# Patient Record
Sex: MALE | Race: WHITE | NOT HISPANIC OR LATINO | URBAN - METROPOLITAN AREA
[De-identification: names, ages, dates, MRNs, and addresses within clinical notes are randomized per-mention and may not be internally consistent; named-entity substitution may affect disease eponyms.]

---

## 2020-07-02 ENCOUNTER — EMERGENCY (EMERGENCY)
Facility: HOSPITAL | Age: 80
LOS: 1 days | Discharge: HOME | End: 2020-07-02
Attending: EMERGENCY MEDICINE | Admitting: EMERGENCY MEDICINE
Payer: MEDICARE

## 2020-07-02 VITALS
DIASTOLIC BLOOD PRESSURE: 114 MMHG | RESPIRATION RATE: 16 BRPM | TEMPERATURE: 98 F | WEIGHT: 134.92 LBS | HEART RATE: 82 BPM | HEIGHT: 72 IN | OXYGEN SATURATION: 98 % | SYSTOLIC BLOOD PRESSURE: 166 MMHG

## 2020-07-02 VITALS
HEART RATE: 78 BPM | TEMPERATURE: 98 F | SYSTOLIC BLOOD PRESSURE: 177 MMHG | DIASTOLIC BLOOD PRESSURE: 72 MMHG | OXYGEN SATURATION: 100 % | RESPIRATION RATE: 20 BRPM

## 2020-07-02 LAB
ALBUMIN SERPL ELPH-MCNC: 3.8 G/DL — SIGNIFICANT CHANGE UP (ref 3.3–5)
ALP SERPL-CCNC: 78 U/L — SIGNIFICANT CHANGE UP (ref 40–120)
ALT FLD-CCNC: 24 U/L — SIGNIFICANT CHANGE UP (ref 12–78)
ANION GAP SERPL CALC-SCNC: 3 MMOL/L — LOW (ref 5–17)
APPEARANCE UR: ABNORMAL
APTT BLD: 33.5 SEC — SIGNIFICANT CHANGE UP (ref 27.5–35.5)
AST SERPL-CCNC: 29 U/L — SIGNIFICANT CHANGE UP (ref 15–37)
BACTERIA # UR AUTO: ABNORMAL
BILIRUB SERPL-MCNC: 0.8 MG/DL — SIGNIFICANT CHANGE UP (ref 0.2–1.2)
BILIRUB UR-MCNC: NEGATIVE — SIGNIFICANT CHANGE UP
BUN SERPL-MCNC: 22 MG/DL — SIGNIFICANT CHANGE UP (ref 7–23)
CALCIUM SERPL-MCNC: 9 MG/DL — SIGNIFICANT CHANGE UP (ref 8.5–10.1)
CHLORIDE SERPL-SCNC: 106 MMOL/L — SIGNIFICANT CHANGE UP (ref 96–108)
CO2 SERPL-SCNC: 30 MMOL/L — SIGNIFICANT CHANGE UP (ref 22–31)
COLOR SPEC: YELLOW — SIGNIFICANT CHANGE UP
CREAT SERPL-MCNC: 1.3 MG/DL — SIGNIFICANT CHANGE UP (ref 0.5–1.3)
DIFF PNL FLD: ABNORMAL
EPI CELLS # UR: SIGNIFICANT CHANGE UP
GLUCOSE SERPL-MCNC: 102 MG/DL — HIGH (ref 70–99)
GLUCOSE UR QL: NEGATIVE — SIGNIFICANT CHANGE UP
HCT VFR BLD CALC: 41 % — SIGNIFICANT CHANGE UP (ref 39–50)
HGB BLD-MCNC: 13.9 G/DL — SIGNIFICANT CHANGE UP (ref 13–17)
INR BLD: 1.12 RATIO — SIGNIFICANT CHANGE UP (ref 0.88–1.16)
KETONES UR-MCNC: NEGATIVE — SIGNIFICANT CHANGE UP
LEUKOCYTE ESTERASE UR-ACNC: ABNORMAL
MCHC RBC-ENTMCNC: 32 PG — SIGNIFICANT CHANGE UP (ref 27–34)
MCHC RBC-ENTMCNC: 33.9 GM/DL — SIGNIFICANT CHANGE UP (ref 32–36)
MCV RBC AUTO: 94.3 FL — SIGNIFICANT CHANGE UP (ref 80–100)
NITRITE UR-MCNC: NEGATIVE — SIGNIFICANT CHANGE UP
NRBC # BLD: 0 /100 WBCS — SIGNIFICANT CHANGE UP (ref 0–0)
PH UR: 6 — SIGNIFICANT CHANGE UP (ref 5–8)
PLATELET # BLD AUTO: 213 K/UL — SIGNIFICANT CHANGE UP (ref 150–400)
POTASSIUM SERPL-MCNC: 4.5 MMOL/L — SIGNIFICANT CHANGE UP (ref 3.5–5.3)
POTASSIUM SERPL-SCNC: 4.5 MMOL/L — SIGNIFICANT CHANGE UP (ref 3.5–5.3)
PROT SERPL-MCNC: 7.7 G/DL — SIGNIFICANT CHANGE UP (ref 6–8.3)
PROT UR-MCNC: 30 MG/DL
PROTHROM AB SERPL-ACNC: 13 SEC — SIGNIFICANT CHANGE UP (ref 10.6–13.6)
RBC # BLD: 4.35 M/UL — SIGNIFICANT CHANGE UP (ref 4.2–5.8)
RBC # FLD: 12.9 % — SIGNIFICANT CHANGE UP (ref 10.3–14.5)
RBC CASTS # UR COMP ASSIST: ABNORMAL /HPF (ref 0–4)
SODIUM SERPL-SCNC: 139 MMOL/L — SIGNIFICANT CHANGE UP (ref 135–145)
SP GR SPEC: 1.01 — SIGNIFICANT CHANGE UP (ref 1.01–1.02)
UROBILINOGEN FLD QL: NEGATIVE — SIGNIFICANT CHANGE UP
WBC # BLD: 7.97 K/UL — SIGNIFICANT CHANGE UP (ref 3.8–10.5)
WBC # FLD AUTO: 7.97 K/UL — SIGNIFICANT CHANGE UP (ref 3.8–10.5)
WBC UR QL: SIGNIFICANT CHANGE UP

## 2020-07-02 PROCEDURE — 99283 EMERGENCY DEPT VISIT LOW MDM: CPT

## 2020-07-02 PROCEDURE — 85610 PROTHROMBIN TIME: CPT

## 2020-07-02 PROCEDURE — 85027 COMPLETE CBC AUTOMATED: CPT

## 2020-07-02 PROCEDURE — 85730 THROMBOPLASTIN TIME PARTIAL: CPT

## 2020-07-02 PROCEDURE — 87086 URINE CULTURE/COLONY COUNT: CPT

## 2020-07-02 PROCEDURE — 80053 COMPREHEN METABOLIC PANEL: CPT

## 2020-07-02 PROCEDURE — 81001 URINALYSIS AUTO W/SCOPE: CPT

## 2020-07-02 PROCEDURE — 51702 INSERT TEMP BLADDER CATH: CPT

## 2020-07-02 PROCEDURE — 36415 COLL VENOUS BLD VENIPUNCTURE: CPT

## 2020-07-02 RX ORDER — CEFUROXIME AXETIL 250 MG
1 TABLET ORAL
Qty: 14 | Refills: 0
Start: 2020-07-02 | End: 2020-07-08

## 2020-07-02 RX ORDER — SODIUM CHLORIDE 9 MG/ML
1000 INJECTION INTRAMUSCULAR; INTRAVENOUS; SUBCUTANEOUS ONCE
Refills: 0 | Status: COMPLETED | OUTPATIENT
Start: 2020-07-02 | End: 2020-07-02

## 2020-07-02 RX ADMIN — SODIUM CHLORIDE 1000 MILLILITER(S): 9 INJECTION INTRAMUSCULAR; INTRAVENOUS; SUBCUTANEOUS at 09:25

## 2020-07-02 NOTE — ED PROVIDER NOTE - CARE PROVIDER_API CALL
Jerome Kay  INTERNAL MEDICINE  [address] [address]  Phone: [phone]  Fax: [phone]  Follow Up Time:     Nicolas Hayes)  Urology  [address] [facility] [address]  [place] [address]  Phone: [phone]  Fax: [phone]  Follow Up Time:

## 2020-07-02 NOTE — ED PROVIDER NOTE - NSFOLLOWUPINSTRUCTIONS_ED_ALL_ED_FT
1) Follow-up with your Primary Medical Doctor. Call today / next business day for prompt follow-up.  2) Return to Emergency room for any worsening or persistent pain, weakness, fever, dizziness, passing out, difficulty breathing or any other concerning symptoms.  3) See attached instruction sheets for additional information, including information regarding signs and symptoms to look out for, reasons to seek immediate care and other important instructions.  4) Follow-up with your Urologist - call today to arrange prompt follow-up this week  5) Ceftin twice daily for 7 days  6) Wear Dexter Catheter until seen by urology 1) Follow-up with your Primary Medical Doctor. Call today / next business day for prompt follow-up.  2) Return to Emergency room for any worsening or persistent pain, weakness, fever, dizziness, passing out, difficulty breathing or any other concerning symptoms.  3) See attached instruction sheets for additional information, including information regarding signs and symptoms to look out for, reasons to seek immediate care and other important instructions.  4) Follow-up with your Urologist - call today to arrange prompt follow-up this week  5) Ceftin twice daily for 7 days  6) Wear Dexter Catheter until seen by urology  7) Have your medical doctor follow your blood pressure as it was elevated in ed today

## 2020-07-02 NOTE — ED PROVIDER NOTE - PATIENT PORTAL LINK FT
You can access the FollowMyHealth Patient Portal offered by [facility] by registering at the following website: [email or web address]. By joining [employer]’s FollowMyHealth portal, you will also be able to view your health information using other applications (apps) compatible with our system.

## 2020-07-02 NOTE — ED PROVIDER NOTE - CARE PROVIDERS DIRECT ADDRESSES
[email or web address]

## 2020-07-02 NOTE — ED ADULT NURSE NOTE - OBJECTIVE STATEMENT
Pt received in bed alert and oriented and resting in bed with the c/o inability to urinate sin Pt received in bed alert and oriented and resting in bed with the c/o inability to urinate since 10pm last.  As per Mds orders thorpe cath placed and it drained 1000ml tea color urine.  Pt tolerated well. Also IV micki placed blood specimen obtained and sent to the lab. Nursing care ongoing and safety maintained

## 2020-07-02 NOTE — ED ADULT NURSE NOTE - NSIMPLEMENTINTERV_GEN_ALL_ED
Implemented All Universal Safety Interventions:  [place] to call system. Call bell, personal items and telephone within reach. Instruct patient to call for assistance. Room bathroom lighting operational. Non-slip footwear when patient is off stretcher. Physically safe environment: no spills, clutter or unnecessary equipment. Stretcher in lowest position, wheels locked, appropriate side rails in place.

## 2020-07-02 NOTE — ED PROVIDER NOTE - MDM ORDERS SUBMITTED SELECTION
No distress    Vital Signs Last 24 Hrs  T(C): 36.7 (12-05-19 @ 07:21), Max: 37.1 (12-04-19 @ 20:31)  T(F): 98.1 (12-05-19 @ 07:21), Max: 98.8 (12-04-19 @ 20:31)  HR: 77 (12-05-19 @ 07:21) (77 - 80)  BP: 138/89 (12-05-19 @ 07:21) (116/76 - 138/89)  RR: 14 (12-05-19 @ 07:21) (14 - 14)  SpO2: 96% (12-05-19 @ 07:21) (96% - 96%)    Card S1S2  Lungs b/l air entry  Abd soft, NT, ND  Extr no edema                                              17.3   7.66  )-----------( 255      ( 04 Dec 2019 09:28 )             50.3     04 Dec 2019 09:28    137    |  101    |  24     ----------------------------<  214    4.3     |  26     |  1.43     Ca    9.1        04 Dec 2019 09:28    TPro  8.1    /  Alb  3.1    /  TBili  0.8    /  DBili  x      /  AST  19     /  ALT  34     /  AlkPhos  88     04 Dec 2019 09:28    LIVER FUNCTIONS - ( 04 Dec 2019 09:28 )  Alb: 3.1 g/dL / Pro: 8.1 g/dL / ALK PHOS: 88 U/L / ALT: 34 U/L / AST: 19 U/L / GGT: x           amLODIPine   Tablet 10 milliGRAM(s) Oral at bedtime  aspirin  chewable 81 milliGRAM(s) Oral daily  atorvastatin 80 milliGRAM(s) Oral at bedtime  dextrose 40% Gel 15 Gram(s) Oral once PRN  dextrose 5%. 1000 milliLiter(s) IV Continuous <Continuous>  dextrose 50% Injectable 12.5 Gram(s) IV Push once  dextrose 50% Injectable 25 Gram(s) IV Push once  dextrose 50% Injectable 25 Gram(s) IV Push once  enoxaparin Injectable 40 milliGRAM(s) SubCutaneous daily  glucagon  Injectable 1 milliGRAM(s) IntraMuscular once PRN  insulin glargine Injectable (LANTUS) 26 Unit(s) SubCutaneous at bedtime  insulin lispro (HumaLOG) corrective regimen sliding scale   SubCutaneous three times a day before meals  insulin lispro (HumaLOG) corrective regimen sliding scale   SubCutaneous at bedtime  ondansetron    Tablet 4 milliGRAM(s) Oral two times a day PRN  pantoprazole    Tablet 40 milliGRAM(s) Oral before breakfast    A/P:    Pontine CVA, dizziness resolving  HTN  BP controlled on Norvasc 10mg QHS   Increased Cr noted  D/w pt to increase fluid intake  BMP in am Labs

## 2020-07-02 NOTE — ED ADULT NURSE NOTE - PSH
BPH (Benign Prostatic Hyperplasia)  s/p biopsy 2010 - benign  Nephrolithiasis  s/p litho 2004  S/P herniorrhaphy  with mesh  S/P tonsillectomy

## 2020-07-03 LAB
CULTURE RESULTS: NO GROWTH — SIGNIFICANT CHANGE UP
SPECIMEN SOURCE: SIGNIFICANT CHANGE UP

## 2020-07-09 ENCOUNTER — EMERGENCY (EMERGENCY)
Facility: HOSPITAL | Age: 80
LOS: 1 days | Discharge: HOME | End: 2020-07-09
Attending: EMERGENCY MEDICINE | Admitting: EMERGENCY MEDICINE
Payer: MEDICARE

## 2020-07-09 ENCOUNTER — TRANSCRIPTION ENCOUNTER (OUTPATIENT)
Age: 80
End: 2020-07-09

## 2020-07-09 VITALS
RESPIRATION RATE: 16 BRPM | TEMPERATURE: 98 F | HEART RATE: 62 BPM | DIASTOLIC BLOOD PRESSURE: 85 MMHG | OXYGEN SATURATION: 99 % | SYSTOLIC BLOOD PRESSURE: 144 MMHG

## 2020-07-09 VITALS
HEART RATE: 79 BPM | DIASTOLIC BLOOD PRESSURE: 100 MMHG | SYSTOLIC BLOOD PRESSURE: 165 MMHG | HEIGHT: 72 IN | OXYGEN SATURATION: 99 % | WEIGHT: 134.92 LBS | RESPIRATION RATE: 16 BRPM | TEMPERATURE: 98 F

## 2020-07-09 PROCEDURE — 51702 INSERT TEMP BLADDER CATH: CPT

## 2020-07-09 PROCEDURE — 99283 EMERGENCY DEPT VISIT LOW MDM: CPT | Mod: 25

## 2020-07-09 PROCEDURE — 99285 EMERGENCY DEPT VISIT HI MDM: CPT

## 2020-07-09 RX ORDER — TAMSULOSIN HYDROCHLORIDE 0.4 MG/1
2 CAPSULE ORAL
Qty: 0 | Refills: 0 | DISCHARGE

## 2020-07-09 RX ORDER — TAMSULOSIN HYDROCHLORIDE 0.4 MG/1
1 CAPSULE ORAL
Qty: 0 | Refills: 0 | DISCHARGE

## 2020-07-09 NOTE — ED PROVIDER NOTE - PROGRESS NOTE DETAILS
pt reevaluted, feeling better, pt put out about 800cc urine, clear, no hematuria, pt to be d/c home follow up with dr nicole, finish abx as prescribed, return if any sytmpoms worsen

## 2020-07-09 NOTE — ED PROVIDER NOTE - CARE PROVIDER_API CALL
Nicolas Hayes)  Urology  [address] [facility] [address]  [place] [address]  Phone: [phone]  Fax: [phone]  Follow Up Time:

## 2020-07-09 NOTE — ED ADULT NURSE NOTE - OBJECTIVE STATEMENT
Patient came in to ED c/o urinary retention since last night. Patient states had thorpe catheter for 1 week and was taken off by his Urologist yesterday.

## 2020-07-09 NOTE — ED ADULT NURSE NOTE - NSIMPLEMENTINTERV_GEN_ALL_ED
Implemented All Universal Safety Interventions:  [place] to call system. Call bell, personal items and telephone within reach. Instruct patient to call for assistance. Room bathroom lighting operational. Non-slip footwear when patient is off stretcher. Physically safe environment: no spills, clutter or unnecessary equipment. Stretcher in lowest position, wheels locked, appropriate side rails in place.

## 2020-07-09 NOTE — ED PROVIDER NOTE - OBJECTIVE STATEMENT
81yo male who presents with urinary retention tonite. pt has hx of bph and was in the ER for retention a week ago, had the thorpe removed yesterday and was fine until the night, he started to have pressure and has not urinated since 10pm last nite, no fever, chills no back pain or vomiting, pt has been on abx and is due back to see his urologist next week

## 2020-07-09 NOTE — ED ADULT NURSE REASSESSMENT NOTE - NS ED NURSE REASSESS COMMENT FT1
Indwelling thorpe catheter F16 inserted and drained an initial output of 800ml. Patient tolerated procedure well.

## 2020-07-09 NOTE — ED PROVIDER NOTE - PATIENT PORTAL LINK FT
You can access the FollowMyHealth Patient Portal offered by [facility] by registering at the following website: [email or web address]. By joining [employer]’s FollowMyHealth portal, you will also be able to view your health information using other applications (apps) compatible with our system.

## 2020-08-20 ENCOUNTER — OUTPATIENT (OUTPATIENT)
Dept: OUTPATIENT SERVICES | Facility: HOSPITAL | Age: 80
LOS: 1 days | End: 2020-08-20
Payer: MEDICARE

## 2020-08-20 VITALS
SYSTOLIC BLOOD PRESSURE: 152 MMHG | RESPIRATION RATE: 15 BRPM | TEMPERATURE: 98 F | OXYGEN SATURATION: 100 % | HEIGHT: 72 IN | HEART RATE: 71 BPM | WEIGHT: 126.99 LBS | DIASTOLIC BLOOD PRESSURE: 102 MMHG

## 2020-08-20 DIAGNOSIS — R33.8 OTHER RETENTION OF URINE: ICD-10-CM

## 2020-08-20 DIAGNOSIS — Z01.818 ENCOUNTER FOR OTHER PREPROCEDURAL EXAMINATION: ICD-10-CM

## 2020-08-20 DIAGNOSIS — N40.1 BENIGN PROSTATIC HYPERPLASIA WITH LOWER URINARY TRACT SYMPTOMS: ICD-10-CM

## 2020-08-20 LAB
ALBUMIN SERPL ELPH-MCNC: 3.7 G/DL — SIGNIFICANT CHANGE UP (ref 3.3–5)
ALP SERPL-CCNC: 92 U/L — SIGNIFICANT CHANGE UP (ref 40–120)
ALT FLD-CCNC: 22 U/L — SIGNIFICANT CHANGE UP (ref 12–78)
ANION GAP SERPL CALC-SCNC: 2 MMOL/L — LOW (ref 5–17)
APPEARANCE UR: ABNORMAL
AST SERPL-CCNC: 25 U/L — SIGNIFICANT CHANGE UP (ref 15–37)
BACTERIA # UR AUTO: ABNORMAL
BILIRUB SERPL-MCNC: 0.8 MG/DL — SIGNIFICANT CHANGE UP (ref 0.2–1.2)
BILIRUB UR-MCNC: NEGATIVE — SIGNIFICANT CHANGE UP
BUN SERPL-MCNC: 21 MG/DL — SIGNIFICANT CHANGE UP (ref 7–23)
CALCIUM SERPL-MCNC: 9.1 MG/DL — SIGNIFICANT CHANGE UP (ref 8.5–10.1)
CHLORIDE SERPL-SCNC: 104 MMOL/L — SIGNIFICANT CHANGE UP (ref 96–108)
CO2 SERPL-SCNC: 35 MMOL/L — HIGH (ref 22–31)
COLOR SPEC: YELLOW — SIGNIFICANT CHANGE UP
CREAT SERPL-MCNC: 1.3 MG/DL — SIGNIFICANT CHANGE UP (ref 0.5–1.3)
DIFF PNL FLD: ABNORMAL
EPI CELLS # UR: SIGNIFICANT CHANGE UP
GLUCOSE SERPL-MCNC: 73 MG/DL — SIGNIFICANT CHANGE UP (ref 70–99)
GLUCOSE UR QL: NEGATIVE — SIGNIFICANT CHANGE UP
HCT VFR BLD CALC: 40.9 % — SIGNIFICANT CHANGE UP (ref 39–50)
HGB BLD-MCNC: 13.4 G/DL — SIGNIFICANT CHANGE UP (ref 13–17)
KETONES UR-MCNC: NEGATIVE — SIGNIFICANT CHANGE UP
LEUKOCYTE ESTERASE UR-ACNC: ABNORMAL
MCHC RBC-ENTMCNC: 31.1 PG — SIGNIFICANT CHANGE UP (ref 27–34)
MCHC RBC-ENTMCNC: 32.8 GM/DL — SIGNIFICANT CHANGE UP (ref 32–36)
MCV RBC AUTO: 94.9 FL — SIGNIFICANT CHANGE UP (ref 80–100)
NITRITE UR-MCNC: NEGATIVE — SIGNIFICANT CHANGE UP
NRBC # BLD: 0 /100 WBCS — SIGNIFICANT CHANGE UP (ref 0–0)
PH UR: 5 — SIGNIFICANT CHANGE UP (ref 5–8)
PLATELET # BLD AUTO: 324 K/UL — SIGNIFICANT CHANGE UP (ref 150–400)
POTASSIUM SERPL-MCNC: 4.5 MMOL/L — SIGNIFICANT CHANGE UP (ref 3.5–5.3)
POTASSIUM SERPL-SCNC: 4.5 MMOL/L — SIGNIFICANT CHANGE UP (ref 3.5–5.3)
PROT SERPL-MCNC: 8 G/DL — SIGNIFICANT CHANGE UP (ref 6–8.3)
PROT UR-MCNC: 100
RBC # BLD: 4.31 M/UL — SIGNIFICANT CHANGE UP (ref 4.2–5.8)
RBC # FLD: 12.6 % — SIGNIFICANT CHANGE UP (ref 10.3–14.5)
RBC CASTS # UR COMP ASSIST: ABNORMAL /HPF (ref 0–4)
SODIUM SERPL-SCNC: 141 MMOL/L — SIGNIFICANT CHANGE UP (ref 135–145)
SP GR SPEC: 1.02 — SIGNIFICANT CHANGE UP (ref 1.01–1.02)
UROBILINOGEN FLD QL: NEGATIVE — SIGNIFICANT CHANGE UP
WBC # BLD: 7.11 K/UL — SIGNIFICANT CHANGE UP (ref 3.8–10.5)
WBC # FLD AUTO: 7.11 K/UL — SIGNIFICANT CHANGE UP (ref 3.8–10.5)
WBC UR QL: >50

## 2020-08-20 PROCEDURE — 80053 COMPREHEN METABOLIC PANEL: CPT

## 2020-08-20 PROCEDURE — 87186 SC STD MICRODIL/AGAR DIL: CPT

## 2020-08-20 PROCEDURE — G0463: CPT

## 2020-08-20 PROCEDURE — 85027 COMPLETE CBC AUTOMATED: CPT

## 2020-08-20 PROCEDURE — 81001 URINALYSIS AUTO W/SCOPE: CPT

## 2020-08-20 PROCEDURE — 36415 COLL VENOUS BLD VENIPUNCTURE: CPT

## 2020-08-20 PROCEDURE — 93010 ELECTROCARDIOGRAM REPORT: CPT

## 2020-08-20 PROCEDURE — 93005 ELECTROCARDIOGRAM TRACING: CPT

## 2020-08-20 PROCEDURE — 87086 URINE CULTURE/COLONY COUNT: CPT

## 2020-08-20 NOTE — H&P PST ADULT - NSICDXPROBLEM_GEN_ALL_CORE_FT
PROBLEM DIAGNOSES  Problem: Enlarged prostate with lower urinary tract symptoms (LUTS)  Assessment and Plan: for Green light laser prostate

## 2020-08-20 NOTE — H&P PST ADULT - ASSESSMENT
81 yo M w LUTS for Green light prostate   cysto litholapaxy     Medical clearance pending  w labs      Advised gain weight     COVID swab pending

## 2020-08-20 NOTE — H&P PST ADULT - GENERAL MEDS COMMENT, PROFILE
Spoke with patient and informed of fungi nail OTC and care of feet with keeping feet dry and applying fungi nail and wear white socks.   to d/c all supplements

## 2020-08-20 NOTE — H&P PST ADULT - NSICDXPASTMEDICALHX_GEN_ALL_CORE_FT
PAST MEDICAL HISTORY:  Bladder calculi     BPH (Benign Prostatic Hyperplasia)     Enlarged prostate without lower urinary tract symptoms (luts)     HLD (hyperlipidemia)     HTN (hypertension)     HTN (hypertension) no meds at present    Nephrolithiasis     Weight loss

## 2020-08-20 NOTE — H&P PST ADULT - HISTORY OF PRESENT ILLNESS
79 yo M for Green light laser prostate  / cysto litholapaxy  bladder  Pt reports LUTS   Seen in ED w acute urinary retention July 2 2020

## 2020-08-20 NOTE — H&P PST ADULT - NSANTHOSAYNRD_GEN_A_CORE
No. JAIR screening performed.  STOP BANG Legend: 0-2 = LOW Risk; 3-4 = INTERMEDIATE Risk; 5-8 = HIGH Risk

## 2020-08-20 NOTE — H&P PST ADULT - NSICDXPASTSURGICALHX_GEN_ALL_CORE_FT
PAST SURGICAL HISTORY:  BPH (Benign Prostatic Hyperplasia) s/p biopsy 2010 - benign    Nephrolithiasis s/p litho 2004    S/P herniorrhaphy with mesh    S/P tonsillectomy

## 2020-08-20 NOTE — H&P PST ADULT - RS GEN PE MLT RESP DETAILS PC
Please notify pt that result notes have been sent to pt via [employer] portal. If she does not have access, please review results with patient. breath sounds equal/clear to auscultation bilaterally

## 2020-08-26 PROBLEM — N40.0 BENIGN PROSTATIC HYPERPLASIA WITHOUT LOWER URINARY TRACT SYMPTOMS: Chronic | Status: ACTIVE | Noted: 2020-08-20

## 2020-08-26 PROBLEM — I10 ESSENTIAL (PRIMARY) HYPERTENSION: Chronic | Status: ACTIVE | Noted: 2020-08-20

## 2020-08-26 PROBLEM — N21.0 CALCULUS IN BLADDER: Chronic | Status: ACTIVE | Noted: 2020-08-20

## 2020-08-26 PROBLEM — R63.4 ABNORMAL WEIGHT LOSS: Chronic | Status: ACTIVE | Noted: 2020-08-20

## 2020-08-28 ENCOUNTER — OUTPATIENT (OUTPATIENT)
Dept: OUTPATIENT SERVICES | Facility: HOSPITAL | Age: 80
LOS: 1 days | End: 2020-08-28
Payer: MEDICARE

## 2020-08-28 DIAGNOSIS — Z11.59 ENCOUNTER FOR SCREENING FOR OTHER VIRAL DISEASES: ICD-10-CM

## 2020-08-28 LAB — SARS-COV-2 RNA SPEC QL NAA+PROBE: SIGNIFICANT CHANGE UP

## 2020-08-28 PROCEDURE — U0003: CPT

## 2020-08-28 NOTE — ASU PATIENT PROFILE, ADULT - SURGERY TIME
Patient reassessed. Appears well. To be transferred to [place] [id number]. On 2L NC.      Electronically signed by Jessica Muhammad RN on 3/16/2020 at 4:01 PM 07:30

## 2020-08-28 NOTE — ASU PATIENT PROFILE, ADULT - MEDICATION HERBAL REMEDIES, PROFILE
Internal Medicine Progress Note  Date: 1/5/2017     Chief complaint: Dyspnea      Subjective:     Exertional dyspnea improving, chest tightness improving.  However symptoms not completely resolved.  Complaints of feeling weak and fatigued.  Denies any chest pain, feelings of palpitations. Continues to have a nonproductive cough.     Objective:  Vitals:    01/04/17 2227 01/05/17 0655 01/05/17 0751 01/05/17 1051   BP: 147/54  148/60    Pulse: 60  58    Resp: 14  20    Temp: 96.9 °F (36.1 °C)  97 °F (36.1 °C)    TempSrc: Temporal Artery  Temporal Artery    SpO2: 93% 94% 94% 92%   Weight:       Height:           Physical Examination  Constiutional: No acute distress, appears stated age.  HEENT: Normocephalic/atraumatic,  no nystagmus, anicteric, no nasal discharge, mucous membranes moist, normal appearing oropharyngeal mucosa without lesions, erythema or exudate.   Cardiovascular: Regular rate and rhythm, normal S1 and S2, No peripheral edema.  Respiratory: normal respiratory effort and normal rate at rest,  no accessory muscle use, clear to auscultation bilaterally   Gastrointestinal: soft, non-tender, non-distended, no guarding or rebound, bowel sounds present and normal.   Musculoskeletal: No clubbing or cyanosis.  Normal muscle mass and tone.  Skin: Warm and dry, no diaphoresis. No rashes, jaundice or other lesions appreciated.      Labs:    Recent Labs  Lab 01/05/17  0605   SODIUM 138   POTASSIUM 4.5   CHLORIDE 105   CO2 22   BUN 40*   CREATININE 1.79*   GLUCOSE 137*       Recent Labs  Lab 01/05/17  0605   WBC 6.9   HGB 9.3*   HCT 28.4*   *   MCV 85.5          Assessment and Plan:      1) Pneumonia-continue pneumonia protocol with IV antibiotics and nebulized breathing treatments.  Elevated d-dimer Dopplers and CT angiogram of chest pending.    2) Nonsustained atrial fibrillation and tachycardia- improved on sotalol.  Appreciate cardiology consultation.  Await echocardiogram results    3)  Hyperlipidemia-continue lovastatin      Code Status: Full Resuscitation      Vale Escalera NP  1/5/2017  11:07 AM     Seen and examined the patient. Agree with above assessment and plan. Patient here with pneumonia. Heart S1-S2 heard. Lungs with bilateral diffuse rhonchi. Continue antibiotics, monitor on floor today   yes

## 2020-08-30 ENCOUNTER — TRANSCRIPTION ENCOUNTER (OUTPATIENT)
Age: 80
End: 2020-08-30

## 2020-08-31 ENCOUNTER — RESULT REVIEW (OUTPATIENT)
Age: 80
End: 2020-08-31

## 2020-08-31 ENCOUNTER — OUTPATIENT (OUTPATIENT)
Dept: OUTPATIENT SERVICES | Facility: HOSPITAL | Age: 80
LOS: 1 days | End: 2020-08-31
Payer: MEDICARE

## 2020-08-31 VITALS
RESPIRATION RATE: 15 BRPM | WEIGHT: 126.99 LBS | OXYGEN SATURATION: 100 % | TEMPERATURE: 98 F | DIASTOLIC BLOOD PRESSURE: 102 MMHG | SYSTOLIC BLOOD PRESSURE: 152 MMHG | HEART RATE: 71 BPM | HEIGHT: 72 IN

## 2020-08-31 VITALS
SYSTOLIC BLOOD PRESSURE: 122 MMHG | DIASTOLIC BLOOD PRESSURE: 77 MMHG | TEMPERATURE: 98 F | OXYGEN SATURATION: 98 % | RESPIRATION RATE: 12 BRPM | HEART RATE: 59 BPM

## 2020-08-31 DIAGNOSIS — R33.8 OTHER RETENTION OF URINE: ICD-10-CM

## 2020-08-31 DIAGNOSIS — D49.4 NEOPLASM OF UNSPECIFIED BEHAVIOR OF BLADDER: ICD-10-CM

## 2020-08-31 PROCEDURE — 82365 CALCULUS SPECTROSCOPY: CPT

## 2020-08-31 PROCEDURE — 52648 LASER SURGERY OF PROSTATE: CPT

## 2020-08-31 PROCEDURE — 88300 SURGICAL PATH GROSS: CPT

## 2020-08-31 PROCEDURE — 52317 REMOVE BLADDER STONE: CPT

## 2020-08-31 PROCEDURE — C1769: CPT

## 2020-08-31 PROCEDURE — 88300 SURGICAL PATH GROSS: CPT | Mod: 26,59

## 2020-08-31 PROCEDURE — 88305 TISSUE EXAM BY PATHOLOGIST: CPT | Mod: 26,59

## 2020-08-31 PROCEDURE — C1889: CPT

## 2020-08-31 PROCEDURE — 88307 TISSUE EXAM BY PATHOLOGIST: CPT

## 2020-08-31 PROCEDURE — 52234 CYSTOSCOPY AND TREATMENT: CPT | Mod: XS

## 2020-08-31 PROCEDURE — 88307 TISSUE EXAM BY PATHOLOGIST: CPT | Mod: 26,59

## 2020-08-31 PROCEDURE — 88305 TISSUE EXAM BY PATHOLOGIST: CPT

## 2020-08-31 RX ORDER — ONDANSETRON 8 MG/1
4 TABLET, FILM COATED ORAL ONCE
Refills: 0 | Status: DISCONTINUED | OUTPATIENT
Start: 2020-08-31 | End: 2020-08-31

## 2020-08-31 RX ORDER — SODIUM CHLORIDE 9 MG/ML
1000 INJECTION INTRAMUSCULAR; INTRAVENOUS; SUBCUTANEOUS
Refills: 0 | Status: DISCONTINUED | OUTPATIENT
Start: 2020-08-31 | End: 2020-08-31

## 2020-08-31 RX ORDER — CEFTRIAXONE 500 MG/1
1000 INJECTION, POWDER, FOR SOLUTION INTRAMUSCULAR; INTRAVENOUS ONCE
Refills: 0 | Status: COMPLETED | OUTPATIENT
Start: 2020-08-31 | End: 2020-08-31

## 2020-08-31 RX ORDER — HYDROMORPHONE HYDROCHLORIDE 2 MG/ML
0.5 INJECTION INTRAMUSCULAR; INTRAVENOUS; SUBCUTANEOUS
Refills: 0 | Status: DISCONTINUED | OUTPATIENT
Start: 2020-08-31 | End: 2020-08-31

## 2020-08-31 RX ORDER — ACETAMINOPHEN 500 MG
1000 TABLET ORAL ONCE
Refills: 0 | Status: COMPLETED | OUTPATIENT
Start: 2020-08-31 | End: 2020-08-31

## 2020-08-31 RX ORDER — AZITHROMYCIN 500 MG/1
250 TABLET, FILM COATED ORAL
Qty: 0 | Refills: 0 | DISCHARGE

## 2020-08-31 RX ORDER — SODIUM CHLORIDE 9 MG/ML
1000 INJECTION, SOLUTION INTRAVENOUS
Refills: 0 | Status: DISCONTINUED | OUTPATIENT
Start: 2020-08-31 | End: 2020-08-31

## 2020-08-31 RX ADMIN — Medication 400 MILLIGRAM(S): at 11:07

## 2020-08-31 RX ADMIN — SODIUM CHLORIDE 75 MILLILITER(S): 9 INJECTION, SOLUTION INTRAVENOUS at 11:06

## 2020-08-31 RX ADMIN — SODIUM CHLORIDE 50 MILLILITER(S): 9 INJECTION INTRAMUSCULAR; INTRAVENOUS; SUBCUTANEOUS at 07:00

## 2020-08-31 NOTE — BRIEF OPERATIVE NOTE - NSICDXBRIEFPOSTOP_GEN_ALL_CORE_FT
POST-OP DIAGNOSIS:  BPH with urinary obstruction 31-Aug-2020 09:53:30  Rikki Pinon  Bladder stones 31-Aug-2020 09:53:14  Rikki Pinon

## 2020-08-31 NOTE — BRIEF OPERATIVE NOTE - NSICDXBRIEFPROCEDURE_GEN_ALL_CORE_FT
PROCEDURES:  Excision of bladder tumor 31-Aug-2020 09:52:32  Rikki Pinon  Cystoscopic laser lithotripsy of bladder calculus 31-Aug-2020 09:49:42  Rikki Pinon  Photoselective vaporization of prostate (PVP) with GreenLight laser 31-Aug-2020 09:48:41  Rikki Pinon

## 2020-08-31 NOTE — BRIEF OPERATIVE NOTE - NSICDXBRIEFPREOP_GEN_ALL_CORE_FT
PRE-OP DIAGNOSIS:  BPH with urinary obstruction 31-Aug-2020 09:53:04  Rikki Pinon  Bladder stone 31-Aug-2020 09:52:51  Rikki Pinon

## 2020-08-31 NOTE — ASU DISCHARGE PLAN (ADULT/PEDIATRIC) - CARE PROVIDER_API CALL
Rikki Pinon  UROLOGY  [address] [facility], [address]  [place] [address]  Phone: [phone]  Fax: [phone]  Follow Up Time:

## 2020-08-31 NOTE — ASU DISCHARGE PLAN (ADULT/PEDIATRIC) - NURSING INSTRUCTIONS
See Dr. Pinon on Thursday 9/3 @ 8:45AM at his office to remove catheter. No need to call for appointment.

## 2020-08-31 NOTE — ASU DISCHARGE PLAN (ADULT/PEDIATRIC) - NO EXERCISE DURATION
Patient overdue for laboratory assessment.     Lab orders extended 1 week.     Letter sent.    4 weeks

## 2020-09-01 LAB — SURGICAL PATHOLOGY STUDY: SIGNIFICANT CHANGE UP

## 2020-09-08 LAB — NIDUS STONE QN: SIGNIFICANT CHANGE UP

## 2021-05-04 NOTE — ED ADULT NURSE NOTE - BREATHING, MLM
Patient was phoned by me with result.  These results were previously discussed on the day of release.   Spontaneous, unlabored and symmetrical

## 2022-03-28 NOTE — ED PROVIDER NOTE - OBJECTIVE STATEMENT
Statement Selected 81 yo M p/w dysuria since last night, unable to pass urine. No fever/chills. Pt co lower abd fullness, otherwise with no acute pain. no fever/chills. no weakness / dizziness. no neck / back pain. no numb/ting/focal weak. no recent trauma. Pt with hx similar few years ago. No other inj or co. no recent COVID exposures. No other acute co.

## 2022-04-26 ENCOUNTER — NON-APPOINTMENT (OUTPATIENT)
Age: 82
End: 2022-04-26

## 2022-04-29 ENCOUNTER — NON-APPOINTMENT (OUTPATIENT)
Age: 82
End: 2022-04-29

## 2022-05-03 ENCOUNTER — NON-APPOINTMENT (OUTPATIENT)
Age: 82
End: 2022-05-03

## 2022-05-03 ENCOUNTER — APPOINTMENT (OUTPATIENT)
Dept: SURGERY | Facility: CLINIC | Age: 82
End: 2022-05-03
Payer: MEDICARE

## 2022-05-03 VITALS
HEART RATE: 59 BPM | WEIGHT: 135 LBS | TEMPERATURE: 97.6 F | BODY MASS INDEX: 18.28 KG/M2 | OXYGEN SATURATION: 99 % | HEIGHT: 72 IN | SYSTOLIC BLOOD PRESSURE: 125 MMHG | DIASTOLIC BLOOD PRESSURE: 86 MMHG

## 2022-05-03 DIAGNOSIS — Z78.9 OTHER SPECIFIED HEALTH STATUS: ICD-10-CM

## 2022-05-03 DIAGNOSIS — Z86.79 PERSONAL HISTORY OF OTHER DISEASES OF THE CIRCULATORY SYSTEM: ICD-10-CM

## 2022-05-03 DIAGNOSIS — Z80.0 FAMILY HISTORY OF MALIGNANT NEOPLASM OF DIGESTIVE ORGANS: ICD-10-CM

## 2022-05-03 DIAGNOSIS — Z82.49 FAMILY HISTORY OF ISCHEMIC HEART DISEASE AND OTHER DISEASES OF THE CIRCULATORY SYSTEM: ICD-10-CM

## 2022-05-03 PROCEDURE — 99204 OFFICE O/P NEW MOD 45 MIN: CPT

## 2022-05-03 NOTE — ASSESSMENT
[FreeTextEntry1] : I have discussed the signs and symptoms of incarceration and strangulation with pt and the importance of calling immediately should they develop.\par I have also discussed the risks, benefits and options and pt would like to treat his hernia conservatively. Pt is going to try a truss.

## 2022-05-03 NOTE — PHYSICAL EXAM
[JVD] : no jugular venous distention  [Normal Thyroid] : the thyroid was normal [Carotid Bruits] : no carotid bruits [Normal Breath Sounds] : Normal breath sounds [Normal Heart Sounds] : normal heart sounds [Normal Rate and Rhythm] : normal rate and rhythm [No Rash or Lesion] : No rash or lesion [Alert] : alert [Oriented to Person] : oriented to person [Oriented to Place] : oriented to place [Oriented to Time] : oriented to time [Calm] : calm [de-identified] : well developed male in no distress [de-identified] : noninjected and nonicteric [de-identified] : without adenopathy [de-identified] : normal bowel sounds, without distension or tenderness [de-identified] : normal testicles, with left inguinal hernia, easily reducible. Right side normal  [de-identified] : done by urologist [de-identified] : without calf pain or swelling

## 2022-05-03 NOTE — HISTORY OF PRESENT ILLNESS
[de-identified] : left inguinal hernia [de-identified] : 82 year old white male who presents with a ten year history of a left inguinal hernia. Pt states it has gradually increased in size and is now causing him pain. He denies any nausea or vomiting or changes in his bowel habits. Pt had prostate laser treatments.

## 2022-06-02 ENCOUNTER — APPOINTMENT (OUTPATIENT)
Dept: SURGERY | Facility: CLINIC | Age: 82
End: 2022-06-02
Payer: MEDICARE

## 2022-06-02 VITALS — TEMPERATURE: 97.7 F

## 2022-06-02 PROCEDURE — 99212 OFFICE O/P EST SF 10 MIN: CPT

## 2022-06-02 NOTE — ASSESSMENT
[FreeTextEntry1] : I have again discussed the signs and symptoms of incarceration and strangulation and the importance of calling immediately should they develop.

## 2022-06-02 NOTE — PHYSICAL EXAM
[Normal Breath Sounds] : Normal breath sounds [Normal Heart Sounds] : normal heart sounds [Calm] : calm [de-identified] : well developed male in no distress [de-identified] : normal bowel sounds, without distension or tenderness [de-identified] : hernia is soft and easily reducible.

## 2022-06-02 NOTE — HISTORY OF PRESENT ILLNESS
[de-identified] : left inguinal hernia [de-identified] : Pt being treated conservatively for his left inguinal hernia. Pt denies any pain. Comes in with his trus. No nausea or vomiting or change in his bowel habits.

## 2022-06-23 ENCOUNTER — APPOINTMENT (OUTPATIENT)
Dept: SURGERY | Facility: CLINIC | Age: 82
End: 2022-06-23
Payer: MEDICARE

## 2022-06-23 DIAGNOSIS — K40.90 UNILATERAL INGUINAL HERNIA, W/OUT OBSTRUCTION OR GANGRENE, NOT SPECIFIED AS RECURRENT: ICD-10-CM

## 2022-06-23 PROCEDURE — 99214 OFFICE O/P EST MOD 30 MIN: CPT

## 2022-06-23 NOTE — HISTORY OF PRESENT ILLNESS
[de-identified] : left inguinal hernia [de-identified] : pt was being followed for an asymptomatic left inguinal hernia, but pt presents c/o episodes of left groin pain, He denies any nausea or vomiting or changes in his bowel habits.

## 2022-06-23 NOTE — ASSESSMENT
[FreeTextEntry1] : I have discussed all of the risks, benefits and options with pt. Pt wants to schedule an open repair of is left inguinal hernia with  a pain pump.

## 2022-06-23 NOTE — PHYSICAL EXAM
[Normal Breath Sounds] : Normal breath sounds [Normal Heart Sounds] : normal heart sounds [Normal Rate and Rhythm] : normal rate and rhythm [Calm] : calm [de-identified] : well developed white male in no distress [de-identified] : normal bowel sounds, without distension or tenderness [de-identified] : normal testicles, with a left inguinal hernia, right side normal. [de-identified] : without calf pain or swelling

## 2022-06-29 ENCOUNTER — OUTPATIENT (OUTPATIENT)
Dept: OUTPATIENT SERVICES | Facility: HOSPITAL | Age: 82
LOS: 1 days | End: 2022-06-29
Payer: MEDICARE

## 2022-06-29 VITALS
DIASTOLIC BLOOD PRESSURE: 87 MMHG | OXYGEN SATURATION: 100 % | HEART RATE: 65 BPM | SYSTOLIC BLOOD PRESSURE: 120 MMHG | HEIGHT: 72 IN | RESPIRATION RATE: 16 BRPM | WEIGHT: 128.09 LBS | TEMPERATURE: 97 F

## 2022-06-29 DIAGNOSIS — Z01.818 ENCOUNTER FOR OTHER PREPROCEDURAL EXAMINATION: ICD-10-CM

## 2022-06-29 DIAGNOSIS — Z98.890 OTHER SPECIFIED POSTPROCEDURAL STATES: Chronic | ICD-10-CM

## 2022-06-29 DIAGNOSIS — K40.90 UNILATERAL INGUINAL HERNIA, WITHOUT OBSTRUCTION OR GANGRENE, NOT SPECIFIED AS RECURRENT: ICD-10-CM

## 2022-06-29 LAB
ANION GAP SERPL CALC-SCNC: 4 MMOL/L — LOW (ref 5–17)
APPEARANCE UR: CLEAR — SIGNIFICANT CHANGE UP
BILIRUB UR-MCNC: NEGATIVE — SIGNIFICANT CHANGE UP
BUN SERPL-MCNC: 21 MG/DL — SIGNIFICANT CHANGE UP (ref 7–23)
CALCIUM SERPL-MCNC: 9.2 MG/DL — SIGNIFICANT CHANGE UP (ref 8.5–10.1)
CHLORIDE SERPL-SCNC: 105 MMOL/L — SIGNIFICANT CHANGE UP (ref 96–108)
CO2 SERPL-SCNC: 30 MMOL/L — SIGNIFICANT CHANGE UP (ref 22–31)
COLOR SPEC: YELLOW — SIGNIFICANT CHANGE UP
CREAT SERPL-MCNC: 1.3 MG/DL — SIGNIFICANT CHANGE UP (ref 0.5–1.3)
DIFF PNL FLD: ABNORMAL
EGFR: 55 ML/MIN/1.73M2 — LOW
GLUCOSE SERPL-MCNC: 85 MG/DL — SIGNIFICANT CHANGE UP (ref 70–99)
GLUCOSE UR QL: NEGATIVE — SIGNIFICANT CHANGE UP
HCT VFR BLD CALC: 42 % — SIGNIFICANT CHANGE UP (ref 39–50)
HGB BLD-MCNC: 13.7 G/DL — SIGNIFICANT CHANGE UP (ref 13–17)
KETONES UR-MCNC: NEGATIVE — SIGNIFICANT CHANGE UP
LEUKOCYTE ESTERASE UR-ACNC: ABNORMAL
MCHC RBC-ENTMCNC: 31.5 PG — SIGNIFICANT CHANGE UP (ref 27–34)
MCHC RBC-ENTMCNC: 32.6 GM/DL — SIGNIFICANT CHANGE UP (ref 32–36)
MCV RBC AUTO: 96.6 FL — SIGNIFICANT CHANGE UP (ref 80–100)
NITRITE UR-MCNC: NEGATIVE — SIGNIFICANT CHANGE UP
NRBC # BLD: 0 /100 WBCS — SIGNIFICANT CHANGE UP (ref 0–0)
PH UR: 6 — SIGNIFICANT CHANGE UP (ref 5–8)
PLATELET # BLD AUTO: 200 K/UL — SIGNIFICANT CHANGE UP (ref 150–400)
POTASSIUM SERPL-MCNC: 4.3 MMOL/L — SIGNIFICANT CHANGE UP (ref 3.5–5.3)
POTASSIUM SERPL-SCNC: 4.3 MMOL/L — SIGNIFICANT CHANGE UP (ref 3.5–5.3)
PROT UR-MCNC: 15
RBC # BLD: 4.35 M/UL — SIGNIFICANT CHANGE UP (ref 4.2–5.8)
RBC # FLD: 12.4 % — SIGNIFICANT CHANGE UP (ref 10.3–14.5)
SODIUM SERPL-SCNC: 139 MMOL/L — SIGNIFICANT CHANGE UP (ref 135–145)
SP GR SPEC: 1.01 — SIGNIFICANT CHANGE UP (ref 1.01–1.02)
UROBILINOGEN FLD QL: NEGATIVE — SIGNIFICANT CHANGE UP
WBC # BLD: 6.39 K/UL — SIGNIFICANT CHANGE UP (ref 3.8–10.5)
WBC # FLD AUTO: 6.39 K/UL — SIGNIFICANT CHANGE UP (ref 3.8–10.5)

## 2022-06-29 PROCEDURE — 93005 ELECTROCARDIOGRAM TRACING: CPT

## 2022-06-29 PROCEDURE — 87086 URINE CULTURE/COLONY COUNT: CPT

## 2022-06-29 PROCEDURE — 81001 URINALYSIS AUTO W/SCOPE: CPT

## 2022-06-29 PROCEDURE — 36415 COLL VENOUS BLD VENIPUNCTURE: CPT

## 2022-06-29 PROCEDURE — G0463: CPT

## 2022-06-29 PROCEDURE — 93010 ELECTROCARDIOGRAM REPORT: CPT

## 2022-06-29 PROCEDURE — 85027 COMPLETE CBC AUTOMATED: CPT

## 2022-06-29 PROCEDURE — 80048 BASIC METABOLIC PNL TOTAL CA: CPT

## 2022-06-29 RX ORDER — TAMSULOSIN HYDROCHLORIDE 0.4 MG/1
1 CAPSULE ORAL
Qty: 0 | Refills: 0 | DISCHARGE

## 2022-06-29 RX ORDER — MOXIFLOXACIN HYDROCHLORIDE TABLETS, 400 MG 400 MG/1
1 TABLET, FILM COATED ORAL
Qty: 0 | Refills: 0 | DISCHARGE

## 2022-06-29 NOTE — H&P PST ADULT - HISTORY OF PRESENT ILLNESS
83 yo male with HTN, HLD and BPH, presents to PST scheduled for a open repair left inguinal hernia with pain pump on 7/18 with Dr. Rajput. C/O left groin discomfort. Denies abd pain, N/V and bowel changes. Urinary symptomology related to his BPH. S/P greenlight in 2020

## 2022-06-29 NOTE — H&P PST ADULT - NSICDXPASTMEDICALHX_GEN_ALL_CORE_FT
PAST MEDICAL HISTORY:  Bladder calculi     BPH (Benign Prostatic Hyperplasia)     Enlarged prostate without lower urinary tract symptoms (luts)     HLD (hyperlipidemia)     HTN (hypertension)     HTN (hypertension) no meds at present    Nephrolithiasis     Unilateral inguinal hernia, without obstruction or gangrene, not specified as recurrent     Weight loss

## 2022-06-29 NOTE — H&P PST ADULT - PROBLEM SELECTOR PLAN 2
Labs - CBC, BMP, UA, C/S and EKG.  COVID PCR 48-72 before DOS.   MC with PCP  Pre op and Hibiclens instructions reviewed and given. Take routine am Lisinopril and Dutasteride DOS with sip of water. Use Eye Drops. Instructed to hold and/or avoid other NSAIDs and OTC supplements. Tylenol is ok. Verbalized understanding

## 2022-06-29 NOTE — H&P PST ADULT - ANESTHESIA, PREVIOUS REACTION, PROFILE
Urinary retention s/p hernia repair in 2020; Denies family Hx of anesthesia reactions and malignant hyperthermia

## 2022-06-29 NOTE — H&P PST ADULT - NSICDXPASTSURGICALHX_GEN_ALL_CORE_FT
PAST SURGICAL HISTORY:  BPH (Benign Prostatic Hyperplasia) s/p biopsy 2010 - benign    History of prostate surgery Greenlight 2020    Nephrolithiasis s/p litho 2004    S/P herniorrhaphy right side with mesh Sept 2020    S/P tonsillectomy

## 2022-06-29 NOTE — H&P PST ADULT - NSANTHOSAYNRD_GEN_A_CORE
Denies JAIR/No. JAIR screening performed.  STOP BANG Legend: 0-2 = LOW Risk; 3-4 = INTERMEDIATE Risk; 5-8 = HIGH Risk

## 2022-06-30 LAB
CULTURE RESULTS: SIGNIFICANT CHANGE UP
SPECIMEN SOURCE: SIGNIFICANT CHANGE UP

## 2022-07-13 PROBLEM — K40.90 UNILATERAL INGUINAL HERNIA, WITHOUT OBSTRUCTION OR GANGRENE, NOT SPECIFIED AS RECURRENT: Chronic | Status: ACTIVE | Noted: 2022-06-29

## 2022-07-15 ENCOUNTER — OUTPATIENT (OUTPATIENT)
Dept: OUTPATIENT SERVICES | Facility: HOSPITAL | Age: 82
LOS: 1 days | End: 2022-07-15
Payer: MEDICARE

## 2022-07-15 DIAGNOSIS — Z20.828 CONTACT WITH AND (SUSPECTED) EXPOSURE TO OTHER VIRAL COMMUNICABLE DISEASES: ICD-10-CM

## 2022-07-15 DIAGNOSIS — Z98.890 OTHER SPECIFIED POSTPROCEDURAL STATES: Chronic | ICD-10-CM

## 2022-07-15 LAB — SARS-COV-2 RNA SPEC QL NAA+PROBE: SIGNIFICANT CHANGE UP

## 2022-07-15 PROCEDURE — U0005: CPT

## 2022-07-15 PROCEDURE — U0003: CPT

## 2022-07-15 NOTE — ASU PATIENT PROFILE, ADULT - FALL HARM RISK - UNIVERSAL INTERVENTIONS
Bed in lowest position, wheels locked, appropriate side rails in place/Call bell, personal items and telephone in reach/Instruct patient to call for assistance before getting out of bed or chair/Non-slip footwear when patient is out of bed/[place] to call system/Physically safe environment - no spills, clutter or unnecessary equipment/Purposeful Proactive Rounding/Room/bathroom lighting operational, light cord in reach

## 2022-07-17 ENCOUNTER — TRANSCRIPTION ENCOUNTER (OUTPATIENT)
Age: 82
End: 2022-07-17

## 2022-07-18 ENCOUNTER — TRANSCRIPTION ENCOUNTER (OUTPATIENT)
Age: 82
End: 2022-07-18

## 2022-07-18 ENCOUNTER — OUTPATIENT (OUTPATIENT)
Dept: OUTPATIENT SERVICES | Facility: HOSPITAL | Age: 82
LOS: 1 days | End: 2022-07-18
Payer: MEDICARE

## 2022-07-18 ENCOUNTER — APPOINTMENT (OUTPATIENT)
Dept: SURGERY | Facility: HOSPITAL | Age: 82
End: 2022-07-18

## 2022-07-18 VITALS
HEIGHT: 72 IN | HEART RATE: 68 BPM | WEIGHT: 128.09 LBS | SYSTOLIC BLOOD PRESSURE: 150 MMHG | DIASTOLIC BLOOD PRESSURE: 93 MMHG | OXYGEN SATURATION: 99 % | RESPIRATION RATE: 16 BRPM | TEMPERATURE: 99 F

## 2022-07-18 VITALS
HEART RATE: 61 BPM | RESPIRATION RATE: 14 BRPM | SYSTOLIC BLOOD PRESSURE: 144 MMHG | TEMPERATURE: 98 F | DIASTOLIC BLOOD PRESSURE: 74 MMHG | OXYGEN SATURATION: 99 %

## 2022-07-18 DIAGNOSIS — Z98.890 OTHER SPECIFIED POSTPROCEDURAL STATES: Chronic | ICD-10-CM

## 2022-07-18 DIAGNOSIS — K40.90 UNILATERAL INGUINAL HERNIA, WITHOUT OBSTRUCTION OR GANGRENE, NOT SPECIFIED AS RECURRENT: ICD-10-CM

## 2022-07-18 PROCEDURE — 49505 PRP I/HERN INIT REDUC >5 YR: CPT | Mod: LT

## 2022-07-18 PROCEDURE — C1781: CPT

## 2022-07-18 PROCEDURE — 49505 PRP I/HERN INIT REDUC >5 YR: CPT | Mod: AS,LT

## 2022-07-18 PROCEDURE — 11981 INSERTION DRUG DLVR IMPLANT: CPT | Mod: XS,RT

## 2022-07-18 DEVICE — CATH ON-Q SILVERSOAKER EXPANSION 2.5": Type: IMPLANTABLE DEVICE | Site: LEFT | Status: FUNCTIONAL

## 2022-07-18 DEVICE — MESH HERNIA PRE-SHAPED KEYHOLE 2.32X5.4IN: Type: IMPLANTABLE DEVICE | Site: LEFT | Status: FUNCTIONAL

## 2022-07-18 RX ORDER — CEFAZOLIN SODIUM 1 G
2000 VIAL (EA) INJECTION ONCE
Refills: 0 | Status: COMPLETED | OUTPATIENT
Start: 2022-07-18 | End: 2022-07-18

## 2022-07-18 RX ORDER — BUPIVACAINE HCL/PF 7.5 MG/ML
270 VIAL (ML) INJECTION
Refills: 0 | Status: DISCONTINUED | OUTPATIENT
Start: 2022-07-18 | End: 2022-07-18

## 2022-07-18 RX ORDER — OXYCODONE HYDROCHLORIDE 5 MG/1
5 TABLET ORAL ONCE
Refills: 0 | Status: DISCONTINUED | OUTPATIENT
Start: 2022-07-18 | End: 2022-07-18

## 2022-07-18 RX ORDER — ONDANSETRON 8 MG/1
4 TABLET, FILM COATED ORAL ONCE
Refills: 0 | Status: DISCONTINUED | OUTPATIENT
Start: 2022-07-18 | End: 2022-07-18

## 2022-07-18 RX ORDER — SODIUM CHLORIDE 9 MG/ML
1000 INJECTION, SOLUTION INTRAVENOUS
Refills: 0 | Status: DISCONTINUED | OUTPATIENT
Start: 2022-07-18 | End: 2022-07-18

## 2022-07-18 RX ORDER — HYDROMORPHONE HYDROCHLORIDE 2 MG/ML
0.5 INJECTION INTRAMUSCULAR; INTRAVENOUS; SUBCUTANEOUS
Refills: 0 | Status: DISCONTINUED | OUTPATIENT
Start: 2022-07-18 | End: 2022-07-18

## 2022-07-18 RX ORDER — DOCUSATE SODIUM 100 MG
1 CAPSULE ORAL
Qty: 60 | Refills: 0
Start: 2022-07-18

## 2022-07-18 RX ORDER — OXYCODONE AND ACETAMINOPHEN 5; 325 MG/1; MG/1
1 TABLET ORAL
Qty: 15 | Refills: 0
Start: 2022-07-18

## 2022-07-18 RX ORDER — TAMSULOSIN HYDROCHLORIDE 0.4 MG/1
0.4 CAPSULE ORAL ONCE
Refills: 0 | Status: COMPLETED | OUTPATIENT
Start: 2022-07-18 | End: 2022-07-18

## 2022-07-18 RX ADMIN — SODIUM CHLORIDE 60 MILLILITER(S): 9 INJECTION, SOLUTION INTRAVENOUS at 11:39

## 2022-07-18 RX ADMIN — SODIUM CHLORIDE 75 MILLILITER(S): 9 INJECTION, SOLUTION INTRAVENOUS at 14:55

## 2022-07-18 RX ADMIN — TAMSULOSIN HYDROCHLORIDE 0.4 MILLIGRAM(S): 0.4 CAPSULE ORAL at 16:28

## 2022-07-18 NOTE — ASU DISCHARGE PLAN (ADULT/PEDIATRIC) - ASU DC SPECIAL INSTRUCTIONSFT
No reaching, pulling, pushing, lifting >5 lbs  Leave steri strips intact   Do not squeeze pain pump  May sponge bathe only  May take tylenol.  Tylenol is also to be in percocet.  DO not exceed 4000 mg in any 24 hours period.   Regular ambulation  Deep breathing  Ice packs to Left groin- 15 minutes on and 15 minutes off. No reaching, pulling, pushing, lifting >5 lbs  Leave steri strips intact   Do not squeeze pain pump  May sponge bathe only  May take tylenol.  Tylenol is also in percocet.  DO not exceed 4000 mg in any 24 hours period.   Regular ambulation  Deep breathing  Ice packs to Left groin- 15 minutes on and 15 minutes off.

## 2022-07-18 NOTE — ASU DISCHARGE PLAN (ADULT/PEDIATRIC) - NS MD DC FALL RISK RISK
For information on Fall & Injury Prevention, visit: [email or web address] OR  [email or web address] OR  https://www.cdc.gov/steadi/patient.html

## 2022-07-18 NOTE — BRIEF OPERATIVE NOTE - NSICDXBRIEFPROCEDURE_GEN_ALL_CORE_FT
PROCEDURES:  Open repair of inguinal hernia using mesh in adult 18-Jul-2022 15:44:51 Left Kathy Mendoza

## 2022-07-18 NOTE — ASU DISCHARGE PLAN (ADULT/PEDIATRIC) - CARE PROVIDER_API CALL
Bernardo Rajput)  Surgery  [address] [place] [id number]  Phone: [phone]  Fax: [phone]  Follow Up Time:

## 2022-07-21 ENCOUNTER — APPOINTMENT (OUTPATIENT)
Dept: SURGERY | Facility: CLINIC | Age: 82
End: 2022-07-21

## 2022-07-21 VITALS — TEMPERATURE: 97.3 F

## 2022-07-21 PROCEDURE — 99024 POSTOP FOLLOW-UP VISIT: CPT

## 2022-07-21 PROCEDURE — 11982 REMOVE DRUG IMPLANT DEVICE: CPT

## 2022-07-21 NOTE — PHYSICAL EXAM
[JVD] : no jugular venous distention  [Normal Breath Sounds] : Normal breath sounds [Normal Heart Sounds] : normal heart sounds [Calm] : calm [de-identified] : well developed white male in no distress [de-identified] : noninjected and nonicteric [de-identified] : without adenopathy [de-identified] : benign [de-identified] : incision clean and closed, with scrotal swelling and ecchymosis [de-identified] : without calf pain or swelling

## 2022-07-21 NOTE — PLAN
[FreeTextEntry1] : pt to take colace 100 mg three times per day/\par if no bowel movement in 48 hours I recommend he take one bottle of magnesium citrate

## 2022-07-21 NOTE — HISTORY OF PRESENT ILLNESS
[de-identified] : s/p open repair of left inguinal hernia with a pain pump 7/18/22 [de-identified] : Pt c/o constipation, states he did not get the stool softener. He denies any fevers or chills, voiding without difficulty.

## 2022-07-28 ENCOUNTER — APPOINTMENT (OUTPATIENT)
Dept: SURGERY | Facility: CLINIC | Age: 82
End: 2022-07-28

## 2022-07-28 VITALS — TEMPERATURE: 97.7 F

## 2022-07-28 PROCEDURE — 99024 POSTOP FOLLOW-UP VISIT: CPT

## 2022-07-28 NOTE — PHYSICAL EXAM
[Normal Breath Sounds] : Normal breath sounds [Normal Heart Sounds] : normal heart sounds [Normal Rate and Rhythm] : normal rate and rhythm [Calm] : calm [de-identified] : well developed white male in no distress [de-identified] : normal bowel sounds, without distension or tenderness. [de-identified] : incision clean and closed, repair intact [de-identified] : without calf pain or swelling

## 2022-07-28 NOTE — HISTORY OF PRESENT ILLNESS
[de-identified] : s/p open repair of left inguinal hernia with a pain pump 7/18/22 [de-identified] : Pt had a large bowel movement after taking magnesium citrate and feels much better, He denies any fevers or chills. He has not had any urinary problems.

## 2022-08-08 NOTE — ASU PATIENT PROFILE, ADULT - PMH
Called home and cell number both were noted as non working numbers. Letter mailed to patient home address. Bladder calculi    BPH (Benign Prostatic Hyperplasia)    Enlarged prostate without lower urinary tract symptoms (luts)    HLD (hyperlipidemia)    HTN (hypertension)  no meds at present  HTN (hypertension)    Nephrolithiasis    Weight loss

## 2022-08-09 ENCOUNTER — APPOINTMENT (OUTPATIENT)
Dept: SURGERY | Facility: CLINIC | Age: 82
End: 2022-08-09

## 2022-08-09 PROCEDURE — 99024 POSTOP FOLLOW-UP VISIT: CPT

## 2022-08-09 NOTE — PHYSICAL EXAM
[Normal Breath Sounds] : Normal breath sounds [Normal Heart Sounds] : normal heart sounds [Normal Rate and Rhythm] : normal rate and rhythm [Calm] : calm [de-identified] : well developed male in no distress [de-identified] : benign [de-identified] : without cva tendernss [de-identified] : incision clean and closed, repair in tact

## 2022-08-09 NOTE — HISTORY OF PRESENT ILLNESS
[de-identified] : s/p open repair of left inguinal hernia with a pain pump 7/18/22 [de-identified] : Pt with decreased swelling and decreased groin pain with normal GI functioning, Pt is now c/o back pain. Pt states he had the pain before surgery but now it is happening more frequently. He denies any fevers or chills. Without urinary symptoms

## 2022-08-23 ENCOUNTER — APPOINTMENT (OUTPATIENT)
Dept: SURGERY | Facility: CLINIC | Age: 82
End: 2022-08-23

## 2022-08-23 VITALS — TEMPERATURE: 98.8 F

## 2022-08-23 DIAGNOSIS — Z87.19 OTHER SPECIFIED POSTPROCEDURAL STATES: ICD-10-CM

## 2022-08-23 DIAGNOSIS — Z98.890 OTHER SPECIFIED POSTPROCEDURAL STATES: ICD-10-CM

## 2022-08-23 PROCEDURE — 99024 POSTOP FOLLOW-UP VISIT: CPT

## 2022-08-23 NOTE — PHYSICAL EXAM
[Normal Breath Sounds] : Normal breath sounds [Normal Heart Sounds] : normal heart sounds [Normal Rate and Rhythm] : normal rate and rhythm [No Rash or Lesion] : No rash or lesion [Calm] : calm [de-identified] : well developed white male in no distress [de-identified] : normal bowel sounds, without distension or tenderness. [de-identified] : incision clean and closed repair intact [de-identified] : without calf pain or swelling

## 2022-08-23 NOTE — HISTORY OF PRESENT ILLNESS
[de-identified] : s/p open repair of left inguinal hernia with a pain pump 7/18/22 [de-identified] : pt improved, decreased back pain, Denies any further back pain, 'I sneezed and it went away". Normal GI functioning.

## 2022-10-26 ENCOUNTER — APPOINTMENT (OUTPATIENT)
Dept: ORTHOPEDIC SURGERY | Facility: CLINIC | Age: 82
End: 2022-10-26

## 2022-10-26 VITALS — WEIGHT: 135 LBS | HEIGHT: 72 IN | BODY MASS INDEX: 18.28 KG/M2

## 2022-10-26 DIAGNOSIS — S33.5XXD SPRAIN OF LIGAMENTS OF LUMBAR SPINE, SUBSEQUENT ENCOUNTER: ICD-10-CM

## 2022-10-26 DIAGNOSIS — M47.817 SPONDYLOSIS W/OUT MYELOPATHY OR RADICULOPATHY, LUMBOSACRAL REGION: ICD-10-CM

## 2022-10-26 PROCEDURE — 99213 OFFICE O/P EST LOW 20 MIN: CPT

## 2022-10-26 RX ORDER — LISINOPRIL 10 MG/1
10 TABLET ORAL
Refills: 0 | Status: COMPLETED | COMMUNITY
End: 2022-10-26

## 2022-10-26 NOTE — REASON FOR VISIT
[FreeTextEntry2] : Patient feels some improvement since last visit. Patient complains of some intermittent sharp pain in his L Back left Side. Pain occurs infrequently and goes away with rest.He feels it may be due to his recent  Hernia  surgery but his surgeon has said no.

## 2022-10-26 NOTE — HISTORY OF PRESENT ILLNESS
[Lower back] : lower back [Gradual] : gradual [Intermittent] : intermittent [Rest] : rest [Retired] : Work status: retired [de-identified] : 3/25/2022 [] : Post Surgical Visit: no [de-identified] : Dr. Galloway

## 2022-10-26 NOTE — ASSESSMENT
[FreeTextEntry1] : pain is not constant and is relieved by moving; so will continue with this and do home exercises. Offered to get MRI, but he wants to wait. Will also try voltaren gel or Bengay. Occasional aleve.

## 2022-12-08 ENCOUNTER — OFFICE (OUTPATIENT)
Dept: URBAN - METROPOLITAN AREA CLINIC 102 | Facility: CLINIC | Age: 82
Setting detail: OPHTHALMOLOGY
End: 2022-12-08
Payer: MEDICARE

## 2022-12-08 DIAGNOSIS — H25.13: ICD-10-CM

## 2022-12-08 DIAGNOSIS — H35.373: ICD-10-CM

## 2022-12-08 DIAGNOSIS — D31.32: ICD-10-CM

## 2022-12-08 DIAGNOSIS — D31.31: ICD-10-CM

## 2022-12-08 DIAGNOSIS — H16.223: ICD-10-CM

## 2022-12-08 DIAGNOSIS — H35.363: ICD-10-CM

## 2022-12-08 DIAGNOSIS — H43.393: ICD-10-CM

## 2022-12-08 PROCEDURE — 92014 COMPRE OPH EXAM EST PT 1/>: CPT | Performed by: OPHTHALMOLOGY

## 2022-12-08 PROCEDURE — 92134 CPTRZ OPH DX IMG PST SGM RTA: CPT | Performed by: OPHTHALMOLOGY

## 2022-12-08 ASSESSMENT — AXIALLENGTH_DERIVED
OS_AL: 25.8867
OD_AL: 25.5935
OD_AL: 26.0011
OS_AL: 26.2433
OD_AL: 26.0011
OS_AL: 26.2433

## 2022-12-08 ASSESSMENT — REFRACTION_MANIFEST
OS_CYLINDER: -2.00
OD_SPHERE: -4.25
OD_ADD: +2.50
OD_AXIS: 090
OD_SPHERE: -4.25
OD_AXIS: 090
OD_CYLINDER: -2.25
OS_AXIS: 090
OS_AXIS: 090
OS_ADD: +2.50
OS_ADD: +2.50
OD_CYLINDER: -2.25
OS_CYLINDER: -2.00
OS_VA1: 20/30
OS_SPHERE: -5.00
OD_VA1: 20/25-
OS_SPHERE: -5.00
OD_ADD: +2.50

## 2022-12-08 ASSESSMENT — KERATOMETRY
OD_K2POWER_DIOPTERS: 43.75
OS_K1POWER_DIOPTERS: 42.50
OD_K1POWER_DIOPTERS: 42.75
OS_K2POWER_DIOPTERS: 44.25
OS_AXISANGLE_DEGREES: 10
OD_AXISANGLE_DEGREES: 162

## 2022-12-08 ASSESSMENT — REFRACTION_CURRENTRX
OD_VPRISM_DIRECTION: PROGS
OD_OVR_VA: 20/
OS_SPHERE: -5.00
OS_ADD: +2.25
OS_AXIS: 90
OS_VPRISM_DIRECTION: PROGS
OD_ADD: +2.25
OS_OVR_VA: 20/
OD_SPHERE: -5.00
OD_AXIS: 90
OS_CYLINDER: -1.50
OD_CYLINDER: -1.75

## 2022-12-08 ASSESSMENT — SPHEQUIV_DERIVED
OS_SPHEQUIV: -5.25
OS_SPHEQUIV: -6
OS_SPHEQUIV: -6
OD_SPHEQUIV: -4.5
OD_SPHEQUIV: -5.375
OD_SPHEQUIV: -5.375

## 2022-12-08 ASSESSMENT — TONOMETRY
OS_IOP_MMHG: 17
OD_IOP_MMHG: 16

## 2022-12-08 ASSESSMENT — REFRACTION_AUTOREFRACTION
OD_AXIS: 87
OD_CYLINDER: -2.00
OS_SPHERE: -4.00
OS_AXIS: 101
OS_CYLINDER: -2.50
OD_SPHERE: -3.50

## 2022-12-08 ASSESSMENT — LID EXAM ASSESSMENTS
OS_BLEPHARITIS: LUL T
OD_BLEPHARITIS: RUL T

## 2022-12-08 ASSESSMENT — VISUAL ACUITY
OD_BCVA: 20/30
OS_BCVA: 20/40-2

## 2022-12-08 ASSESSMENT — CONFRONTATIONAL VISUAL FIELD TEST (CVF)
OD_FINDINGS: FULL
OS_FINDINGS: FULL

## 2023-06-08 ENCOUNTER — OFFICE (OUTPATIENT)
Dept: URBAN - METROPOLITAN AREA CLINIC 102 | Facility: CLINIC | Age: 83
Setting detail: OPHTHALMOLOGY
End: 2023-06-08
Payer: MEDICARE

## 2023-06-08 DIAGNOSIS — D31.32: ICD-10-CM

## 2023-06-08 DIAGNOSIS — H16.223: ICD-10-CM

## 2023-06-08 DIAGNOSIS — H35.373: ICD-10-CM

## 2023-06-08 DIAGNOSIS — H25.13: ICD-10-CM

## 2023-06-08 DIAGNOSIS — H35.363: ICD-10-CM

## 2023-06-08 DIAGNOSIS — H43.393: ICD-10-CM

## 2023-06-08 PROCEDURE — 92020 GONIOSCOPY: CPT | Performed by: OPHTHALMOLOGY

## 2023-06-08 PROCEDURE — 92014 COMPRE OPH EXAM EST PT 1/>: CPT | Performed by: OPHTHALMOLOGY

## 2023-06-08 PROCEDURE — 92134 CPTRZ OPH DX IMG PST SGM RTA: CPT | Performed by: OPHTHALMOLOGY

## 2023-06-08 ASSESSMENT — SPHEQUIV_DERIVED
OD_SPHEQUIV: -5.375
OS_SPHEQUIV: -6
OS_SPHEQUIV: -6
OS_SPHEQUIV: -5.625
OD_SPHEQUIV: -5.375
OD_SPHEQUIV: -4.625

## 2023-06-08 ASSESSMENT — CONFRONTATIONAL VISUAL FIELD TEST (CVF)
OS_FINDINGS: FULL
OD_FINDINGS: FULL

## 2023-06-08 ASSESSMENT — REFRACTION_CURRENTRX
OD_VPRISM_DIRECTION: PROGS
OS_CYLINDER: -1.50
OD_OVR_VA: 20/
OS_SPHERE: -5.00
OD_SPHERE: -5.00
OS_OVR_VA: 20/
OD_CYLINDER: -1.75
OS_ADD: +2.25
OS_VPRISM_DIRECTION: PROGS
OD_ADD: +2.25
OS_AXIS: 90
OD_AXIS: 90

## 2023-06-08 ASSESSMENT — REFRACTION_MANIFEST
OD_CYLINDER: -2.25
OD_ADD: +2.50
OS_SPHERE: -5.00
OS_VA1: 20/30
OS_CYLINDER: -2.00
OS_AXIS: 090
OD_SPHERE: -4.25
OS_CYLINDER: -2.00
OS_ADD: +2.50
OS_ADD: +2.50
OD_SPHERE: -4.25
OD_AXIS: 090
OS_SPHERE: -5.00
OD_VA1: 20/25-
OD_CYLINDER: -2.25
OD_ADD: +2.50
OD_AXIS: 090
OS_AXIS: 090

## 2023-06-08 ASSESSMENT — VISUAL ACUITY
OS_BCVA: 20/30-1
OD_BCVA: 20/40

## 2023-06-08 ASSESSMENT — TONOMETRY
OD_IOP_MMHG: 15
OS_IOP_MMHG: 16

## 2023-06-08 ASSESSMENT — REFRACTION_AUTOREFRACTION
OD_CYLINDER: -1.75
OS_SPHERE: -4.50
OS_AXIS: 100
OD_SPHERE: -3.75
OD_AXIS: 090
OS_CYLINDER: -2.25

## 2023-06-08 ASSESSMENT — KERATOMETRY
OD_K1POWER_DIOPTERS: 42.75
OD_AXISANGLE_DEGREES: 174
METHOD_AUTO_MANUAL: AUTO
OD_K2POWER_DIOPTERS: 43.75
OS_AXISANGLE_DEGREES: 011
OS_K2POWER_DIOPTERS: 44.25
OS_K1POWER_DIOPTERS: 42.75

## 2023-06-08 ASSESSMENT — AXIALLENGTH_DERIVED
OD_AL: 26.0011
OD_AL: 26.0011
OS_AL: 26.1866
OS_AL: 26.0079
OS_AL: 26.1866
OD_AL: 25.6509

## 2023-06-08 ASSESSMENT — LID EXAM ASSESSMENTS
OD_BLEPHARITIS: RUL T
OS_BLEPHARITIS: LUL T

## 2023-08-08 ENCOUNTER — EMERGENCY (EMERGENCY)
Facility: HOSPITAL | Age: 83
LOS: 1 days | Discharge: HOME | End: 2023-08-08
Attending: EMERGENCY MEDICINE | Admitting: EMERGENCY MEDICINE
Payer: MEDICARE

## 2023-08-08 VITALS
WEIGHT: 130.07 LBS | RESPIRATION RATE: 16 BRPM | TEMPERATURE: 98 F | HEIGHT: 72 IN | DIASTOLIC BLOOD PRESSURE: 74 MMHG | HEART RATE: 68 BPM | OXYGEN SATURATION: 97 % | SYSTOLIC BLOOD PRESSURE: 114 MMHG

## 2023-08-08 VITALS
TEMPERATURE: 98 F | DIASTOLIC BLOOD PRESSURE: 88 MMHG | HEART RATE: 96 BPM | SYSTOLIC BLOOD PRESSURE: 134 MMHG | RESPIRATION RATE: 18 BRPM | OXYGEN SATURATION: 96 %

## 2023-08-08 DIAGNOSIS — Z98.890 OTHER SPECIFIED POSTPROCEDURAL STATES: Chronic | ICD-10-CM

## 2023-08-08 LAB
ALBUMIN SERPL ELPH-MCNC: 3.5 G/DL — SIGNIFICANT CHANGE UP (ref 3.3–5)
ALP SERPL-CCNC: 397 U/L — HIGH (ref 40–120)
ALT FLD-CCNC: 125 U/L — HIGH (ref 12–78)
ANION GAP SERPL CALC-SCNC: 6 MMOL/L — SIGNIFICANT CHANGE UP (ref 5–17)
AST SERPL-CCNC: 210 U/L — HIGH (ref 15–37)
BASOPHILS # BLD AUTO: 0.04 K/UL — SIGNIFICANT CHANGE UP (ref 0–0.2)
BASOPHILS NFR BLD AUTO: 0.5 % — SIGNIFICANT CHANGE UP (ref 0–2)
BILIRUB SERPL-MCNC: 1 MG/DL — SIGNIFICANT CHANGE UP (ref 0.2–1.2)
BUN SERPL-MCNC: 28 MG/DL — HIGH (ref 7–23)
CALCIUM SERPL-MCNC: 9.7 MG/DL — SIGNIFICANT CHANGE UP (ref 8.5–10.1)
CHLORIDE SERPL-SCNC: 104 MMOL/L — SIGNIFICANT CHANGE UP (ref 96–108)
CO2 SERPL-SCNC: 29 MMOL/L — SIGNIFICANT CHANGE UP (ref 22–31)
CREAT SERPL-MCNC: 1.6 MG/DL — HIGH (ref 0.5–1.3)
EGFR: 43 ML/MIN/1.73M2 — LOW
EOSINOPHIL # BLD AUTO: 0.02 K/UL — SIGNIFICANT CHANGE UP (ref 0–0.5)
EOSINOPHIL NFR BLD AUTO: 0.2 % — SIGNIFICANT CHANGE UP (ref 0–6)
GLUCOSE SERPL-MCNC: 102 MG/DL — HIGH (ref 70–99)
HCT VFR BLD CALC: 38.6 % — LOW (ref 39–50)
HGB BLD-MCNC: 12.8 G/DL — LOW (ref 13–17)
IMM GRANULOCYTES NFR BLD AUTO: 0.4 % — SIGNIFICANT CHANGE UP (ref 0–0.9)
LYMPHOCYTES # BLD AUTO: 0.79 K/UL — LOW (ref 1–3.3)
LYMPHOCYTES # BLD AUTO: 9.4 % — LOW (ref 13–44)
MCHC RBC-ENTMCNC: 31.9 PG — SIGNIFICANT CHANGE UP (ref 27–34)
MCHC RBC-ENTMCNC: 33.2 GM/DL — SIGNIFICANT CHANGE UP (ref 32–36)
MCV RBC AUTO: 96.3 FL — SIGNIFICANT CHANGE UP (ref 80–100)
MONOCYTES # BLD AUTO: 0.6 K/UL — SIGNIFICANT CHANGE UP (ref 0–0.9)
MONOCYTES NFR BLD AUTO: 7.2 % — SIGNIFICANT CHANGE UP (ref 2–14)
NEUTROPHILS # BLD AUTO: 6.88 K/UL — SIGNIFICANT CHANGE UP (ref 1.8–7.4)
NEUTROPHILS NFR BLD AUTO: 82.3 % — HIGH (ref 43–77)
NRBC # BLD: 0 /100 WBCS — SIGNIFICANT CHANGE UP (ref 0–0)
PLATELET # BLD AUTO: 208 K/UL — SIGNIFICANT CHANGE UP (ref 150–400)
POTASSIUM SERPL-MCNC: 5.3 MMOL/L — SIGNIFICANT CHANGE UP (ref 3.5–5.3)
POTASSIUM SERPL-SCNC: 5.3 MMOL/L — SIGNIFICANT CHANGE UP (ref 3.5–5.3)
PROT SERPL-MCNC: 7.6 G/DL — SIGNIFICANT CHANGE UP (ref 6–8.3)
RBC # BLD: 4.01 M/UL — LOW (ref 4.2–5.8)
RBC # FLD: 12.6 % — SIGNIFICANT CHANGE UP (ref 10.3–14.5)
SODIUM SERPL-SCNC: 139 MMOL/L — SIGNIFICANT CHANGE UP (ref 135–145)
WBC # BLD: 8.36 K/UL — SIGNIFICANT CHANGE UP (ref 3.8–10.5)
WBC # FLD AUTO: 8.36 K/UL — SIGNIFICANT CHANGE UP (ref 3.8–10.5)

## 2023-08-08 PROCEDURE — 83735 ASSAY OF MAGNESIUM: CPT

## 2023-08-08 PROCEDURE — 99284 EMERGENCY DEPT VISIT MOD MDM: CPT

## 2023-08-08 PROCEDURE — 71250 CT THORAX DX C-: CPT | Mod: 26,MA

## 2023-08-08 PROCEDURE — 80053 COMPREHEN METABOLIC PANEL: CPT

## 2023-08-08 PROCEDURE — 99284 EMERGENCY DEPT VISIT MOD MDM: CPT | Mod: 25

## 2023-08-08 PROCEDURE — 36415 COLL VENOUS BLD VENIPUNCTURE: CPT

## 2023-08-08 PROCEDURE — 85025 COMPLETE CBC W/AUTO DIFF WBC: CPT

## 2023-08-08 PROCEDURE — 74176 CT ABD & PELVIS W/O CONTRAST: CPT | Mod: 26,MA

## 2023-08-08 PROCEDURE — 74176 CT ABD & PELVIS W/O CONTRAST: CPT | Mod: MA

## 2023-08-08 PROCEDURE — 71250 CT THORAX DX C-: CPT | Mod: MA

## 2023-08-08 NOTE — ED ADULT TRIAGE NOTE - CHIEF COMPLAINT QUOTE
83y M from home, ambulatory, c/o generalized weakness/fatigue x1 week. 1 episode diarrhea last night. decreased appetite... denies dizziness/blurry vision/CP/SOB

## 2023-08-08 NOTE — ED ADULT NURSE NOTE - OBJECTIVE STATEMENT
Received the patient in the Er. patient is alert and oriented. Skin warm and dry. C/o weakness. Not able to drink properly. patient is ambulatory.

## 2023-08-08 NOTE — ED PROVIDER NOTE - PROGRESS NOTE DETAILS
CT concerning for liver mets. Discussed the results of all diagnostic testing in ED and copies of all reports given. Pts info inputted into Cancer Care Direct Program. [facility] referral coordinator aware of case as well. Pt was given an opportunity to have all questions answered to satisfaction.  Discussed the importance of prompt, close medical follow-up. ED return precautions discussed at length.  Pt verbalizes agreement and understanding of plan and ED return precautions. Pt well appearing, stable for DC home. No emergent concerns at this time.

## 2023-08-08 NOTE — ED ADULT NURSE NOTE - NSFALLHARMRISKINTERV_ED_ALL_ED

## 2023-08-08 NOTE — ED PROVIDER NOTE - NSFOLLOWUPINSTRUCTIONS_ED_ALL_ED_FT
Follow up with oncology within the next 1-2 days  Return to the ED immediately for new or worsening symptoms as we discussed.    English    Metastatic Cancer  A comparison of the same breast, showing the four stages of breast cancer, from early stage cancer to metastatic cancer.   Metastatic cancer is cancer that has spread from the place where it started (primary site) to another part of the body. The process of cancer spreading from the primary site is called metastasis. When cancer cells metastasize, they do not change the way they look or the way they affect the body. Cancers are named based on the primary site, even if the cancer has spread to another body part. For example, when primary breast cancer spreads to the lung it is called metastatic breast cancer, not lung cancer.    All types of cancer can spread. Some cancers are more likely to metastasize than others. The most common places that cancers metastasize to are:  The bones.  The liver.  The lungs.  What are the causes?  Metastasis occurs when cancer cells spread from the primary site to another part of the body. Cancer cells can spread:  Directly from one part of the body to a nearby area (local invasion).  Into a lymph vessel. Cancer cells can be carried through the lymph system to lymph nodes and other parts of the body. The lymph system is a network of vessels and nodes that help protect against infections.  Into the blood vessels. Cancer cells can be carried to other parts of the body through the bloodstream.  What increases the risk?  The following factors may make you more likely to develop this condition:  The type of cancer that you have.  The stage and grade of your primary cancer at the time of diagnosis.  The grade and stage of the tumor. Grading and staging predict how quickly cancer cells will grow and their chances of metastasis.  Having a large primary tumor.  Having a higher grade of tumor.  Having a tumor with deeper growth.  Having a tumor that has entered the lymph system.  What are the signs or symptoms?  Symptoms of this condition will depend on the location where the cancer has spread. Symptoms may include:  Weakness or lack of energy.  Weight loss.  Tumor growths that can be felt or seen.  Other symptoms may include:  Trouble breathing, if the cancer has spread to the lungs.  An enlarged liver, or the skin or the white parts of your eyes turning yellow (jaundice), if the cancer has spread to the liver.  Pain in the bones or broken bones (fractures), if the cancer has spread to the bones.  Headaches, dizziness, or seizures, if the cancer has spread to the brain.  Some people with this condition may have no symptoms.    How is this diagnosed?  This condition may be diagnosed based on:  Your symptoms.  A physical exam. This may include:  Blood tests to check for certain substances that are secreted by tumors (tumor markers).  Tumor markers that increase after treatment can indicate metastasis.  Tumor markers may be used to help diagnose metastasis in some cancers, such as colon and prostate cancer.  Not all cancers have tumor markers.  Imaging studies, such as:  X-rays.  Ultrasound.  MRI.  Other imaging tests, such as CT scans, bone scans, PET scans, and mammograms.  Removing a tissue sample to look at it under a microscope (biopsy). If the cells at the new cancer site are similar to cancer cells from the primary site, this can confirm metastatic cancer.  Testing fluid samples from the lungs, spine, or abdomen for metastatic cancer cells.  How is this treated?  There are many options for treating metastatic cancer. Your treatment will depend on:  The type of cancer you have.  How far your cancer has advanced.  Your general health.  Treatment may not be able to cure metastatic cancer, but it can often relieve the symptoms. In many cases, you may have a combination of treatments. Options may include:  Surgery.  Medicines, such as:  Medicines that kill cancer cells (chemotherapy).  Medicine to remove or block hormones that allow the cancer to grow (hormone therapy).  Medicines that help your body's disease-fighting system (immune system) fight cancer cells (immunotherapy).  Medicine to attack a tumor's genes and proteins (targeted therapy). These medicines attack the genes and proteins that allow a tumor to grow while limiting damage to healthy cells.  Other medicines to manage symptoms related to cancer or cancer treatments. This includes bone-targeted treatment for people with bone metastases.  High-energy rays that kill cancer cells (radiation therapy).  Treatments that help your body fight cancer (biologic therapy).  Freezing cancer cells using gas or liquid that is delivered through a needle (cryoablation).  Destroying cancer cells using high-energy radio waves that are delivered through a needle-like probe (radiofrequency ablation).  A procedure to block the artery that supplies blood to the tumor, which kills the cancer cells (embolization).  Participating in clinical trials to see if new or experimental treatments are effective.  Follow these instructions at home:  Eating and drinking    Some of your treatments might affect your appetite and your ability to chew and swallow. If you are having problems eating, or if you do not have an appetite, meet with a dietitian.  If you have side effects that affect eating, it may help to:  Eat smaller meals and snacks often.  Drink high-nutrition and high-calorie shakes or supplements.  Eat bland and soft foods that are easy to eat.  Avoid foods that are hot, spicy, or hard to swallow.  Lifestyle    A sign showing that a person should not smoke.  Do not drink alcohol.  Do not use any products that contain nicotine or tobacco. These products include cigarettes, chewing tobacco, and vaping devices, such as e-cigarettes. If you need help quitting, ask your health care provider.  General instructions    Take over-the-counter and prescription medicines only as told by your health care provider. This includes vitamins, herbs, and supplements.  Work with your health care provider to manage any side effects of treatment.  Keep all follow-up visits. This is important.  Where to find more information  American Cancer Society: www.cancer.org  National Cancer [place] (NCI): www.cancer.gov  Contact a health care provider if:  You notice that you bruise or bleed easily.  You are losing weight without trying.  You have new or increased fatigue or weakness.  You have a fever.  Get help right away if:  You have a seizure.  You have a sudden increase in pain.  You have shortness of breath.  You have chest pain.  These symptoms may be an emergency. Get help right away. Call 911.  Do not wait to see if the symptoms will go away.  Do not drive yourself to the hospital.  Summary  Metastatic cancer is cancer that has spread from the place where it started (primary site) to another part of the body.  Cancer cells can spread directly from one part of the body to a nearby area, or they may spread through the lymph system or the bloodstream.  Your risk for metastatic cancer depends on the type of cancer you have and the stage and grade of your primary cancer.  Treatment may not be able to cure metastatic cancer, but it can often relieve the symptoms.  This information is not intended to replace advice given to you by your health care provider. Make sure you discuss any questions you have with your health care provider.    Document Revised: 09/27/2022 Document Reviewed: 09/27/2022  Elsevier Patient Education © 2023 [employer] Inc.

## 2023-08-08 NOTE — ED PROVIDER NOTE - ATTENDING APP SHARED VISIT CONTRIBUTION OF CARE
84yo male with abd fullness and decreased appetitie for a month, +weight loss, no vomiiting or diarrhea  exam: thin, abd soft, non tender no rebound or guarding  plan: labs, ct  agree with assessment and plan of PA

## 2023-08-08 NOTE — ED PROVIDER NOTE - CARE PROVIDER_API CALL
Everardo Massey Yvrose  Medical Oncology  [address], [address]  [place] [address]  Phone: [phone]  Fax: [phone]  Follow Up Time:

## 2023-08-08 NOTE — ED PROVIDER NOTE - PATIENT PORTAL LINK FT
You can access the FollowMyHealth Patient Portal offered by [facility] by registering at the following website: [email or web address]. By joining [facility]’s FollowMyHealth portal, you will also be able to view your health information using other applications (apps) compatible with our system.

## 2023-08-08 NOTE — ED PROVIDER NOTE - OBJECTIVE STATEMENT
84 yo M PMHx HTN presents to ED c/o generalized weakness, malaise, decreased appetite, poor po intake, early satiety and nonspecific diffuse abd discomfort x 1 month. Pt states symptoms are getting worse. Reports at least 10-15 lb unintentional weight loss. +diarrhea x last night. Denies chest pain, back pain, SOB, cough, N/V, fever/chills, urinary symptoms, melena.

## 2023-08-08 NOTE — ED PROVIDER NOTE - DIFFERENTIAL DIAGNOSIS
ddxd considered but not limited to mass, ca, obstruction, gastritis, divertic Differential Diagnosis

## 2023-08-08 NOTE — ED ADULT NURSE NOTE - NURSING GU BLADDER
non-distended Infliximab Counseling:  I discussed with the patient the risks of infliximab including but not limited to myelosuppression, immunosuppression, autoimmune hepatitis, demyelinating diseases, lymphoma, and serious infections.  The patient understands that monitoring is required including a PPD at baseline and must alert us or the primary physician if symptoms of infection or other concerning signs are noted.

## 2023-08-14 ENCOUNTER — RESULT REVIEW (OUTPATIENT)
Age: 83
End: 2023-08-14

## 2023-08-15 ENCOUNTER — OUTPATIENT (OUTPATIENT)
Dept: OUTPATIENT SERVICES | Facility: HOSPITAL | Age: 83
LOS: 1 days | End: 2023-08-15
Payer: MEDICARE

## 2023-08-15 ENCOUNTER — APPOINTMENT (OUTPATIENT)
Dept: MRI IMAGING | Facility: CLINIC | Age: 83
End: 2023-08-15
Payer: MEDICARE

## 2023-08-15 DIAGNOSIS — K76.9 LIVER DISEASE, UNSPECIFIED: ICD-10-CM

## 2023-08-15 DIAGNOSIS — Z98.890 OTHER SPECIFIED POSTPROCEDURAL STATES: Chronic | ICD-10-CM

## 2023-08-15 PROCEDURE — 74183 MRI ABD W/O CNTR FLWD CNTR: CPT

## 2023-08-15 PROCEDURE — 74183 MRI ABD W/O CNTR FLWD CNTR: CPT | Mod: 26

## 2023-08-15 PROCEDURE — 72197 MRI PELVIS W/O & W/DYE: CPT | Mod: 26

## 2023-08-15 PROCEDURE — 72197 MRI PELVIS W/O & W/DYE: CPT

## 2023-08-15 PROCEDURE — A9585: CPT

## 2023-08-25 ENCOUNTER — APPOINTMENT (OUTPATIENT)
Dept: INTERVENTIONAL RADIOLOGY/VASCULAR | Facility: CLINIC | Age: 83
End: 2023-08-25
Payer: MEDICARE

## 2023-08-25 VITALS — HEIGHT: 72 IN

## 2023-08-25 DIAGNOSIS — N13.8 BENIGN PROSTATIC HYPERPLASIA WITH LOWER URINARY TRACT SYMPMS: ICD-10-CM

## 2023-08-25 DIAGNOSIS — N40.1 BENIGN PROSTATIC HYPERPLASIA WITH LOWER URINARY TRACT SYMPMS: ICD-10-CM

## 2023-08-25 DIAGNOSIS — K76.9 LIVER DISEASE, UNSPECIFIED: ICD-10-CM

## 2023-08-25 PROCEDURE — 99203 OFFICE O/P NEW LOW 30 MIN: CPT | Mod: 95

## 2023-08-25 RX ORDER — DUTASTERIDE 0.5 MG/1
0.5 CAPSULE, LIQUID FILLED ORAL
Refills: 0 | Status: COMPLETED | COMMUNITY
End: 2023-08-25

## 2023-08-25 RX ORDER — LISINOPRIL 5 MG/1
5 TABLET ORAL
Refills: 0 | Status: ACTIVE | COMMUNITY

## 2023-08-25 NOTE — REVIEW OF SYSTEMS
[Feeling Tired] : feeling tired [SOB on Exertion] : shortness of breath during exertion [Fever] : no fever [Chills] : no chills [Nosebleeds] : no nosebleeds [Sore Throat] : no sore throat [Hoarseness] : no hoarseness [Chest Pain] : no chest pain [Shortness Of Breath] : no shortness of breath [Wheezing] : no wheezing [Cough] : no cough [Abdominal Pain] : no abdominal pain [Vomiting] : no vomiting [Constipation] : no constipation [Diarrhea] : no diarrhea [Easy Bleeding] : no tendency for easy bleeding [Easy Bruising] : no tendency for easy bruising [FreeTextEntry7] : endorses fullness, nausea

## 2023-08-25 NOTE — PLAN
Jp, just an FYI.  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  I have sent her to a hand specialist at [facility], the same lauren who worked on my finger.  I also wanted to let you know that she is noncompliant with her hypoglycemic regimen.  She was told that without tighter glycemic control, her neuropathy will progress at a more rapid pace. [TextEntry] : *Patient was given pre op instructions at today's visit.  *No eating after midnight the night before. Patient can have water up until two hrs before scheduled procedure.  *No OTC Aspirin five days before procedure, no Ibuprofen, Advil , Aleve, Motrin for 24 hrs prior to procedure.  *Patient was instructed to take her meds two hrs prior to procedure with water. *You must make arrangements prior to procedure for a family member/friend to drive you home after your procedure.  *Please bring your ID and insurance card with you on the day of procedure.  *Please leave all jewelry and valuables at home on the day of procedure.

## 2023-08-25 NOTE — ASSESSMENT
[FreeTextEntry1] : 83-year-old male with widespread metastatic disease.  MRI 8/15/23 demonstrated innumerable liver lesions suspicious for metastasis.  Comorbidities: BPH. Arthritis.  Assessment: Patient is an appropriate candidate for image guided liver mass needle biopsy.  Procedure, its risk, benefits and alternatives were discussed with the patient and his wife and informed consent obtained.  Plan: 1.  Coags ordered and needs to be checked before procedure. 2.  Supine position. 3.  Sedation by anesthesia.

## 2023-08-25 NOTE — HISTORY OF PRESENT ILLNESS
"Location in Record and Date:  X-Ray ankle-6/15/2017    "Small vascular calcifications are noted at the ankle."    Location in Record and Date:  X-Ray Knee Bilateral-9/21/2011    "THE PATIENT HAS CALCIFIC ATHEROSCLEROSIS."    Other Chronic Conditions:  HLD  Medications:  aspirin 81 mg, Lipitor 10 mg  " [FreeTextEntry1] : Patient is a 83-year-old male with a past medical history of HTN and liver lesion. Patient had been experiencing generalized weakness, malaise, decreased appetite, abdominal pain for 1 month and unintentional weight loss. He presented to the emergency room and was found to have multiple pulmonary nodules and liver lesion suspicious for metastatic disease. Patient has now been referred to IR by Dr. Guerin for consultation regarding a liver lesion biopsy.   Patient denies recent fever, chills, shortness of breath, chest pain, nausea, vomiting or diarrhea  MR abdomen 8/15/23 "LIVER: Hepatosplenomegaly with diffuse metastatic nodules throughout both lobes measuring up to 2.8 x 2.6 cm in the right inferior lobe (11-36)."

## 2023-08-25 NOTE — CONSULT LETTER
[Dear  ___] : Dear  [unfilled], [Consult Letter:] : I had the pleasure of evaluating your patient, [unfilled]. [Please see my note below.] : Please see my note below. [Consult Closing:] : Thank you very much for allowing me to participate in the care of this patient.  If you have any questions, please do not hesitate to contact me. [Sincerely,] : Sincerely, [FreeTextEntry2] : Dr. Guerin

## 2023-08-25 NOTE — REASON FOR VISIT
[Consultation] : a consultation visit [Home] : at home, [unfilled] , at the time of the visit. [Medical Office: ([facility])___] : at the medical office located in  [Spouse] : spouse [Patient] : the patient [Self] : self [Family Member] : family member [FreeTextEntry1] : Liver Mass biopsy

## 2023-08-26 LAB
ALBUMIN SERPL ELPH-MCNC: 4.4 G/DL
ALP BLD-CCNC: 560 U/L
ALT SERPL-CCNC: 135 U/L
ANION GAP SERPL CALC-SCNC: 19 MMOL/L
APTT BLD: 33.4 SEC
AST SERPL-CCNC: 491 U/L
BILIRUB SERPL-MCNC: 2.7 MG/DL
BUN SERPL-MCNC: 36 MG/DL
CALCIUM SERPL-MCNC: 11.1 MG/DL
CHLORIDE SERPL-SCNC: 91 MMOL/L
CO2 SERPL-SCNC: 20 MMOL/L
CREAT SERPL-MCNC: 1.75 MG/DL
EGFR: 38 ML/MIN/1.73M2
GLUCOSE SERPL-MCNC: 80 MG/DL
INR PPP: 1.07 RATIO
POTASSIUM SERPL-SCNC: 5.7 MMOL/L
PROT SERPL-MCNC: 7.7 G/DL
PT BLD: 12.1 SEC
SODIUM SERPL-SCNC: 130 MMOL/L

## 2023-08-29 ENCOUNTER — RESULT REVIEW (OUTPATIENT)
Age: 83
End: 2023-08-29

## 2023-08-29 ENCOUNTER — INPATIENT (INPATIENT)
Facility: HOSPITAL | Age: 83
LOS: 15 days | Discharge: NURSING, REHAB OR LONG-TERM CARE | End: 2023-09-14
Attending: STUDENT IN AN ORGANIZED HEALTH CARE EDUCATION/TRAINING PROGRAM | Admitting: STUDENT IN AN ORGANIZED HEALTH CARE EDUCATION/TRAINING PROGRAM
Payer: MEDICARE

## 2023-08-29 VITALS
TEMPERATURE: 97 F | SYSTOLIC BLOOD PRESSURE: 128 MMHG | RESPIRATION RATE: 18 BRPM | DIASTOLIC BLOOD PRESSURE: 81 MMHG | OXYGEN SATURATION: 98 % | HEIGHT: 72 IN | HEART RATE: 68 BPM | WEIGHT: 106.7 LBS

## 2023-08-29 DIAGNOSIS — R62.7 ADULT FAILURE TO THRIVE: ICD-10-CM

## 2023-08-29 DIAGNOSIS — N17.9 ACUTE KIDNEY FAILURE, UNSPECIFIED: ICD-10-CM

## 2023-08-29 DIAGNOSIS — I10 ESSENTIAL (PRIMARY) HYPERTENSION: ICD-10-CM

## 2023-08-29 DIAGNOSIS — Z29.9 ENCOUNTER FOR PROPHYLACTIC MEASURES, UNSPECIFIED: ICD-10-CM

## 2023-08-29 DIAGNOSIS — Z98.890 OTHER SPECIFIED POSTPROCEDURAL STATES: ICD-10-CM

## 2023-08-29 DIAGNOSIS — Z98.890 OTHER SPECIFIED POSTPROCEDURAL STATES: Chronic | ICD-10-CM

## 2023-08-29 DIAGNOSIS — R16.0 HEPATOMEGALY, NOT ELSEWHERE CLASSIFIED: ICD-10-CM

## 2023-08-29 DIAGNOSIS — E87.5 HYPERKALEMIA: ICD-10-CM

## 2023-08-29 LAB
ALBUMIN SERPL ELPH-MCNC: 3.8 G/DL — SIGNIFICANT CHANGE UP (ref 3.3–5)
ALP SERPL-CCNC: 620 U/L — HIGH (ref 40–120)
ALT FLD-CCNC: 122 U/L — HIGH (ref 4–41)
ANION GAP SERPL CALC-SCNC: 16 MMOL/L — HIGH (ref 7–14)
ANION GAP SERPL CALC-SCNC: 17 MMOL/L — HIGH (ref 7–14)
AST SERPL-CCNC: 402 U/L — HIGH (ref 4–40)
BILIRUB SERPL-MCNC: 3.4 MG/DL — HIGH (ref 0.2–1.2)
BUN SERPL-MCNC: 47 MG/DL — HIGH (ref 7–23)
BUN SERPL-MCNC: 49 MG/DL — HIGH (ref 7–23)
CALCIUM SERPL-MCNC: 10.5 MG/DL — SIGNIFICANT CHANGE UP (ref 8.4–10.5)
CALCIUM SERPL-MCNC: 11 MG/DL — HIGH (ref 8.4–10.5)
CHLORIDE SERPL-SCNC: 91 MMOL/L — LOW (ref 98–107)
CHLORIDE SERPL-SCNC: 93 MMOL/L — LOW (ref 98–107)
CO2 SERPL-SCNC: 23 MMOL/L — SIGNIFICANT CHANGE UP (ref 22–31)
CO2 SERPL-SCNC: 24 MMOL/L — SIGNIFICANT CHANGE UP (ref 22–31)
CREAT SERPL-MCNC: 1.8 MG/DL — HIGH (ref 0.5–1.3)
CREAT SERPL-MCNC: 1.95 MG/DL — HIGH (ref 0.5–1.3)
EGFR: 34 ML/MIN/1.73M2 — LOW
EGFR: 37 ML/MIN/1.73M2 — LOW
GLUCOSE SERPL-MCNC: 84 MG/DL — SIGNIFICANT CHANGE UP (ref 70–99)
GLUCOSE SERPL-MCNC: 89 MG/DL — SIGNIFICANT CHANGE UP (ref 70–99)
HCT VFR BLD CALC: 36.6 % — LOW (ref 39–50)
HGB BLD-MCNC: 12.4 G/DL — LOW (ref 13–17)
LIDOCAIN IGE QN: 83 U/L — HIGH (ref 7–60)
LIDOCAIN IGE QN: 84 U/L — HIGH (ref 7–60)
MAGNESIUM SERPL-MCNC: 2.2 MG/DL — SIGNIFICANT CHANGE UP (ref 1.6–2.6)
MCHC RBC-ENTMCNC: 31.9 PG — SIGNIFICANT CHANGE UP (ref 27–34)
MCHC RBC-ENTMCNC: 33.9 GM/DL — SIGNIFICANT CHANGE UP (ref 32–36)
MCV RBC AUTO: 94.1 FL — SIGNIFICANT CHANGE UP (ref 80–100)
NRBC # BLD: 0 /100 WBCS — SIGNIFICANT CHANGE UP (ref 0–0)
NRBC # FLD: 0 K/UL — SIGNIFICANT CHANGE UP (ref 0–0)
PHOSPHATE SERPL-MCNC: 3.1 MG/DL — SIGNIFICANT CHANGE UP (ref 2.5–4.5)
PLATELET # BLD AUTO: 196 K/UL — SIGNIFICANT CHANGE UP (ref 150–400)
POTASSIUM SERPL-MCNC: 5.3 MMOL/L — SIGNIFICANT CHANGE UP (ref 3.5–5.3)
POTASSIUM SERPL-MCNC: 5.7 MMOL/L — HIGH (ref 3.5–5.3)
POTASSIUM SERPL-SCNC: 5.3 MMOL/L — SIGNIFICANT CHANGE UP (ref 3.5–5.3)
POTASSIUM SERPL-SCNC: 5.7 MMOL/L — HIGH (ref 3.5–5.3)
PROT SERPL-MCNC: 7.1 G/DL — SIGNIFICANT CHANGE UP (ref 6–8.3)
RBC # BLD: 3.89 M/UL — LOW (ref 4.2–5.8)
RBC # FLD: 13.4 % — SIGNIFICANT CHANGE UP (ref 10.3–14.5)
SODIUM SERPL-SCNC: 131 MMOL/L — LOW (ref 135–145)
SODIUM SERPL-SCNC: 133 MMOL/L — LOW (ref 135–145)
WBC # BLD: 10.25 K/UL — SIGNIFICANT CHANGE UP (ref 3.8–10.5)
WBC # FLD AUTO: 10.25 K/UL — SIGNIFICANT CHANGE UP (ref 3.8–10.5)

## 2023-08-29 PROCEDURE — 99222 1ST HOSP IP/OBS MODERATE 55: CPT | Mod: GC

## 2023-08-29 PROCEDURE — 88341 IMHCHEM/IMCYTCHM EA ADD ANTB: CPT | Mod: 26

## 2023-08-29 PROCEDURE — 74176 CT ABD & PELVIS W/O CONTRAST: CPT | Mod: 26

## 2023-08-29 PROCEDURE — 77012 CT SCAN FOR NEEDLE BIOPSY: CPT | Mod: 26

## 2023-08-29 PROCEDURE — 99223 1ST HOSP IP/OBS HIGH 75: CPT | Mod: GC

## 2023-08-29 PROCEDURE — 47000 NEEDLE BIOPSY OF LIVER PERQ: CPT

## 2023-08-29 PROCEDURE — 88342 IMHCHEM/IMCYTCHM 1ST ANTB: CPT | Mod: 26

## 2023-08-29 PROCEDURE — 88307 TISSUE EXAM BY PATHOLOGIST: CPT | Mod: 26

## 2023-08-29 PROCEDURE — 88333 PATH CONSLTJ SURG CYTO XM 1: CPT | Mod: 26

## 2023-08-29 PROCEDURE — 93010 ELECTROCARDIOGRAM REPORT: CPT

## 2023-08-29 RX ORDER — SODIUM CHLORIDE 9 MG/ML
1000 INJECTION, SOLUTION INTRAVENOUS
Refills: 0 | Status: DISCONTINUED | OUTPATIENT
Start: 2023-08-29 | End: 2023-08-30

## 2023-08-29 RX ORDER — HEPARIN SODIUM 5000 [USP'U]/ML
5000 INJECTION INTRAVENOUS; SUBCUTANEOUS EVERY 8 HOURS
Refills: 0 | Status: DISCONTINUED | OUTPATIENT
Start: 2023-08-29 | End: 2023-08-29

## 2023-08-29 RX ORDER — LUTEIN 20 MG
1 CAPSULE ORAL
Qty: 0 | Refills: 0 | DISCHARGE

## 2023-08-29 RX ORDER — ACETAMINOPHEN 500 MG
650 TABLET ORAL EVERY 6 HOURS
Refills: 0 | Status: DISCONTINUED | OUTPATIENT
Start: 2023-08-29 | End: 2023-09-14

## 2023-08-29 RX ORDER — LISINOPRIL 2.5 MG/1
1 TABLET ORAL
Qty: 0 | Refills: 0 | DISCHARGE

## 2023-08-29 RX ORDER — ENOXAPARIN SODIUM 100 MG/ML
30 INJECTION SUBCUTANEOUS EVERY 24 HOURS
Refills: 0 | Status: DISCONTINUED | OUTPATIENT
Start: 2023-08-29 | End: 2023-08-29

## 2023-08-29 RX ORDER — HEPARIN SODIUM 5000 [USP'U]/ML
5000 INJECTION INTRAVENOUS; SUBCUTANEOUS EVERY 12 HOURS
Refills: 0 | Status: DISCONTINUED | OUTPATIENT
Start: 2023-08-29 | End: 2023-09-14

## 2023-08-29 RX ADMIN — Medication 650 MILLIGRAM(S): at 22:36

## 2023-08-29 RX ADMIN — Medication 650 MILLIGRAM(S): at 21:36

## 2023-08-29 RX ADMIN — SODIUM CHLORIDE 75 MILLILITER(S): 9 INJECTION, SOLUTION INTRAVENOUS at 21:32

## 2023-08-29 NOTE — H&P ADULT - NSHPPHYSICALEXAM_GEN_ALL_CORE
T(C): --  HR: --  BP: --  RR: --  SpO2: --    CONSTITUTIONAL: Well groomed, no apparent distress  EYES: PERRLA and symmetric, EOMI, No conjunctival or scleral injection, non-icteric  ENMT: Oral mucosa with moist membranes. Normal dentition; no pharyngeal injection or exudates             NECK: Supple, symmetric and without tracheal deviation   RESP: No respiratory distress, no use of accessory muscles; CTA b/l, no WRR  CV: RRR, +S1S2, no MRG; no JVD; no peripheral edema  GI: Soft, NT, ND, no rebound, no guarding; no palpable masses; no hepatosplenomegaly; no hernia palpated  LYMPH: No cervical LAD or tenderness; no axillary LAD or tenderness; no inguinal LAD or tenderness  MSK: Normal gait; No digital clubbing or cyanosis; examination of the (head/neck/spine/ribs/pelvis, RUE, LUE, RLE, LLE) without misalignment,            Normal ROM without pain, no spinal tenderness, normal muscle strength/tone  SKIN: No rashes or ulcers noted; no subcutaneous nodules or induration palpable  NEURO: CN II-XII intact; normal reflexes in upper and lower extremities, sensation intact in upper and lower extremities b/l to light touch   PSYCH: Appropriate insight/judgment; A+O x 3, mood and affect appropriate, recent/remote memory intact Vital Signs Last 24 Hrs  T(C): 36.2 (29 Aug 2023 16:49), Max: 36.2 (29 Aug 2023 16:49)  T(F): 97.2 (29 Aug 2023 16:49), Max: 97.2 (29 Aug 2023 16:49)  HR: 68 (29 Aug 2023 16:49) (68 - 68)  BP: 128/81 (29 Aug 2023 16:49) (128/81 - 128/81)  RR: 18 (29 Aug 2023 16:49) (18 - 18)  SpO2: 98% (29 Aug 2023 16:49) (98% - 98%)    Parameters below as of 29 Aug 2023 16:49  Patient On (Oxygen Delivery Method): room air    CONSTITUTIONAL: Extremely frail, temporal wasting   EYES: PERRLA and symmetric, EOMI, No conjunctival or scleral injection, non-icteric  ENMT: Oral mucosa with moist membranes. Normal dentition; no pharyngeal injection or exudates  NECK: Supple, symmetric and without tracheal deviation   RESP: No respiratory distress, no use of accessory muscles; CTA b/l, no WRR  CV: RRR, +S1S2, no MRG; no JVD; no peripheral edema  GI: Mild-moderate TTP in lower abdomen. Soft, ND, no rebound, no guarding; no palpable masses  LYMPH: No cervical LAD or tenderness; no axillary LAD or tenderness; no inguinal LAD or tenderness  MSK: Unable to assess gait;  Normal ROM without pain, no spinal tenderness, normal muscle strength/tone in all 4 extremities.   SKIN: No rashes or ulcers noted; no subcutaneous nodules or induration palpable  NEURO: CN II-XII intact; normal reflexes sensation intact in upper and lower extremities b/l to light touch   PSYCH: Appropriate insight/judgment; A+O x 3, mood and affect appropriate, recent/remote memory intact

## 2023-08-29 NOTE — H&P ADULT - NSHPSOCIALHISTORY_GEN_ALL_CORE
Pt , lives with wife Sunshine. Pt used to drink alcohol socially, stopped 2-3 months ago because he "lost the taste for it." No smoking cigarettes, no use of any recreational drugs.

## 2023-08-29 NOTE — H&P ADULT - PROBLEM SELECTOR PLAN 1
- ISO newly found metastatic cancer to liver, lung, bone  - Severe generalized weakness, pt was scared to go home following IR biopsy today 8/29. Minimal appetite, fatigue, unintentional weight loss  - Repeat CTAP  - Lipase and CBC stat; pt with abdominal pain radiating to back, along with weakness   - Will consult H/O in AM for recs

## 2023-08-29 NOTE — H&P ADULT - ATTENDING COMMENTS
pt with decreased po intake likely due to malignancy - likely leading to mild prerenal greg and electrolyte abnormalities.  Check UA.  pt complaining of early satiety and some increased coughing with water - would check CT a/p and swallow eval.

## 2023-08-29 NOTE — H&P ADULT - NSHPLABSRESULTS_GEN_ALL_CORE
08-29    131<L>  |  91<L>  |  47<H>  ----------------------------<  89  5.7<H>   |  24  |  1.95<H>    Ca    11.0<H>      29 Aug 2023 07:45                Urinalysis Basic - ( 29 Aug 2023 07:45 )    Color: x / Appearance: x / SG: x / pH: x  Gluc: 89 mg/dL / Ketone: x  / Bili: x / Urobili: x   Blood: x / Protein: x / Nitrite: x   Leuk Esterase: x / RBC: x / WBC x   Sq Epi: x / Non Sq Epi: x / Bacteria: x            Lactate Trend            CAPILLARY BLOOD GLUCOSE 08-29    131<L>  |  91<L>  |  47<H>  ----------------------------<  89  5.7<H>   |  24  |  1.95<H>    Ca    11.0<H>      29 Aug 2023 07:45    Urinalysis Basic - ( 29 Aug 2023 07:45 )    Color: x / Appearance: x / SG: x / pH: x  Gluc: 89 mg/dL / Ketone: x  / Bili: x / Urobili: x   Blood: x / Protein: x / Nitrite: x   Leuk Esterase: x / RBC: x / WBC x   Sq Epi: x / Non Sq Epi: x / Bacteria: x    < from: CT Abdomen and Pelvis No Cont (08.08.23 @ 10:52) >      IMPRESSION:    Limited noncontrast exam.    Few small, less than 6 mm left pulmonary nodules.    Diffuse heterogeneous low attenuation nodularity throughout the liver is   suspicious for metastatic disease.    Prostatomegaly.    No bowel obstruction.    < end of copied text >            Lactate Trend            CAPILLARY BLOOD GLUCOSE 08-29    131<L>  |  91<L>  |  47<H>  ----------------------------<  89  5.7<H>   |  24  |  1.95<H>    Ca    11.0<H>      29 Aug 2023 07:45    Urinalysis Basic - ( 29 Aug 2023 07:45 )    Color: x / Appearance: x / SG: x / pH: x  Gluc: 89 mg/dL / Ketone: x  / Bili: x / Urobili: x   Blood: x / Protein: x / Nitrite: x   Leuk Esterase: x / RBC: x / WBC x   Sq Epi: x / Non Sq Epi: x / Bacteria: x    < from: CT Abdomen and Pelvis No Cont (08.08.23 @ 10:52) >      IMPRESSION:    Limited noncontrast exam.    Few small, less than 6 mm left pulmonary nodules.    Diffuse heterogeneous low attenuation nodularity throughout the liver is   suspicious for metastatic disease.    Prostatomegaly.    No bowel obstruction.    < end of copied text >

## 2023-08-29 NOTE — H&P ADULT - PROBLEM SELECTOR PLAN 3
- BUN/Cr 47/1.95  - Pre-renal ISO poor po intake vs post-renal due to obstruction (less likely as pt reports voiding well on own, but will assess on CTAP)  - Avoid nephrotoxins, renally dose meds - BUN/Cr 47/1.95  - Pre-renal ISO poor po intake vs post-renal due to obstruction (less likely as pt reports voiding well on own, but will assess on CTAP)  - LR maintenance  - Avoid nephrotoxins, renally dose meds

## 2023-08-29 NOTE — PATIENT PROFILE ADULT - FALL HARM RISK - HARM RISK INTERVENTIONS

## 2023-08-29 NOTE — H&P ADULT - PROBLEM SELECTOR PLAN 4
Diet: Regular diet + Ensure   DVT ppx: SubQ heparin   Code Status: Extensive GOC discussion with pt at bedside. Says he has a lot to live for including family, grandkids and wants "everything done to keep him alive" including CPR, intubation, and pressors.   Dispo: Pending clinical course - Pt normotensive 8/29  - Hold lisinopril 5 mg ISO GREG

## 2023-08-29 NOTE — PATIENT PROFILE ADULT - FUNCTIONAL ASSESSMENT - DAILY ACTIVITY 4.
Jacob pt-  Pharmacy requesting refill     Pt has not seen Dr James yet  Last filled 03/14/2019    Ok to fill?  Please advise   
3 = A little assistance

## 2023-08-29 NOTE — H&P ADULT - PROBLEM SELECTOR PLAN 5
Diet: Regular diet + Ensure   DVT ppx: SubQ heparin   Code Status: Extensive GOC discussion with pt at bedside. Says he has a lot to live for including family, grandkids and wants "everything done to keep him alive" including CPR, intubation, and pressors.   Dispo: Pending clinical course

## 2023-08-29 NOTE — H&P ADULT - ASSESSMENT
Pt is an 83 y.o. M w/ PMHx HTN, BPH, recent findings of metastatic cancer to lung, liver, and pelvic bone w/ unclear primary source at this point, present with severe generalized weakness ISO electrolyte derangements including hyponatremia and hyperkalemia along w/ GREG, most likely due to pre-rental cause secondary to poor po intake.

## 2023-08-29 NOTE — H&P ADULT - PROBLEM SELECTOR PLAN 2
Diet:  Psych/Sleep:  GI:  DVT ppx:  Code Status:  Dispo: - K 5.7 on outpatient labs 8/29  - Repeat BMP pending  - Consider lokelma if still elevated   - Continuous telemetry

## 2023-08-29 NOTE — H&P ADULT - NSHPREVIEWOFSYSTEMS_GEN_ALL_CORE
REVIEW OF SYSTEMS:  CONSTITUTIONAL: No weakness, fevers or chills  EYES/ENT: No visual changes;  No vertigo or throat pain   NECK: No pain or stiffness  RESPIRATORY: No cough, wheezing, hemoptysis; No shortness of breath  CARDIOVASCULAR: No chest pain or palpitations  GASTROINTESTINAL: No abdominal or epigastric pain. No nausea, vomiting, or hematemesis; No diarrhea or constipation. No melena or hematochezia.  GENITOURINARY: No dysuria, frequency or hematuria  NEUROLOGICAL: No numbness or weakness  SKIN: No itching, rashes REVIEW OF SYSTEMS:  CONSTITUTIONAL: +Weakness, no fever or chills   EYES/ENT: No visual changes;  No vertigo or throat pain   NECK: No pain or stiffness  RESPIRATORY: No cough, wheezing, hemoptysis; No shortness of breath  CARDIOVASCULAR: No chest pain or palpitations  GASTROINTESTINAL: +Abdominal pain radiating to the back. No nausea, vomiting, or hematemesis; No diarrhea or constipation. No melena or hematochezia.  GENITOURINARY: No dysuria, frequency or hematuria  NEUROLOGICAL: No numbness or weakness  SKIN: No itching, rashes

## 2023-08-30 DIAGNOSIS — E87.1 HYPO-OSMOLALITY AND HYPONATREMIA: ICD-10-CM

## 2023-08-30 DIAGNOSIS — R16.0 HEPATOMEGALY, NOT ELSEWHERE CLASSIFIED: ICD-10-CM

## 2023-08-30 DIAGNOSIS — K76.9 LIVER DISEASE, UNSPECIFIED: ICD-10-CM

## 2023-08-30 LAB
ALBUMIN SERPL ELPH-MCNC: 3.7 G/DL — SIGNIFICANT CHANGE UP (ref 3.3–5)
ALBUMIN SERPL ELPH-MCNC: 3.8 G/DL — SIGNIFICANT CHANGE UP (ref 3.3–5)
ALP SERPL-CCNC: 593 U/L — HIGH (ref 40–120)
ALP SERPL-CCNC: 648 U/L — HIGH (ref 40–120)
ALT FLD-CCNC: 117 U/L — HIGH (ref 4–41)
ALT FLD-CCNC: 124 U/L — HIGH (ref 4–41)
ANION GAP SERPL CALC-SCNC: 12 MMOL/L — SIGNIFICANT CHANGE UP (ref 7–14)
APPEARANCE UR: ABNORMAL
APTT BLD: 31.8 SEC — SIGNIFICANT CHANGE UP (ref 24.5–35.6)
AST SERPL-CCNC: 389 U/L — HIGH (ref 4–40)
AST SERPL-CCNC: 431 U/L — HIGH (ref 4–40)
BACTERIA # UR AUTO: NEGATIVE /HPF — SIGNIFICANT CHANGE UP
BILIRUB DIRECT SERPL-MCNC: 2.8 MG/DL — HIGH (ref 0–0.3)
BILIRUB INDIRECT FLD-MCNC: 0.8 MG/DL — SIGNIFICANT CHANGE UP (ref 0–1)
BILIRUB SERPL-MCNC: 3.4 MG/DL — HIGH (ref 0.2–1.2)
BILIRUB SERPL-MCNC: 3.6 MG/DL — HIGH (ref 0.2–1.2)
BILIRUB UR-MCNC: ABNORMAL
BUN SERPL-MCNC: 49 MG/DL — HIGH (ref 7–23)
CALCIUM SERPL-MCNC: 10.5 MG/DL — SIGNIFICANT CHANGE UP (ref 8.4–10.5)
CAST: 0 /LPF — SIGNIFICANT CHANGE UP (ref 0–4)
CEA SERPL-MCNC: 164 NG/ML — HIGH (ref 1–3.8)
CHLORIDE SERPL-SCNC: 93 MMOL/L — LOW (ref 98–107)
CO2 SERPL-SCNC: 24 MMOL/L — SIGNIFICANT CHANGE UP (ref 22–31)
COLOR SPEC: SIGNIFICANT CHANGE UP
COMMENT - URINE: SIGNIFICANT CHANGE UP
CREAT SERPL-MCNC: 1.81 MG/DL — HIGH (ref 0.5–1.3)
D DIMER BLD IA.RAPID-MCNC: 5418 NG/ML DDU — HIGH
DIFF PNL FLD: NEGATIVE — SIGNIFICANT CHANGE UP
EGFR: 37 ML/MIN/1.73M2 — LOW
GLUCOSE SERPL-MCNC: 76 MG/DL — SIGNIFICANT CHANGE UP (ref 70–99)
GLUCOSE UR QL: NEGATIVE MG/DL — SIGNIFICANT CHANGE UP
INR BLD: 1.15 RATIO — SIGNIFICANT CHANGE UP (ref 0.85–1.18)
KETONES UR-MCNC: ABNORMAL MG/DL
LACTATE SERPL-SCNC: 1.8 MMOL/L — SIGNIFICANT CHANGE UP (ref 0.5–2)
LEUKOCYTE ESTERASE UR-ACNC: NEGATIVE — SIGNIFICANT CHANGE UP
MAGNESIUM SERPL-MCNC: 2.2 MG/DL — SIGNIFICANT CHANGE UP (ref 1.6–2.6)
NITRITE UR-MCNC: NEGATIVE — SIGNIFICANT CHANGE UP
PH UR: 5 — SIGNIFICANT CHANGE UP (ref 5–8)
PHOSPHATE SERPL-MCNC: 2.9 MG/DL — SIGNIFICANT CHANGE UP (ref 2.5–4.5)
POTASSIUM SERPL-MCNC: 5.1 MMOL/L — SIGNIFICANT CHANGE UP (ref 3.5–5.3)
POTASSIUM SERPL-SCNC: 5.1 MMOL/L — SIGNIFICANT CHANGE UP (ref 3.5–5.3)
PROT SERPL-MCNC: 6.9 G/DL — SIGNIFICANT CHANGE UP (ref 6–8.3)
PROT SERPL-MCNC: 6.9 G/DL — SIGNIFICANT CHANGE UP (ref 6–8.3)
PROT UR-MCNC: 100 MG/DL
PROTHROM AB SERPL-ACNC: 12.9 SEC — SIGNIFICANT CHANGE UP (ref 9.5–13)
RBC CASTS # UR COMP ASSIST: 5 /HPF — HIGH (ref 0–4)
SODIUM SERPL-SCNC: 129 MMOL/L — LOW (ref 135–145)
SP GR SPEC: 1.02 — SIGNIFICANT CHANGE UP (ref 1–1.03)
SQUAMOUS # UR AUTO: 3 /HPF — SIGNIFICANT CHANGE UP (ref 0–5)
URATE SERPL-MCNC: 12.9 MG/DL — HIGH (ref 3.4–8.8)
UROBILINOGEN FLD QL: 1 MG/DL — SIGNIFICANT CHANGE UP (ref 0.2–1)
WBC UR QL: 4 /HPF — SIGNIFICANT CHANGE UP (ref 0–5)

## 2023-08-30 PROCEDURE — 99232 SBSQ HOSP IP/OBS MODERATE 35: CPT | Mod: GC

## 2023-08-30 PROCEDURE — 99223 1ST HOSP IP/OBS HIGH 75: CPT | Mod: GC

## 2023-08-30 PROCEDURE — 99233 SBSQ HOSP IP/OBS HIGH 50: CPT | Mod: GC

## 2023-08-30 RX ORDER — SENNA PLUS 8.6 MG/1
2 TABLET ORAL AT BEDTIME
Refills: 0 | Status: DISCONTINUED | OUTPATIENT
Start: 2023-08-30 | End: 2023-09-14

## 2023-08-30 RX ORDER — POLYETHYLENE GLYCOL 3350 17 G/17G
17 POWDER, FOR SOLUTION ORAL DAILY
Refills: 0 | Status: DISCONTINUED | OUTPATIENT
Start: 2023-08-30 | End: 2023-09-14

## 2023-08-30 RX ADMIN — Medication 650 MILLIGRAM(S): at 15:53

## 2023-08-30 RX ADMIN — Medication 650 MILLIGRAM(S): at 16:20

## 2023-08-30 RX ADMIN — HEPARIN SODIUM 5000 UNIT(S): 5000 INJECTION INTRAVENOUS; SUBCUTANEOUS at 05:41

## 2023-08-30 NOTE — PROGRESS NOTE ADULT - PROBLEM SELECTOR PLAN 1
- ISO newly found metastatic cancer to liver, lung, bone  - Severe generalized weakness, pt was scared to go home following IR biopsy today 8/29. Minimal appetite, fatigue, unintentional weight loss  - CTAP w/   - Lipase and CBC stat; pt with abdominal pain radiating to back, along with weakness   - H/O consulted, recs appreciated - ISO newly found metastatic cancer to liver, lung, bone  - Severe generalized weakness, pt was scared to go home following IR biopsy 8/29, sent straight to [facility]. Minimal appetite, fatigue, unintentional weight loss  - Speech + swallow eval; rec regular diet and thin liquids, cinesophagram for choking sensation  - PT eval

## 2023-08-30 NOTE — PROGRESS NOTE ADULT - SUBJECTIVE AND OBJECTIVE BOX
ANESTHESIA POSTOP CHECK    83y Male POSTOP DAY 1 S/P Liver Biopsy  K improved  Vital Signs Last 24 Hrs  T(C): 36.2 (30 Aug 2023 12:27), Max: 36.6 (29 Aug 2023 21:32)  T(F): 97.2 (30 Aug 2023 12:27), Max: 97.9 (29 Aug 2023 21:32)  HR: 62 (30 Aug 2023 12:27) (61 - 62)  BP: 117/65 (30 Aug 2023 12:27) (115/69 - 123/69)  BP(mean): --  RR: 17 (30 Aug 2023 12:27) (17 - 18)  SpO2: 99% (30 Aug 2023 12:27) (99% - 100%)    Parameters below as of 30 Aug 2023 12:27  Patient On (Oxygen Delivery Method): room air      I&O's Summary      [x ] NO APPARENT ANESTHESIA COMPLICATIONS      Comments:

## 2023-08-30 NOTE — PROGRESS NOTE ADULT - SUBJECTIVE AND OBJECTIVE BOX
Malka Akers MD  PGY1  Preferred contact via [employer] Teams      Patient is a 83y old  Male who presents with a chief complaint of Hyperkalemia, GREG (29 Aug 2023 15:51)      SUBJECTIVE / OVERNIGHT EVENTS:    MEDICATIONS  (STANDING):  heparin   Injectable 5000 Unit(s) SubCutaneous every 12 hours  lactated ringers. 1000 milliLiter(s) (75 mL/Hr) IV Continuous <Continuous>    MEDICATIONS  (PRN):  acetaminophen     Tablet .. 650 milliGRAM(s) Oral every 6 hours PRN Moderate Pain (4 - 6)    PHYSICAL EXAM:    Vital Signs Last 24 Hrs  T(C): 36.3 (30 Aug 2023 05:37), Max: 36.6 (29 Aug 2023 21:32)  T(F): 97.4 (30 Aug 2023 05:37), Max: 97.9 (29 Aug 2023 21:32)  HR: 61 (30 Aug 2023 05:37) (61 - 68)  BP: 123/69 (30 Aug 2023 05:37) (115/69 - 128/81)  RR: 17 (30 Aug 2023 05:37) (17 - 18)  SpO2: 99% (30 Aug 2023 05:37) (98% - 100%)    Parameters below as of 30 Aug 2023 05:37  Patient On (Oxygen Delivery Method): room air    VITALS:   T(C): 36.3 (23 @ 05:37), Max: 36.6 (23 @ 21:32)  HR: 61 (23 @ 05:37) (61 - 68)  BP: 123/69 (23 @ 05:37) (115/69 - 128/81)  RR: 17 (23 @ 05:37) (17 - 18)  SpO2: 99% (23 @ 05:37) (98% - 100%)    GENERAL: Extremely frail, resting in bed comfortably   HEAD:  Atraumatic, normocephalic, Temporal wasting  EYES: EOMI, PERRLA, conjunctiva and sclera clear  ENT: Moist mucous membranes  NECK: Supple, no JVD  HEART: Regular rate and rhythm, no murmurs, rubs, or gallops  LUNGS: Unlabored respirations.  Clear to auscultation bilaterally, no crackles, wheezing, or rhonchi  ABDOMEN: Mild-moderate TTP in lower abdomen. Soft, ND, no rebound, no guarding; no palpable masses  EXTREMITIES: 2+ peripheral pulses bilaterally. No clubbing, cyanosis, or edema  NERVOUS SYSTEM:  A&Ox3, no focal deficits   SKIN: No rashes or lesions    LABS:                        12.4   10.25 )-----------( 196      ( 29 Aug 2023 21:25 )             36.6          133<L>  |  93<L>  |  49<H>  ----------------------------<  84  5.3   |  23  |  1.80<H>    Ca    10.5      29 Aug 2023 21:25  Phos  3.1       Mg     2.20         TPro  7.1  /  Alb  3.8  /  TBili  3.4<H>  /  DBili  x   /  AST  402<H>  /  ALT  122<H>  /  AlkPhos  620<H>      Lipase (23 @ 21:25)   Lipase: 83 U/L      Urinalysis Basic - ( 30 Aug 2023 02:55 )    Color: Dark Yellow / Appearance: Cloudy / S.022 / pH: x  Gluc: x / Ketone: Trace mg/dL  / Bili: Small / Urobili: 1.0 mg/dL   Blood: x / Protein: 100 mg/dL / Nitrite: Negative   Leuk Esterase: Negative / RBC: 5 /HPF / WBC 4 /HPF   Sq Epi: x / Non Sq Epi: 3 /HPF / Bacteria: Negative /HPF        RADIOLOGY & ADDITIONAL TESTS:    < from: CT Abdomen and Pelvis No Cont (23 @ 10:52) >    IMPRESSION:    Limited noncontrast exam.    Few small, less than 6 mm left pulmonary nodules.    Diffuse heterogeneous low attenuation nodularity throughout the liver is   suspicious for metastatic disease.    Prostatomegaly.    No bowel obstruction.    < end of copied text >     Malka Akers MD  PGY1  Preferred contact via [employer] Teams      Patient is a 83y old  Male who presents with a chief complaint of Hyperkalemia, GREG (29 Aug 2023 15:51)      SUBJECTIVE / OVERNIGHT EVENTS:    MEDICATIONS  (STANDING):  heparin   Injectable 5000 Unit(s) SubCutaneous every 12 hours  lactated ringers. 1000 milliLiter(s) (75 mL/Hr) IV Continuous <Continuous>    MEDICATIONS  (PRN):  acetaminophen     Tablet .. 650 milliGRAM(s) Oral every 6 hours PRN Moderate Pain (4 - 6)    PHYSICAL EXAM:    Vital Signs Last 24 Hrs  T(C): 36.3 (30 Aug 2023 05:37), Max: 36.6 (29 Aug 2023 21:32)  T(F): 97.4 (30 Aug 2023 05:37), Max: 97.9 (29 Aug 2023 21:32)  HR: 61 (30 Aug 2023 05:37) (61 - 68)  BP: 123/69 (30 Aug 2023 05:37) (115/69 - 128/81)  RR: 17 (30 Aug 2023 05:37) (17 - 18)  SpO2: 99% (30 Aug 2023 05:37) (98% - 100%)    Parameters below as of 30 Aug 2023 05:37  Patient On (Oxygen Delivery Method): room air    GENERAL: Extremely frail, resting in bed comfortably   HEAD:  Atraumatic, normocephalic, Temporal wasting  EYES: EOMI, PERRLA, conjunctiva and sclera clear  ENT: Moist mucous membranes  NECK: Supple, no JVD  HEART: Regular rate and rhythm, no murmurs, rubs, or gallops  LUNGS: Unlabored respirations.  Clear to auscultation bilaterally, no crackles, wheezing, or rhonchi  ABDOMEN: No tenderness to palpation in all 4 quadrants. Soft, ND, no rebound, no guarding; no palpable masses  EXTREMITIES: 2+ peripheral pulses bilaterally. No clubbing, cyanosis, or edema  NERVOUS SYSTEM:  A&Ox3, no focal deficits   SKIN: No rashes or lesions    LABS:                        12.4   10.25 )-----------( 196      ( 29 Aug 2023 21:25 )             36.6     08-30    129<L>  |  93<L>  |  49<H>  ----------------------------<  76  5.1   |  24  |  1.81<H>    Ca    10.5      30 Aug 2023 06:00  Phos  2.9       Mg     2.20         TPro  6.9  /  Alb  3.7  /  TBili  3.4<H>  /  DBili  x   /  AST  389<H>  /  ALT  117<H>  /  AlkPhos  593<H>      Lipase (23 @ 21:25)   Lipase: 83 U/L    Lactate, Blood (23 @ 06:00)   Lactate, Blood: 1.8 mmol/L    Urinalysis Basic - ( 30 Aug 2023 02:55 )    Color: Dark Yellow / Appearance: Cloudy / S.022 / pH: x  Gluc: x / Ketone: Trace mg/dL  / Bili: Small / Urobili: 1.0 mg/dL   Blood: x / Protein: 100 mg/dL / Nitrite: Negative   Leuk Esterase: Negative / RBC: 5 /HPF / WBC 4 /HPF   Sq Epi: x / Non Sq Epi: 3 /HPF / Bacteria: Negative /HPF        RADIOLOGY & ADDITIONAL TESTS:    < from: CT Abdomen and Pelvis No Cont (23 @ 22:38) >  IMPRESSION:  No hydronephrosis or stone.  Redemonstration of hepatomegaly with presumed innumerable hepatic   metastatic lesions.  Trace ascites and mild anasarca.    < end of copied text >       Malka Akers MD  PGY1  Preferred contact via [employer] Teams      Patient is a 83y old  Male who presents with a chief complaint of Hyperkalemia, GREG (29 Aug 2023 15:51)      SUBJECTIVE / OVERNIGHT EVENTS: Pt w/ moderate lower abd pain ON, improved with tylenol. Feeling well this morning, feels less weak compared to when he arrived at [facility] yesterday.     MEDICATIONS  (STANDING):  heparin   Injectable 5000 Unit(s) SubCutaneous every 12 hours  lactated ringers. 1000 milliLiter(s) (75 mL/Hr) IV Continuous <Continuous>    MEDICATIONS  (PRN):  acetaminophen     Tablet .. 650 milliGRAM(s) Oral every 6 hours PRN Moderate Pain (4 - 6)    PHYSICAL EXAM:    Vital Signs Last 24 Hrs  T(C): 36.3 (30 Aug 2023 05:37), Max: 36.6 (29 Aug 2023 21:32)  T(F): 97.4 (30 Aug 2023 05:37), Max: 97.9 (29 Aug 2023 21:32)  HR: 61 (30 Aug 2023 05:37) (61 - 68)  BP: 123/69 (30 Aug 2023 05:37) (115/69 - 128/81)  RR: 17 (30 Aug 2023 05:37) (17 - 18)  SpO2: 99% (30 Aug 2023 05:37) (98% - 100%)    Parameters below as of 30 Aug 2023 05:37  Patient On (Oxygen Delivery Method): room air    GENERAL: Extremely frail, resting in bed comfortably   HEAD:  Atraumatic, normocephalic, Temporal wasting  EYES: EOMI, PERRLA, conjunctiva and sclera clear  ENT: Moist mucous membranes  NECK: Supple, no JVD  HEART: Regular rate and rhythm, no murmurs, rubs, or gallops  LUNGS: Unlabored respirations.  Clear to auscultation bilaterally, no crackles, wheezing, or rhonchi  ABDOMEN: No tenderness to palpation in all 4 quadrants. Soft, ND, no rebound, no guarding; no palpable masses  EXTREMITIES: 2+ peripheral pulses bilaterally. No clubbing, cyanosis, or edema  NERVOUS SYSTEM:  A&Ox3, no focal deficits   SKIN: No rashes or lesions    LABS:                                   12.4   10.25 )-----------( 196      ( 29 Aug 2023 21:25 )             36.6           129<L>  |  93<L>  |  49<H>  ----------------------------<  76  5.1   |  24  |  1.81<H>    Ca    10.5      30 Aug 2023 06:00  Phos  2.9       Mg     2.20         TPro  6.9  /  Alb  3.7  /  TBili  3.4<H>  /  DBili  x   /  AST  389<H>  /  ALT  117<H>  /  AlkPhos  593<H>      Lipase (23 @ 21:25)   Lipase: 83 U/L    Lactate, Blood (23 @ 06:00)   Lactate, Blood: 1.8 mmol/L    Urinalysis Basic - ( 30 Aug 2023 02:55 )    Color: Dark Yellow / Appearance: Cloudy / S.022 / pH: x  Gluc: x / Ketone: Trace mg/dL  / Bili: Small / Urobili: 1.0 mg/dL   Blood: x / Protein: 100 mg/dL / Nitrite: Negative   Leuk Esterase: Negative / RBC: 5 /HPF / WBC 4 /HPF   Sq Epi: x / Non Sq Epi: 3 /HPF / Bacteria: Negative /HPF        RADIOLOGY & ADDITIONAL TESTS:    < from: CT Abdomen and Pelvis No Cont (23 @ 22:38) >  IMPRESSION:  No hydronephrosis or stone.  Redemonstration of hepatomegaly with presumed innumerable hepatic   metastatic lesions.  Trace ascites and mild anasarca.    < end of copied text >

## 2023-08-30 NOTE — SWALLOW BEDSIDE ASSESSMENT ADULT - ADDITIONAL RECOMMENDATIONS
1. This service to follow up as schedule permits for diet tolerance. 2. Reconsult if change in medical status.

## 2023-08-30 NOTE — PROGRESS NOTE ADULT - ATTENDING COMMENTS
Pt s/p liver biopsy by IR - follow up path - would obtain heme onc eval to guide further workup of primary.  d/c IVF - check urine studies for hyponatremia.  plan of care discussed with pt, spouse and son at bedside.

## 2023-08-30 NOTE — CONSULT NOTE ADULT - SUBJECTIVE AND OBJECTIVE BOX
HPI:  Mr. 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 Pinon [place]),  bladder calculus s/p cystoscopic radha lithotripsy on 8/31/20;   and newly findings of liver lesions on images who presents w/ hyperkalemia and GREG seen on outpatient lab work completed during scheduled IR liver biopsy earlier 8/29/23.    The pt reported that ~1 month ago ( around July 2023), he began experiencing gradual generalized weakness, malaise, abdominal pain, decreased appetite, and an unintentional 10-15 lb weight loss. Pt stated that he previously had a good appetite but recently gets exhausted easily while eating and loses his appetite after only a few bites of food; he has also noticed that with the first bite/first sip he feels a choking sensation. He went to the [facility] ED on 8/8/23 for these symptoms where he had a CT done showing pulmonary nodules and liver lesions suspicious for metastatic disease. He was then told to get a follow up MRI on 8/15/23 which showed hepatosplenomegaly with diffuse metastatic nodules throughout both lobes along with "multiple metastatic bone lesions throughout the pelvis". Earlier today 8/29/23 he was at the IR office (for planning a liver biopsy) and blood work was done which shows electrolyte derangements significant for Na 131, K 5.7, Cl 91, AG 16, BUN 47 (up from 28 8/8/23), Cr 1.95 (up from 1.6 8/8/23), Ca 11. He also explained that he was feeling too scared to go directly home from the IR office because of how weak he was feeling - he says at home he can walk unassisted without a cane or walker but only a few steps at a time 2/2 weakness. He also endorses lower abdominal pain 6/10 that wraps around to the back with occasional nausea but no vomiting or diarrhea. No fever, chills, syncope, shortness of breath, cough, chest pain, melena, hematochezia, excessive itchiness,  (29 Aug 2023 15:51).     After admission, 8/30 morning lab showing Na 129; Cl 93; K 5.1 Cr 1.81; [id number]; ; Total cynthia 3.4; Calcium 10.5; Lipase 83. Hb 12.4; WBC/PLT WNL   CT abd/pelvis without contrast on 8/29 showing No hydronephrosis or stone. Redemonstration of hepatomegaly with presumed innumerable hepatic metastatic lesions. Trace ascites and mild anasarca. no lytic bone lesions seen;  degenerative bone changes.   CT chest/abd/p without contrast on 8/8/23 showing few small, less than 6 mm left pulmonary nodules. Diffuse heterogeneous low attenuation nodularity throughout the liver is suspicious for metastatic disease. Prostatomegaly. No bowel obstruction. degenerative bone changes.     Of note, surgical pathology on 8/30/2020 showing 1. Bladder calculi removed (see gross description). 2. Bladder, posterior wall, transurethral resection of bladder tumor: Low-grade papillary urothelial carcinoma, inflamed, with focal marked reactive  changes. No evidence of lamina propria invasion. No muscularis propria identified. Bladder calculi. 3. Bladder neck, biopsy: Denuded and moderately inflamed urothelial mucosa with extensive cautery artifact, suboptimal for diagnosis (see note).      PAST MEDICAL & SURGICAL HISTORY:  BPH (Benign Prostatic Hyperplasia)      HLD (hyperlipidemia)      HTN (hypertension)      Nephrolithiasis      Enlarged prostate without lower urinary tract symptoms (luts)      Bladder calculi      Weight loss      HTN (hypertension)  no meds at present      Unilateral inguinal hernia, without obstruction or gangrene, not specified as recurrent      S/P tonsillectomy      Nephrolithiasis  s/p litho 2004      S/P herniorrhaphy  right side with mesh Sept 2020      BPH (Benign Prostatic Hyperplasia)  s/p biopsy 2010 - benign      History of prostate surgery  [facility] 2020          Allergies    Nuts (Angioedema)  No Known Drug Allergies  Iodine (Angioedema)  shellfish (Angioedema)    Intolerances        MEDICATIONS  (STANDING):  heparin   Injectable 5000 Unit(s) SubCutaneous every 12 hours    MEDICATIONS  (PRN):  acetaminophen     Tablet .. 650 milliGRAM(s) Oral every 6 hours PRN Moderate Pain (4 - 6)  polyethylene glycol 3350 17 Gram(s) Oral daily PRN Constipation  senna 2 Tablet(s) Oral at bedtime PRN Constipation      FAMILY HISTORY:  Family history of colon cancer in mother (Mother)        SOCIAL HISTORY: No EtOH, no tobacco    REVIEW OF SYSTEMS:    CONSTITUTIONAL: No weakness, fevers or chills  EYES/ENT: No visual changes;  No vertigo or throat pain   NECK: No pain or stiffness  RESPIRATORY: No cough, wheezing, hemoptysis; No shortness of breath  CARDIOVASCULAR: No chest pain or palpitations  GASTROINTESTINAL: No abdominal or epigastric pain. No nausea, vomiting, or hematemesis; No diarrhea or constipation. No melena or hematochezia.  GENITOURINARY: No dysuria, frequency or hematuria  NEUROLOGICAL: No numbness or weakness  SKIN: No itching, burning, rashes, or lesions   All other review of systems is negative unless indicated above.    Height (cm): 182.9 (08-29 @ 16:49)  Weight (kg): 48.4 (08-29 @ 16:49)  BMI (kg/m2): 14.5 (08-29 @ 16:49)  BSA (m2): 1.63 (08-29 @ 16:49)    T(F): 97.4 (08-30-23 @ 05:37), Max: 97.9 (08-29-23 @ 21:32)  HR: 61 (08-30-23 @ 05:37)  BP: 123/69 (08-30-23 @ 05:37)  RR: 17 (08-30-23 @ 05:37)  SpO2: 99% (08-30-23 @ 05:37)  Wt(kg): --    GENERAL: NAD, well-developed  HEAD:  Atraumatic, Normocephalic  EYES: EOMI, PERRLA, conjunctiva and sclera clear  NECK: Supple, No JVD  CHEST/LUNG: Clear to auscultation bilaterally; No wheeze  HEART: Regular rate and rhythm; No murmurs, rubs, or gallops  ABDOMEN: Soft, Nontender, Nondistended; Bowel sounds present  EXTREMITIES:  2+ Peripheral Pulses, No clubbing, cyanosis, or edema  NEUROLOGY: non-focal  SKIN: No rashes or lesions                          12.4   10.25 )-----------( 196      ( 29 Aug 2023 21:25 )             36.6       08-30    129<L>  |  93<L>  |  49<H>  ----------------------------<  76  5.1   |  24  |  1.81<H>    Ca    10.5      30 Aug 2023 06:00  Phos  2.9     08-30  Mg     2.20     08-30    TPro  6.9  /  Alb  3.7  /  TBili  3.4<H>  /  DBili  x   /  AST  389<H>  /  ALT  117<H>  /  AlkPhos  593<H>  08-30      Magnesium: 2.20 mg/dL (08-30 @ 06:00)  Phosphorus: 2.9 mg/dL (08-30 @ 06:00)  Magnesium: 2.20 mg/dL (08-29 @ 21:25)  Phosphorus: 3.1 mg/dL (08-29 @ 21:25)

## 2023-08-30 NOTE — PROGRESS NOTE ADULT - PROBLEM SELECTOR PLAN 2
- K 5.7 on outpatient labs 8/29  - Repeat BMP pending  - Consider lokelma if still elevated   - Continuous telemetry - Known liver mets (vs primary tumor?) s/p biopsy with IR outpatient 8/29. Will f/u results   - CTAP w/ hepatomegaly with presumed innumerable hepatic   metastatic lesions, trace ascites, mild anasarca  - Lipase slightly elevated at 83 8/29; pt w/o abd pain, back pain, or nausea this AM   - Oncology consulted: check AFP tumor marker, , CEA, PT/PTT/fibrinogen/d-dimer, PSA, viral hepatitis, LDH, uric acid  - Will consult GI regarding utility of MRCP   - Will f/u with patient's PCP for colonoscopy and PSA results

## 2023-08-30 NOTE — PROGRESS NOTE ADULT - PROBLEM SELECTOR PLAN 4
- Pt normotensive 8/29  - Hold lisinopril 5 mg ISO GREG - Pt outpatient 131 --> 133 8/29 PM --> 129 this AM  - Measure serum Osm, urine Osm, urine Na

## 2023-08-30 NOTE — SWALLOW BEDSIDE ASSESSMENT ADULT - SWALLOW EVAL: DIAGNOSIS
1. Functional oral stage for puree, regular solids and thin liquids marked by adequate oral acceptance, slow chewing for regular solids and adequate piecemeal transfer (3-4 swallows). 2 Functional pharyngeal phase for the aforementioned consistencies marked by a present pharyngeal swallow trigger with hyolaryngeal elevation noted upon digital palpation without evidence of impaired airway protection. Of Note: patient did NOT experience any feelings of choking during time of assessment.

## 2023-08-30 NOTE — PHYSICAL THERAPY INITIAL EVALUATION ADULT - ADDITIONAL COMMENTS
Pt. was left in restroom post PT Evaluation, no apparent distress, all lines intact. Pt. was left in restroom post PT Evaluation, no apparent distress, all lines intact, HR 77 bpm.

## 2023-08-30 NOTE — PROGRESS NOTE ADULT - ASSESSMENT
Pt is an 83 y.o. M w/ PMHx HTN, BPH, recent findings of metastatic cancer to lung, liver, and pelvic bone w/ unclear primary source at this point, present with severe generalized weakness ISO electrolyte derangements including hyponatremia and hyperkalemia along w/ GREG, most likely due to pre-renal cause secondary to poor po intake. This AM pt feeling well, hyperkalemia resolved but hyponatremia persistent, repeat BMP w/ BUN:Cr ratio >27.  Pt is an 83 y.o. M w/ PMHx HTN, BPH, recent findings of metastatic cancer to lung, liver, and pelvic bone w/ unclear primary source at this point, present with severe generalized weakness ISO electrolyte derangements including hyponatremia and hyperkalemia along w/ GREG, most likely due to pre-renal cause secondary to poor po intake. This AM pt feeling well, hyperkalemia resolved but hyponatremia persistent, repeat BMP w/ BUN:Cr ratio >27. Oncology consulted for management recommendations.

## 2023-08-30 NOTE — PROGRESS NOTE ADULT - PROBLEM SELECTOR PLAN 7
Diet: Regular diet + Ensure   DVT ppx: SubQ heparin   Code Status: Extensive GOC discussion with pt at bedside. Says he has a lot to live for including family, grandkids and wants "everything done to keep him alive" including CPR, intubation, and pressors.   Dispo: Pending clinical course, now doing more onc w/u inpatient

## 2023-08-30 NOTE — CONSULT NOTE ADULT - ASSESSMENT
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 Pinon [place]),  bladder calculus s/p cystoscopic radha lithotripsy on 8/31/20; and newly findings of suspected liver metastatic lesions on  image who presents w/ hyperkalemia and GREG seen on outpatient lab work during scheduled IR office visit (for liver biopsy) earlier 8/29/23.    #Suspected metastatic liver lesions   -around July 2023), he began experiencing gradual generalized weakness, malaise, abdominal pain, decreased appetite, and an unintentional 10-15 lb weight loss.   -went to the [facility] ED on 8/8/23 for these symptoms; CT chest/abd/p without contrast on 8/8/23 showing few small, less than 6 mm left pulmonary nodules. Diffuse heterogeneous low attenuation nodularity throughout the liver is suspicious for metastatic disease. Prostatomegaly. No bowel obstruction. degenerative bone changes.  -MRI on 8/15/23 which showed hepatosplenomegaly with diffuse metastatic nodules throughout both lobes along with multiple metastatic bone lesions throughout the pelvis (image and report available on [place] PACS).   -After this admission, 8/30 morning lab showing Na 129; Cl 93; K 5.1 Cr 1.81; [id number]; ; Total cynthia 3.4; Calcium 10.5; Lipase 83. Hb 12.4; WBC/PLT WNL   -CT abd/pelvis without contrast on 8/29 showing No hydronephrosis or stone. Redemonstration of hepatomegaly with presumed innumerable hepatic metastatic lesions. Trace ascites and mild anasarca. no lytic bone lesions seen;  degenerative bone changes.     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 Pinon [place]),  bladder calculus s/p cystoscopic radha lithotripsy on 8/31/20; and newly findings of suspected liver metastatic lesions on  image who presents w/ hyperkalemia and GREG seen on outpatient lab work during scheduled IR office visit (for liver biopsy) earlier 8/29/23.    #Suspected metastatic liver lesions   -around July 2023), he began experiencing gradual generalized weakness, malaise, abdominal pain, decreased appetite, and an unintentional 10-15 lb weight loss.   -went to the [facility] ED on 8/8/23 for these symptoms; CT chest/abd/p without contrast on 8/8/23 showing few small, less than 6 mm left pulmonary nodules. Diffuse heterogeneous low attenuation nodularity throughout the liver is suspicious for metastatic disease. Prostatomegaly. No bowel obstruction. degenerative bone changes.  -MRI on 8/15/23 which showed hepatosplenomegaly with diffuse metastatic nodules throughout both lobes along with multiple metastatic bone lesions throughout the pelvis (image and report available on [place] PACS).   -After this admission, 8/30 morning lab showing Na 129; Cl 93; K 5.1 Cr 1.81; [id number]; ; Total cynthia 3.4; Calcium 10.5; Lipase 83. Hb 12.4; WBC/PLT WNL   -CT abd/pelvis without contrast on 8/29 showing No hydronephrosis or stone. Redemonstration of hepatomegaly with presumed innumerable hepatic metastatic lesions. Trace ascites and mild anasarca. no lytic bone lesions seen;  degenerative bone changes.     Recommendations   -Request previous medical record from his PCP including colonoscopy (pt reported that he had one several years ago showing polyps only) and PSA (per primary team PSA 3.5 in 4/2023)  --Check tumor marker AFP,  CEA and PSA   -Check PT/PTT/fibrinogen/D-dimer   -Check LDH, Uric acid   -Check viral Hepatitis panel   - s/p IR liver biopsy; will f/u with pathology result   -GI consult necessary if Tbil/Transaminitis continuing trending up  -supportive care/nutritional/pain management   -GI ppx and DVT ppx per primary team     Note is not finalized until signed by attending   Radha Gambino PGY6   hem & onc fellow   [phone] or Team

## 2023-08-30 NOTE — PHYSICAL THERAPY INITIAL EVALUATION ADULT - PERTINENT HX OF CURRENT PROBLEM, REHAB EVAL
Per documentation, pt. presented with severe generalized weakness in setting of electrolyte derangements including hyponatremia and hyperkalemia along with GREG, most likely due to pre-renal cause secondary to poor po intake.

## 2023-08-30 NOTE — SWALLOW BEDSIDE ASSESSMENT ADULT - ASR SWALLOW RECOMMEND DIAG
Cinesophagram to objectively assess the swallow given patient reporting "choking" sensation with eating/drinking

## 2023-08-30 NOTE — PROGRESS NOTE ADULT - PROBLEM SELECTOR PLAN 5
Subjective   Patient ID: Anju is a 69 year old female.    Chief Complaint   Patient presents with   • Office Visit     leg discomfort, 360 visit       HPI    360 Visit        TIA  Chronic microvascular changes  30 day event monitor showed  Sinus rhythm heart rate ranging between 98 to 109/minNo arrhythmias noted No symptoms noted   Concerned about statin because it is so big, every time takes medication for over a month has to be cut in half due to lack of circulation in legs  Aspirin 81 mg po qd  Atorvastatin 40 mg po qd  No side effects    Pain in both lower extremities, since few months  bottom of feet feels swollen and legs feel weak then after maybe 1/2 hour of movement they feel normal and if sit down feeling comes back, feels tightness in legs exacerbated when walking only for a while then gets better, ankles feel like they are about to break    Acne Rosacea  Has been stable during the summer, had a flare-up today, uses  metrocream 0.75% that helps   No side effects    Eczema  Uses clobetasol, requesting refill, no side effects     Overweight  Has lost weight, will continue to watch diet and and work on weight loss    Osteopenia  Taking otc calcium and vitamin D, no side effects  dexa 7/2018    Social Hx  Former cigarette smoker for 40 + yrs, quit smoking 8 yrs ago       HEALTH MAINTENANCE  RECOMMENDATION TO PATIENT  COLONOSCOPY- declined, order given  MAMMOGRAM- 1/19/2018  PAP SMEAR- hysterectomy 2018  FLU VACCINE  PNEUMONIA VACCINE- 4/26/2018  EYE EXAM YEARLY  DENTAL EXAM TWICE A YEAR    Review of Systems  Constitutional symptoms - No fever, no loss of appetite.   Cardiovascular - Negative for chest pain, palpitations, swelling of legs.   Respiratory - No shortness of breath, cough, wheezing.   Gastrointestinal - No nausea or vomiting or abdominal pain or melena or hematochezia or change in bowel habits.   All systems were reviewed and are negative except as in HPI.    Lab Results  Lab Results   Component  Value Date    SODIUM 145 01/02/2020    POTASSIUM 3.9 01/02/2020    CHLORIDE 114 (H) 01/02/2020    CO2 23 01/02/2020    BUN 20 01/02/2020    CREATININE 0.65 01/02/2020    CALCIUM 8.9 01/02/2020    ALBUMIN 3.7 01/02/2020    TP 6.8 04/12/2018    BILIRUBIN 0.6 01/02/2020    ALKPT 125 (H) 01/02/2020    GPT 26 01/02/2020    AST 19 01/02/2020    GLUCOSE 92 01/02/2020     Lab Results   Component Value Date    CHOLESTEROL 128 01/02/2020    TRIGLYCERIDE 101 01/02/2020    HDL 38 (L) 01/02/2020    CALCLDL 70 01/02/2020     Lab Results   Component Value Date    WBC 7.3 01/02/2020    RBC 4.84 01/02/2020    HGB 13.7 01/02/2020    HCT 42.7 01/02/2020    MCV 88.2 01/02/2020    MCH 28.3 01/02/2020    MCHC 32.1 01/02/2020     01/02/2020    TLYMPH 25 01/02/2020    PMON 7 01/02/2020    PEOS 7 01/02/2020    PBASO 1 01/02/2020    ANEUT 4.4 01/02/2020    ALYMS 1.8 01/02/2020    JEFF 0.5 01/02/2020    AEOS 0.5 01/02/2020    ABASO 0.1 01/02/2020    NEUT NOT APPLICABLE 11/24/2019    TDIF AUTOMATED DIFFERENTIAL 01/02/2020    IANC NOT APPLICABLE 11/24/2019    NRBCRE 0 01/02/2020    PIMGR 0 01/02/2020    AIMGR 0.0 01/02/2020     Lab Results   Component Value Date    HGBA1C 5.3 11/25/2019    HGBA1C A1C%           eAG mg/dL 11/25/2019    HGBA1C 6.0            126 11/25/2019    HGBA1C 6.5            140 11/25/2019    HGBA1C 7.0            154 11/25/2019    HGBA1C 7.5            169 11/25/2019    HGBA1C 8.0            183 11/25/2019    HGBA1C 8.5            197 11/25/2019    HGBA1C 9.0            212 11/25/2019    HGBA1C 9.5            226 11/25/2019    HGBA1C 10.0           240 11/25/2019    HGBA1C ----DIABETIC SCREENING--- 11/25/2019    HGBA1C NON DIABETIC                 <5.7% 11/25/2019    HGBA1C INCREASED RISK                5.7-6.4% 11/25/2019    HGBA1C DIAGNOSTIC FOR DIABETES      >6.4% 11/25/2019    HGBA1C ----DIABETIC CONTROL--- 11/25/2019     Lab Results   Component Value Date    TSH 4.807 11/25/2019    TSH  11/25/2019     Findings  most consistent with euthyroid state, no additional testing suggested. TSH may be normal in patients with thyroid dysfunction and pituitary disease. Clinical correlation recommended.    TSH  11/25/2019     (Reflex TSH algorithm is not recommended in hospitalized patients. A variety of drugs, as well as serious acute and chronic illnesses may alter thyroid function tests. Commonly implicated drugs include glucocorticoids, dopamine, carbamazepine, iodine,    TSH  amiodarone, lithium and heparin.) 11/25/2019     Lab Results   Component Value Date    VITD25 24.7 (L) 04/12/2018     Lab Results   Component Value Date    VB12 353 04/12/2018     Current Problem List:  Patient Active Problem List   Diagnosis   • Dermatitis   • Eczema   • Ophthalmoplegic migraine, not intractable   • Overweight   • Acne rosacea   • Rash of face   • Osteopenia   • TIA (transient ischemic attack)       Past Medical History:   Diagnosis Date   • Stroke (CMS/HCC)        Past Surgical History:   Procedure Laterality Date   • Hysterectomy          Social History     Tobacco Use   • Smoking status: Former Smoker     Packs/day: 1.50     Years: 30.00     Pack years: 45.00   • Smokeless tobacco: Never Used   Substance Use Topics   • Alcohol use: Not Currently   • Drug use: Not Currently       Current Medications    ASPIRIN 81 MG EC TABLET    Take 1 tablet by mouth daily.       Screenings  Pain:       ADLs  ADL Before Admission: Independent  ADL Needs Assist: No  ADL Score: 12    Short of Breath or Fatigue with ADL's: No  Recent Decline in ADL's: No    Mobility Assist Devices: None  Are you deaf or do you have serious difficulty  hearing? : No  Are you blind or do you have serious difficulty seeing, even when wearing glasses?: No  Sensory Support Devices: Eyeglasses    iADLs       Depression PHQ2/9:  Little interest or pleasure in activity?: Not at all  Feeling down, depressed or hopeless?: Not at all  Initial depression screening score:: 0          Depression assessment/plan: Depression screening is negative no further plan needed.     Cognitive/Functional Status:  Are you deaf or do you have serious difficulty  hearing? : No  Are you blind or do you have serious difficulty seeing, even when wearing glasses?: No  Are you blind or do you have serious difficulty seeing, even when wearing glasses?: No  Because of a physical, mental, or emotional condition, do you have serious difficulty concentrating, remembering or making decisions? : No  Do you have serious difficulty walking or climbing stairs?: No  Do you have difficulty dressing or bathing?: No  Because of a physical, mental, or emotional condition, do you have difficulty doing errands alone?: No       STEADI-Fall Risk  Assessment of Fall Risk (STEADI) for Patients equal/greater than 18 Years of Age: Yes  I have fallen in the past year: No  I Use or Have Been Advised to Use a Cane or Walker to Get Around Safely: No  Sometimes I Feel Unsteady When I am Walking: No  I Steady Myself by Holding Onto Furniture When Walking at Home: No  I am Worried About Falling: No  I Need to Push With My Hands to Stand Up from a Chair: No  I Have Some Trouble Stepping Up Onto a Curb: No  I Often Have to Rush to the Toilet: No  I Have Lost Some Feeling in My Feet: Yes  I Take Medicine That Sometimes Makes Me Feel Lightheaded or More Tired Than Usual: No  I Take Medicine to Help Me Sleep or Improve My Mood: No  I Often Feel Sad or Depressed: No  STEADI Fall Score: 2    Hearing Impairment: Are you deaf or do you have serious difficulty  hearing? : No    Vision Impairment: Are you blind or do you have serious difficulty seeing, even when wearing glasses?: No    Vision/Hearing Screening: No exam data present     Speech Impairment: No    Urinary Incontinence:  Over the past 4 weeks how often have you experienced bladder control problems? Never    Objective   Vitals:    09/10/20 1554 09/10/20 1627   BP: 111/63    Pulse: 83     Resp: 16    Temp: 97.8 °F (36.6 °C)    SpO2: 98%    Weight: 74.3 kg (163 lb 12.8 oz)    Height: 5' 7\" (1.702 m)    PainSc:   0      Physical Exam  GENERAL APPEARANCE: Well developed, well nourished, alert and cooperative, and appears to be in no acute distress.  HEAD: normocephalic  EYES: PERRL, EOMI.  vision is grossly intact, no icterus, pallor or cyanosis   EARS: External auditory canals and tympanic membranes clear, no local tenderness, hearing grossly intact.   NOSE:  No nasal discharge.   THROAT:  Oral cavity and pharynx normal. No inflammation, swelling, exudate, or lesions.    CARDIAC: Normal S1 and S2. No S3, S4 or murmurs. pulse is regular and good volume. DP equivocal  LUNGS: Clear to auscultation without rales, rhonchi, wheezing or diminished breath sounds.  No accessory muscle usage, no local tenderness  GI/abdomen: soft, non tender, no hepatosplenomegaly, bowel sounds normal,  MUSKULOSKELETAL: no joint swelling or redness. Mild tenderness at fifth metatarsal   NECK: Neck supple, non-tender without cervical or supraclavicular lymphadenopathy, or masses  EXTREMITIES: No edema both legs bilaterally   NEUROLOGICAL  Normal gait.  Strength, sensory and reflexes symmetric and intact throughout bilaterally upper and lower limbs   SKIN: Skin normal color and temperature, no abnormal moles or lesions. Multiple moles on back and chest  PSYCHIATRIC: oriented to person, place, and time.     Assessment   Problem List Items Addressed This Visit        Circulatory    TIA (transient ischemic attack)    Relevant Medications    atorvastatin (LIPITOR) 20 MG tablet       Musculoskeletal    Osteopenia       Integumentary    Eczema    Relevant Orders    SERVICE TO DERMATOLOGY    Acne rosacea    Relevant Orders    SERVICE TO DERMATOLOGY       Other    Overweight      Other Visit Diagnoses     Visit for screening mammogram    -  Primary    Relevant Orders    MAMMO SCREENING BILATERAL W RHONDA    History of smoking 30 or more  pack years        Relevant Orders    CT LUNG CANCER SCREENING LOW DOSE WO CONTRAST    US VASC EXTREMITY LOWER DUPLEX ARTERIAL    Cerebral microvascular disease        Relevant Medications    atorvastatin (LIPITOR) 20 MG tablet    Pain in both lower extremities        Relevant Orders    D DIMER, QUANTITATIVE    US VASC EXTREMITY LOWER DUPLEX ARTERIAL    EMG - ROUTINE    Colon cancer screening        Relevant Orders    SCREENING COLONOSCOPY    Decreased dorsalis pedis pulse        Relevant Orders    US VASC EXTREMITY LOWER DUPLEX ARTERIAL    Seborrheic keratosis        Relevant Orders    SERVICE TO DERMATOLOGY    Nevus        Relevant Orders    SERVICE TO DERMATOLOGY          Plan     360 Visit      TIA (transient ischemic attack)  Chronic microvascular changes  Aspirin 81 mg po qd  Changed Atorvastatin 40 mg po qd to 20 mg po qd  No side effects    Pain in both lower extremities  D Dimer ordered   EMG ordered  US VASC lower extremity ordered    Decreased dorsalis pedis pulse  US VASC lower extremity ordered    Acne Rosacea   metrocream 0.75% cream  No side effects  Referral to dermatology consult given    Eczema, unspecified type  Clobetasol 0.05% ointment   No side effects  Referral to dermatology consult given    History of smoking 30 or more pack years  CT lung ordered  US VASC lower extremity ordered    Nevus   Referral to dermatology consult given    Seborrheic keratosis  Referral to dermatology consult given    Overweight  Diet and exercise plan discussed     Osteopenia, unspecified location    otc calcium and vitamin D  No side effects    Visit screening for mammogram  Mammogram ordered    Colon cancer screening      360 Assessment Plan:   Advance Care Planning Addressed: Advance Directives. Advance Care Planning discussed with patient.   Handouts (given to patient): Written handout given.   Health Maintenance (discussed and/or reviewed):   · Patient Education  · Medication needs  · Social Concerns  · Patient  does not require behavioral or case management referrals or other disease specific interventions  Patient Education (taught and/or recommended):  · Patient educated on disease process, etiology & prognosis  · Proper usage and side effects of medications reviewed & discussed  · Medical compliance with plan discussed and risks of non-compliance reviewed  · Return to clinic as clinically indicated as discussed with patient who verbalized understanding of & agreement with the plan    Return in about 6 weeks (around 10/22/2020).    Scribe Attestation: On 9/10/2020, Gilbert RESENDIZ scribed the services personally performed by Arvind Evans MD.   Provider Attestation: The documentation recorded by the scribe accurately reflects the service I personally performed and the decisions made by Arvind mattson MD.     Side effects of above medications discussed,   Any referrals issued, advised patient to call his/her insurance to get the list of providers who are in network and covered  all questions answered,   patient agrees with plan,   Advised to call back to get test results.  Risks of delay discussed  Will go to ER if any urgent symptoms     Diet: Regular diet + Ensure   DVT ppx: SubQ heparin   Code Status: Extensive GOC discussion with pt at bedside. Says he has a lot to live for including family, grandkids and wants "everything done to keep him alive" including CPR, intubation, and pressors.   Dispo: Pending clinical course - K 5.7 on outpatient labs 8/29  - Repeat K 5.3 8/29 PM --> 5.1 this AM. RESOLVED

## 2023-08-30 NOTE — SWALLOW BEDSIDE ASSESSMENT ADULT - COMMENTS
Internal Medicine note 8/30 "83 y.o. M w/ PMHx HTN, BPH, recent findings of metastatic cancer to lung, liver, and pelvic bone w/ unclear primary source at this point, present with severe generalized weakness ISO electrolyte derangements including hyponatremia and hyperkalemia along w/ GREG, most likely due to pre-renal cause secondary to poor po intake. This AM pt feeling well, hyperkalemia resolved but hyponatremia persistent, repeat BMP w/ BUN:Cr ratio >27."    Patient seen at bedside this AM for an initial assessment of the swallow function, at which time patient was alert. Patient is reporting feeling/sensation that he is going to "choke" when eating/drinking on the first bit or sip, however, no coughing/choking episodes have ever occured. Patient states no difficulty after first bite/sip. Patient further states he has also lost weight and has decreased appetite.

## 2023-08-30 NOTE — PROGRESS NOTE ADULT - PROBLEM SELECTOR PLAN 3
- BUN/Cr 47/1.95  - Pre-renal ISO poor po intake vs post-renal due to obstruction (less likely as pt reports voiding well on own, but will assess on CTAP)  - LR maintenance  - Avoid nephrotoxins, renally dose meds - BUN/Cr 49/18.1  - Pre-renal ISO poor po intake vs post-renal due to obstruction (less likely as pt reports voiding well on own, but will assess on CTAP)  - S/p LR maintenance  - Avoid nephrotoxins, renally dose meds

## 2023-08-31 ENCOUNTER — TRANSCRIPTION ENCOUNTER (OUTPATIENT)
Age: 83
End: 2023-08-31

## 2023-08-31 LAB
ALBUMIN SERPL ELPH-MCNC: 3.8 G/DL — SIGNIFICANT CHANGE UP (ref 3.3–5)
ALP SERPL-CCNC: 651 U/L — HIGH (ref 40–120)
ALT FLD-CCNC: 130 U/L — HIGH (ref 4–41)
ANION GAP SERPL CALC-SCNC: 16 MMOL/L — HIGH (ref 7–14)
AST SERPL-CCNC: 405 U/L — HIGH (ref 4–40)
BASOPHILS # BLD AUTO: 0.03 K/UL — SIGNIFICANT CHANGE UP (ref 0–0.2)
BASOPHILS NFR BLD AUTO: 0.4 % — SIGNIFICANT CHANGE UP (ref 0–2)
BILIRUB SERPL-MCNC: 3.7 MG/DL — HIGH (ref 0.2–1.2)
BUN SERPL-MCNC: 48 MG/DL — HIGH (ref 7–23)
CALCIUM SERPL-MCNC: 10.5 MG/DL — SIGNIFICANT CHANGE UP (ref 8.4–10.5)
CHLORIDE SERPL-SCNC: 92 MMOL/L — LOW (ref 98–107)
CO2 SERPL-SCNC: 23 MMOL/L — SIGNIFICANT CHANGE UP (ref 22–31)
CREAT SERPL-MCNC: 1.81 MG/DL — HIGH (ref 0.5–1.3)
EGFR: 37 ML/MIN/1.73M2 — LOW
EOSINOPHIL # BLD AUTO: 0.08 K/UL — SIGNIFICANT CHANGE UP (ref 0–0.5)
EOSINOPHIL NFR BLD AUTO: 1 % — SIGNIFICANT CHANGE UP (ref 0–6)
GLUCOSE SERPL-MCNC: 82 MG/DL — SIGNIFICANT CHANGE UP (ref 70–99)
HCT VFR BLD CALC: 36.6 % — LOW (ref 39–50)
HGB BLD-MCNC: 11.7 G/DL — LOW (ref 13–17)
IANC: 6.63 K/UL — SIGNIFICANT CHANGE UP (ref 1.8–7.4)
IMM GRANULOCYTES NFR BLD AUTO: 0.7 % — SIGNIFICANT CHANGE UP (ref 0–0.9)
LYMPHOCYTES # BLD AUTO: 0.68 K/UL — LOW (ref 1–3.3)
LYMPHOCYTES # BLD AUTO: 8.3 % — LOW (ref 13–44)
MAGNESIUM SERPL-MCNC: 2.3 MG/DL — SIGNIFICANT CHANGE UP (ref 1.6–2.6)
MCHC RBC-ENTMCNC: 31.2 PG — SIGNIFICANT CHANGE UP (ref 27–34)
MCHC RBC-ENTMCNC: 32 GM/DL — SIGNIFICANT CHANGE UP (ref 32–36)
MCV RBC AUTO: 97.6 FL — SIGNIFICANT CHANGE UP (ref 80–100)
MONOCYTES # BLD AUTO: 0.75 K/UL — SIGNIFICANT CHANGE UP (ref 0–0.9)
MONOCYTES NFR BLD AUTO: 9.1 % — SIGNIFICANT CHANGE UP (ref 2–14)
NEUTROPHILS # BLD AUTO: 6.63 K/UL — SIGNIFICANT CHANGE UP (ref 1.8–7.4)
NEUTROPHILS NFR BLD AUTO: 80.5 % — HIGH (ref 43–77)
NRBC # BLD: 0 /100 WBCS — SIGNIFICANT CHANGE UP (ref 0–0)
NRBC # FLD: 0 K/UL — SIGNIFICANT CHANGE UP (ref 0–0)
PHOSPHATE SERPL-MCNC: 2.4 MG/DL — LOW (ref 2.5–4.5)
PLATELET # BLD AUTO: 172 K/UL — SIGNIFICANT CHANGE UP (ref 150–400)
POTASSIUM SERPL-MCNC: 4.9 MMOL/L — SIGNIFICANT CHANGE UP (ref 3.5–5.3)
POTASSIUM SERPL-SCNC: 4.9 MMOL/L — SIGNIFICANT CHANGE UP (ref 3.5–5.3)
PROT SERPL-MCNC: 7 G/DL — SIGNIFICANT CHANGE UP (ref 6–8.3)
RBC # BLD: 3.75 M/UL — LOW (ref 4.2–5.8)
RBC # FLD: 13.8 % — SIGNIFICANT CHANGE UP (ref 10.3–14.5)
SODIUM SERPL-SCNC: 131 MMOL/L — LOW (ref 135–145)
WBC # BLD: 8.23 K/UL — SIGNIFICANT CHANGE UP (ref 3.8–10.5)
WBC # FLD AUTO: 8.23 K/UL — SIGNIFICANT CHANGE UP (ref 3.8–10.5)

## 2023-08-31 PROCEDURE — 99233 SBSQ HOSP IP/OBS HIGH 50: CPT | Mod: GC

## 2023-08-31 PROCEDURE — 99232 SBSQ HOSP IP/OBS MODERATE 35: CPT | Mod: GC

## 2023-08-31 PROCEDURE — 74230 X-RAY XM SWLNG FUNCJ C+: CPT | Mod: 26

## 2023-08-31 RX ORDER — ALLOPURINOL 300 MG
100 TABLET ORAL DAILY
Refills: 0 | Status: DISCONTINUED | OUTPATIENT
Start: 2023-08-31 | End: 2023-09-14

## 2023-08-31 RX ORDER — ACETAMINOPHEN 500 MG
2 TABLET ORAL
Refills: 0 | DISCHARGE

## 2023-08-31 RX ORDER — SODIUM,POTASSIUM PHOSPHATES 278-250MG
1 POWDER IN PACKET (EA) ORAL EVERY 6 HOURS
Refills: 0 | Status: COMPLETED | OUTPATIENT
Start: 2023-08-31 | End: 2023-08-31

## 2023-08-31 RX ORDER — SODIUM CHLORIDE 9 MG/ML
1000 INJECTION, SOLUTION INTRAVENOUS
Refills: 0 | Status: DISCONTINUED | OUTPATIENT
Start: 2023-08-31 | End: 2023-09-01

## 2023-08-31 RX ORDER — DUTASTERIDE 0.5 MG/1
1 CAPSULE, LIQUID FILLED ORAL
Qty: 0 | Refills: 0 | DISCHARGE

## 2023-08-31 RX ADMIN — HEPARIN SODIUM 5000 UNIT(S): 5000 INJECTION INTRAVENOUS; SUBCUTANEOUS at 17:29

## 2023-08-31 RX ADMIN — SODIUM CHLORIDE 75 MILLILITER(S): 9 INJECTION, SOLUTION INTRAVENOUS at 22:20

## 2023-08-31 RX ADMIN — SODIUM CHLORIDE 75 MILLILITER(S): 9 INJECTION, SOLUTION INTRAVENOUS at 17:55

## 2023-08-31 RX ADMIN — Medication 1 PACKET(S): at 12:46

## 2023-08-31 RX ADMIN — Medication 650 MILLIGRAM(S): at 12:45

## 2023-08-31 RX ADMIN — HEPARIN SODIUM 5000 UNIT(S): 5000 INJECTION INTRAVENOUS; SUBCUTANEOUS at 05:16

## 2023-08-31 RX ADMIN — Medication 1 PACKET(S): at 17:35

## 2023-08-31 RX ADMIN — Medication 100 MILLIGRAM(S): at 22:19

## 2023-08-31 RX ADMIN — Medication 650 MILLIGRAM(S): at 13:15

## 2023-08-31 NOTE — PROGRESS NOTE ADULT - ASSESSMENT
Pt is an 83 y.o. M w/ PMHx HTN, BPH, recent findings of metastatic cancer to lung, liver, and pelvic bone w/ unclear primary source at this point, present with severe generalized weakness ISO electrolyte derangements including hyponatremia and hyperkalemia along w/ GREG, most likely due to pre-renal cause secondary to poor po intake. This AM pt feeling well, hyponatremia improving *** repeat BMP w/ BUN:Cr ratio >27. Oncology consulted for management recommendations.  Pt is an 83 y.o. M w/ PMHx HTN, BPH, recent findings of metastatic cancer to lung, liver, and pelvic bone w/ unclear primary source at this point, present with severe generalized weakness ISO electrolyte derangements including hyponatremia and hyperkalemia along w/ GREG, most likely due to pre-renal cause secondary to poor po intake. This AM pt feeling well, hyponatremia improving, GREG stable w/ BUN:Cr ratio >27. Oncology consulted for management recommendations; workup ongoing. GI consulted for possible biliary obstruction w/ rising TBili

## 2023-08-31 NOTE — PROGRESS NOTE ADULT - ASSESSMENT
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 Pinon [place]),  bladder calculus s/p cystoscopic radha lithotripsy on 8/31/20; and newly findings of suspected liver metastatic lesions on  image who presents w/ hyperkalemia and GREG seen on outpatient lab work during scheduled IR office visit (for liver biopsy) earlier 8/29/23.    #Suspected metastatic liver lesions   -around July 2023, he had worsening generalized weakness, malaise, abdominal pain, decreased appetite, and an unintentional >20 lb weight loss.   -went to the [facility] ED on 8/8/23 for these symptoms; CT chest/abd/p without contrast on 8/8/23 showing few small, less than 6 mm left pulmonary nodules. Diffuse heterogeneous low attenuation nodularity throughout the liver is suspicious for metastatic disease. Prostatomegaly. No bowel obstruction. degenerative bone changes.  -MRI on 8/15/23 which showed hepatosplenomegaly with diffuse metastatic nodules throughout both lobes along with multiple metastatic bone lesions throughout the pelvis (image and report available on [place] PACS).   -After this admission, 8/30 morning lab showing Na 129; Cl 93; K 5.1 Cr 1.81; [id number]; ; Total cynthia 3.4; Calcium 10.5; Lipase 83. Hb 12.4; WBC/PLT WNL   -CT abd/pelvis without contrast on 8/29 showing No hydronephrosis or stone. Redemonstration of hepatomegaly with presumed innumerable hepatic metastatic lesions. Trace ascites and mild anasarca. no lytic bone lesions seen;  degenerative bone changes.     Recommendations   -Request previous medical record from his PCP including colonoscopy (pt reported that he had one several years ago showing polyps only) and PSA (per primary team PSA 3.5 in 4/2023)  --Check tumor marker AFP, , PSA; ;     - PT/PTT/fibrinogen wnl; D-dimer 5418 on 8/31  -Check LDH;  Uric acid 12.9 may start allopurinol 100mg (renal dose) daily, IVF; Phos/K/Ca/Mg level wnl on 8/31  --f/u Tbil/LFTs/Cr/CBC daily   -Check viral Hepatitis panel   - s/p IR liver biopsy on 8/29/23; will f/u with pathology result. Of note pt already has an appointment with Dr. Sol at [facility] next week.   -f/u swallow eval recommendation;    -f/u GI consult recommendations;  MRCP;   -Palliative care consult for pain management and supportive care; continue supportive care/nutritional/pain management;  -GI ppx and DVT ppx per primary team   -oncology team discussed with pt and his son (on the phone) on 8/31/23, recommend f/u with palliative and GI team for pain management/supportive care and biliary obstruction management respectively. pt and his son agree with the plan.     Note is not finalized until signed by attending   Radha Gambino PGY6   hem & onc fellow   [phone] or Team

## 2023-08-31 NOTE — DIETITIAN INITIAL EVALUATION ADULT - PERTINENT MEDS FT
MEDICATIONS  (STANDING):  heparin   Injectable 5000 Unit(s) SubCutaneous every 12 hours  potassium phosphate / sodium phosphate Powder (PHOS-NaK) 1 Packet(s) Oral every 6 hours    MEDICATIONS  (PRN):  acetaminophen     Tablet .. 650 milliGRAM(s) Oral every 6 hours PRN Moderate Pain (4 - 6)  polyethylene glycol 3350 17 Gram(s) Oral daily PRN Constipation  senna 2 Tablet(s) Oral at bedtime PRN Constipation

## 2023-08-31 NOTE — PROGRESS NOTE ADULT - PROBLEM SELECTOR PLAN 3
- BUN/Cr 49/18.1  - Pre-renal ISO poor po intake vs post-renal due to obstruction (less likely as pt reports voiding well on own, but will assess on CTAP)  - S/p LR maintenance  - Avoid nephrotoxins, renally dose meds - BUN/Cr 48/1.81, likely pre-renal ISO poor po intake   - S/p LR maintenance, BUN/ Cr stable  - Avoid nephrotoxins, renally dose meds

## 2023-08-31 NOTE — SWALLOW VFSS/MBS ASSESSMENT ADULT - DIAGNOSTIC IMPRESSIONS
1) Mild Oral Stage for Puree, Soft/Bite Size Solids, Moderately Thick Liquids, Mildly Thick Liquids, and Thin Liquids characterized by adequate oral containment, slow bolus manipulation for puree/solids, slow/prolonged mastication for soft/bite solids, slow anterior to posterior transfer with piecemeal transfer/deglutition (2 swallows), adequate oral clearance.   2) Moderate to Severe Pharyngeal Stage for Puree, Soft/Bite Solids, Moderately Thick Liquids, Mildly Thick Liquids, and Thin Liquids characterized by adequate initiation of the pharyngeal swallow, reduced laryngeal elevation, reduced base of tongue retraction, reduced laryngeal vestibule closure, reduced epiglottic deflection, and reduced pharyngeal contractility. There is Moderate Pharyngeal Clearance deficits at the vallecular greater for solids and Trace Pharyngeal Clearance deficits at the pyriforms across consistencies post primary swallow. Spontaneous reswallows (2-4) and liquid wash partially assists with pharyngeal clearance. There is Deep Laryngeal Penetration (Trace) to the level of the vocals leading to subsequent Aspiration (Silent) during a spontaneous reswallow. The patient is not sensate given no cough response. The patient is given cue to produce a strong volitional cough however the patient is unable to clear contrast from upper airway/trachea. There is No Aspiration before, during, or after the swallow for Puree, Soft/Bite Solids, Moderately Thick Liquids, and Mildly Thick Liquids. 1) Mild Oral Stage for Puree, Soft/Bite Size Solids, Moderately Thick Liquids, Mildly Thick Liquids, and Thin Liquids characterized by adequate oral containment, slow bolus manipulation for puree/soft solids, slow/prolonged mastication for soft/bite solids, slow anterior to posterior transfer with piecemeal transfer/deglutition (2 swallows), adequate oral clearance.   2) Moderate to Severe Pharyngeal Stage for Puree, Soft/Bite Solids, Moderately Thick Liquids, Mildly Thick Liquids, and Thin Liquids characterized by adequate initiation of the pharyngeal swallow, reduced laryngeal elevation, reduced base of tongue retraction, reduced laryngeal vestibule closure, reduced epiglottic deflection, and reduced pharyngeal contractility. There is Moderate Pharyngeal Clearance deficits at the vallecular (puree/solid greater than liquids) and Trace Residue at the pyriforms post primary swallow. Spontaneous reswallows (2-4) and liquid wash partially assists with pharyngeal clearance. There is Deep Laryngeal Penetration during the swallow to the level of the vocals leading to subsequent Aspiration (Silent). The patient is not sensate given no cough response. The patient is given cue to produce a strong volitional cough however the patient is unable to clear contrast from upper airway/trachea. There is No Aspiration before, during, or after the swallow for Puree, Soft/Bite Solids, Moderately Thick Liquids, and Mildly Thick Liquids. 1) Mild Oral Stage for Puree, Soft/Bite Size Solids, Moderately Thick Liquids, Mildly Thick Liquids, and Thin Liquids characterized by adequate oral containment, slow bolus manipulation for puree/soft solids, slow/prolonged mastication for soft/bite solids, slow anterior to posterior transfer with piecemeal transfer/deglutition (2 swallows), adequate oral clearance.   2) Moderate to Severe Pharyngeal Stage for Puree, Soft/Bite Solids, Moderately Thick Liquids, Mildly Thick Liquids, and Thin Liquids characterized by adequate initiation of the pharyngeal swallow, reduced laryngeal elevation, reduced base of tongue retraction, reduced laryngeal vestibule closure, reduced epiglottic deflection, and reduced pharyngeal contractility. There is Moderate Pharyngeal Clearance deficits at the vallecular (puree/soft solid greater than liquids) and Trace Residue at the pyriforms post primary swallow. Spontaneous reswallows (2-4) and liquid wash partially assists with pharyngeal clearance. There is Deep Laryngeal Penetration during the swallow to the level of the vocals leading to subsequent Aspiration (Silent). The patient is not sensate given no cough response. The patient is given cue to produce a strong volitional cough however the patient is unable to clear contrast from upper airway/trachea. There is No Aspiration before, during, or after the swallow for Puree, Soft/Bite Solids, Moderately Thick Liquids, and Mildly Thick Liquids.

## 2023-08-31 NOTE — SWALLOW VFSS/MBS ASSESSMENT ADULT - ORAL PHASE
Slow Bolus Manipulation [place]/within functional limits Within functional limits Slow/Prolonged Mastication within functional limits

## 2023-08-31 NOTE — DISCHARGE NOTE PROVIDER - NSDCMRMEDTOKEN_GEN_ALL_CORE_FT
Colace 100 mg oral capsule: 1 cap(s) orally 3 times a day MDD:1  curcumin elite turmeric extract: 1 tab(s) orally once a day (Life Extension brand)  lisinopril 5 mg oral tablet: 1 tab(s) orally once a day  MacuGuard 10 mg oral capsule: 1 cap(s) orally once a day (with meals) with dinner  multivitamin: 1 tab(s) orally 2 times a day ([employer] Extension brand) 1 with breakfast and 1 with dinner  Outpatient Physical Therapy ICD10: R62.7: 2-3 times per week for 4-6 weeks  ultra prostate formula: 1 tab(s) orally once a day ([employer] Extension brand)   Colace 100 mg oral capsule: 1 cap(s) orally 3 times a day MDD:1  curcumin elite turmeric extract: 1 tab(s) orally once a day ([employer] Extension brand)  Home Physical Therapy ICD10: R62.7: 2-3 times per week for 4-6 weeks  lisinopril 5 mg oral tablet: 1 tab(s) orally once a day  MacuGuard 10 mg oral capsule: 1 cap(s) orally once a day (with meals) with dinner  multivitamin: 1 tab(s) orally 2 times a day ([employer] Extension brand) 1 with breakfast and 1 with dinner  ultra prostate formula: 1 tab(s) orally once a day ([employer] Extension brand)   Colace 100 mg oral capsule: 1 cap(s) orally 3 times a day MDD:1  curcumin elite turmeric extract: 1 tab(s) orally once a day ([employer] Extension brand)  Diflucan 100 mg oral tablet: 1 tab(s) orally every 24 hours Please continue for 7 days  Home Physical Therapy ICD10: R62.7: 2-3 times per week for 4-6 weeks  lisinopril 5 mg oral tablet: 1 tab(s) orally once a day  MacuGuard 10 mg oral capsule: 1 cap(s) orally once a day (with meals) with dinner  mirtazapine 15 mg oral tablet: 1 tab(s) orally once a day (at bedtime)  multivitamin: 1 tab(s) orally 2 times a day ([employer] brand) 1 with breakfast and 1 with dinner  senna leaf extract oral tablet: 2 tab(s) orally once a day (at bedtime) As needed Constipation  ultra prostate formula: 1 tab(s) orally once a day ([employer] Extension brand)   Colace 100 mg oral capsule: 1 cap(s) orally 3 times a day MDD:1  curcumin elite turmeric extract: 1 tab(s) orally once a day ([employer] Extension brand)  Diflucan 100 mg oral tablet: 1 tab(s) orally every 24 hours Please continue through 9/20/23  dutasteride 0.5 mg oral capsule: 1 cap(s) orally once a day  Home Physical Therapy ICD10: R62.7: 2-3 times per week for 4-6 weeks  lisinopril 5 mg oral tablet: 1 tab(s) orally once a day  MacuGuard 10 mg oral capsule: 1 cap(s) orally once a day (with meals) with dinner  mirtazapine 15 mg oral tablet: 1 tab(s) orally once a day (at bedtime)  multivitamin: 1 tab(s) orally 2 times a day ([employer] Extension brand) 1 with breakfast and 1 with dinner  senna leaf extract oral tablet: 2 tab(s) orally once a day (at bedtime) As needed Constipation  ultra prostate formula: 1 tab(s) orally once a day (Life Extension brand)

## 2023-08-31 NOTE — DISCHARGE NOTE PROVIDER - NSDCCPTREATMENT_GEN_ALL_CORE_FT
PRINCIPAL PROCEDURE  Procedure: ERCP  Findings and Treatment: In the hospital you had an endoscopy of your stomach and small intestine.    EGD:                       - Esophageal plaques were found, suspicious for                        candidiasis. Biopsied.                       - Benign-appearing esophageal stenosis. Dilated with                        Savary to 14mm.                       - Gastritis. Biopsied.  - Gastric polyps                       - Mild extrinsic compression of duodenal bulb                       ERCP:                       - The major papilla appeared normal.                       - A single localized biliary stricture was found in the                        lower third of the main bile duct.                       - The biliary tree was swept and sludge was found.                       - One covered metal stent was placed into the common                        bile duct (10 mm x 6 cm covered metal stent).        SECONDARY PROCEDURE  Procedure: CT scan  Findings and Treatment: IMPRESSION:  No hydronephrosis or stone.  Redemonstration of hepatomegaly with presumed innumerable hepatic   metastatic lesions.  Trace ascites and mild anasarca.

## 2023-08-31 NOTE — PROGRESS NOTE ADULT - PROBLEM SELECTOR PLAN 4
- Improved to 131 this Am   - Measure serum Osm, urine Osm, urine Na - Improved to 131 this Am   - Serum Osm, urine Osm, urine Na not collected yesterday 8/30

## 2023-08-31 NOTE — PROGRESS NOTE ADULT - SUBJECTIVE AND OBJECTIVE BOX
INTERVAL HPI/OVERNIGHT EVENTS:  Patient S&E at bedside. No acute o/n events, Pt sit on the chair and reports that he has epigastric and mid abd pain aggravated with eating/drinking, decreased appetite. Endorses fatigue, weakness. Denied fever/chills, diarrhea, headache, SOB.     VITAL SIGNS:  T(F): 97.4 (08-31-23 @ 05:11)  HR: 74 (08-31-23 @ 05:11)  BP: 113/81 (08-31-23 @ 05:11)  RR: 17 (08-31-23 @ 05:11)  SpO2: 99% (08-31-23 @ 05:11)  Wt(kg): --    PHYSICAL EXAM:    Constitutional: NAD; cachetic, AAOX3 Responded verbally well   Eyes: EOMI, sclera non-icteric  Neck: supple  Respiratory: CTAB, no wheezes or crackles   Cardiovascular: RRR  Gastrointestinal: soft, NTND, + BS  Extremities: no cyanosis, clubbing or edema   Neurological: awake and alert      MEDICATIONS  (STANDING):  heparin   Injectable 5000 Unit(s) SubCutaneous every 12 hours  lactated ringers. 1000 milliLiter(s) (75 mL/Hr) IV Continuous <Continuous>    MEDICATIONS  (PRN):  acetaminophen     Tablet .. 650 milliGRAM(s) Oral every 6 hours PRN Moderate Pain (4 - 6)  polyethylene glycol 3350 17 Gram(s) Oral daily PRN Constipation  senna 2 Tablet(s) Oral at bedtime PRN Constipation      Allergies    Nuts (Angioedema)  No Known Drug Allergies  Iodine (Angioedema)  shellfish (Angioedema)    Intolerances        LABS:                        11.7   8.23  )-----------( 172      ( 31 Aug 2023 04:50 )             36.6     08-31    131<L>  |  92<L>  |  48<H>  ----------------------------<  82  4.9   |  23  |  1.81<H>    Ca    10.5      31 Aug 2023 04:50  Phos  2.4     08-31  Mg     2.30     08-31    TPro  7.0  /  Alb  3.8  /  TBili  3.7<H>  /  DBili  x   /  AST  405<H>  /  ALT  130<H>  /  AlkPhos  651<H>  08-31    PT/INR - ( 30 Aug 2023 17:00 )   PT: 12.9 sec;   INR: 1.15 ratio         PTT - ( 30 Aug 2023 17:00 )  PTT:31.8 sec  Urinalysis Basic - ( 31 Aug 2023 04:50 )    Color: x / Appearance: x / SG: x / pH: x  Gluc: 82 mg/dL / Ketone: x  / Bili: x / Urobili: x   Blood: x / Protein: x / Nitrite: x   Leuk Esterase: x / RBC: x / WBC x   Sq Epi: x / Non Sq Epi: x / Bacteria: x        RADIOLOGY & ADDITIONAL TESTS:  Studies reviewed.

## 2023-08-31 NOTE — DISCHARGE NOTE PROVIDER - NSDCFUSCHEDAPPT_GEN_ALL_CORE_FT
Milagros Sol  [facility]  [facility] [address] [facility]  Scheduled Appointment: 09/07/2023     Milagros Sol  [facility]  [facility] [address] [facility]  Scheduled Appointment: 09/14/2023

## 2023-08-31 NOTE — DIETITIAN INITIAL EVALUATION ADULT - PERTINENT LABORATORY DATA
08-31    131<L>  |  92<L>  |  48<H>  ----------------------------<  82  4.9   |  23  |  1.81<H>    Ca    10.5      31 Aug 2023 04:50  Phos  2.4     08-31  Mg     2.30     08-31    TPro  7.0  /  Alb  3.8  /  TBili  3.7<H>  /  DBili  x   /  AST  405<H>  /  ALT  130<H>  /  AlkPhos  651<H>  08-31

## 2023-08-31 NOTE — PROGRESS NOTE ADULT - ATTENDING COMMENTS
appreciate heme onc recs - would obtain GI eval.  Pt with mild abd pain.  Pt and family want to hold on palliative eval until path results back to have GOC convo.

## 2023-08-31 NOTE — PROGRESS NOTE ADULT - SUBJECTIVE AND OBJECTIVE BOX
Malka Akers MD  PGY1  Preferred contact via [employer]      Patient is a 83y old  Male who presents with a chief complaint of Hyperkalemia, GREG (29 Aug 2023 15:51)      SUBJECTIVE / OVERNIGHT EVENTS: No ON events Feeling well this morning, feels less weak compared to when he arrived at [facility] yesterday.     MEDICATIONS  (STANDING):  heparin   Injectable 5000 Unit(s) SubCutaneous every 12 hours  lactated ringers. 1000 milliLiter(s) (75 mL/Hr) IV Continuous <Continuous>    MEDICATIONS  (PRN):  acetaminophen     Tablet .. 650 milliGRAM(s) Oral every 6 hours PRN Moderate Pain (4 - 6)    PHYSICAL EXAM:    Vital Signs Last 24 Hrs  T(C): 36.3 (31 Aug 2023 05:11), Max: 36.6 (30 Aug 2023 17:26)  T(F): 97.4 (31 Aug 2023 05:11), Max: 97.8 (30 Aug 2023 17:26)  HR: 74 (31 Aug 2023 05:11) (60 - 74)  BP: 113/81 (31 Aug 2023 05:11) (108/67 - 122/77)  RR: 17 (31 Aug 2023 05:11) (17 - 17)  SpO2: 99% (31 Aug 2023 05:11) (99% - 100%)    Parameters below as of 31 Aug 2023 05:11  Patient On (Oxygen Delivery Method): room air        GENERAL: Extremely frail, resting in bed comfortably   HEAD:  Atraumatic, normocephalic, Temporal wasting  EYES: EOMI, PERRLA, conjunctiva and sclera clear  ENT: Moist mucous membranes  NECK: Supple, no JVD  HEART: Regular rate and rhythm, no murmurs, rubs, or gallops  LUNGS: Unlabored respirations.  Clear to auscultation bilaterally, no crackles, wheezing, or rhonchi  ABDOMEN: No tenderness to palpation in all 4 quadrants. Soft, ND, no rebound, no guarding; no palpable masses  EXTREMITIES: 2+ peripheral pulses bilaterally. No clubbing, cyanosis, or edema  NERVOUS SYSTEM:  A&Ox3, no focal deficits   SKIN: No rashes or lesions    LABS:                                  11.7   8.23  )-----------( 172      ( 31 Aug 2023 04:50 )             36.6     08-31    131<L>  |  92<L>  |  48<H>  ----------------------------<  82  4.9   |  23  |  1.81<H>    Ca    10.5      31 Aug 2023 04:50  Phos  2.4       Mg     2.30         TPro  7.0  /  Alb  3.8  /  TBili  3.7<H>  /  DBili  x   /  AST  405<H>  /  ALT  130<H>  /  AlkPhos  651<H>      Hepatic Function Panel (23 @ 17:00)   Indirect Reacting Bilirubin: 0.8 mg/dL  Protein Total: 6.9 g/dL  Albumin: 3.8 g/dL  Bilirubin Total: 3.6 mg/dL  Bilirubin Direct: 2.8 mg/dL  Alkaline Phosphatase: 648 U/L  Aspartate Aminotransferase (AST/SGOT): 431 U/L  Alanine Aminotransferase (ALT/SGPT): 124 U/L    Uric Acid (23 @ 17:00)   Uric Acid: 12.9 mg/dL    Carcinoembryonic Antigen (23 @ 17:00)   Carcinoembryonic Antigen: 164.0 ng/mL    D-Dimer Assay, Quantitative (23 @ 17:00)   D-Dimer Assay, Quantitative: 5418    Lipase (23 @ 21:25)   Lipase: 83 U/L    Lactate, Blood (23 @ 06:00)   Lactate, Blood: 1.8 mmol/L    Urinalysis Basic - ( 30 Aug 2023 02:55 )    Color: Dark Yellow / Appearance: Cloudy / S.022 / pH: x  Gluc: x / Ketone: Trace mg/dL  / Bili: Small / Urobili: 1.0 mg/dL   Blood: x / Protein: 100 mg/dL / Nitrite: Negative   Leuk Esterase: Negative / RBC: 5 /HPF / WBC 4 /HPF   Sq Epi: x / Non Sq Epi: 3 /HPF / Bacteria: Negative /HPF        RADIOLOGY & ADDITIONAL TESTS:    < from: CT Abdomen and Pelvis No Cont (23 @ 22:38) >  IMPRESSION:  No hydronephrosis or stone.  Redemonstration of hepatomegaly with presumed innumerable hepatic   metastatic lesions.  Trace ascites and mild anasarca.    < end of copied text >       Malka Akers MD  PGY1  Preferred contact via [employer] Teams      Patient is a 83y old  Male who presents with a chief complaint of Hyperkalemia, GREG (29 Aug 2023 15:51)      SUBJECTIVE / OVERNIGHT EVENTS: No ON events. Feeling well this morning, no abdominal pain or back pain but still feeling very "full" even with minimal bites to eat or drink. Concerned about going home ISO generalized weakness.    MEDICATIONS  (STANDING):  heparin   Injectable 5000 Unit(s) SubCutaneous every 12 hours  lactated ringers. 1000 milliLiter(s) (75 mL/Hr) IV Continuous <Continuous>    MEDICATIONS  (PRN):  acetaminophen     Tablet .. 650 milliGRAM(s) Oral every 6 hours PRN Moderate Pain (4 - 6)    REVIEW OF SYSTEMS:    CONSTITUTIONAL: No weakness, fevers or chills  EYES/ENT: No visual changes;  No vertigo or throat pain   NECK: No pain or stiffness  RESPIRATORY: No cough, wheezing, hemoptysis; No shortness of breath  CARDIOVASCULAR: No chest pain or palpitations  GASTROINTESTINAL: No abdominal or epigastric pain. No nausea, vomiting, or hematemesis; No diarrhea or constipation. No melena or hematochezia.  GENITOURINARY: No dysuria, frequency or hematuria  NEUROLOGICAL: No numbness or weakness  SKIN: +Slight itching in epigastric region, no rash    PHYSICAL EXAM:    Vital Signs Last 24 Hrs  T(C): 36.3 (31 Aug 2023 05:11), Max: 36.6 (30 Aug 2023 17:26)  T(F): 97.4 (31 Aug 2023 05:11), Max: 97.8 (30 Aug 2023 17:26)  HR: 74 (31 Aug 2023 05:11) (60 - 74)  BP: 113/81 (31 Aug 2023 05:11) (108/67 - 122/77)  RR: 17 (31 Aug 2023 05:11) (17 - 17)  SpO2: 99% (31 Aug 2023 05:11) (99% - 100%)    Parameters below as of 31 Aug 2023 05:11  Patient On (Oxygen Delivery Method): room air      GENERAL: Extremely frail, resting in bed comfortably   HEAD:  Atraumatic, normocephalic, Temporal wasting  EYES: EOMI, PERRLA, conjunctiva and sclera clear, no scleral icterus   ENT: Moist mucous membranes, no jaundice underneath tongue  NECK: Supple, no JVD  HEART: Regular rate and rhythm, no murmurs, rubs, or gallops  LUNGS: Unlabored respirations.  Clear to auscultation bilaterally, no crackles, wheezing, or rhonchi  ABDOMEN: No tenderness to palpation in all 4 quadrants. Soft, ND, no rebound, no guarding; no palpable masses  EXTREMITIES: 2+ peripheral pulses bilaterally. No clubbing, cyanosis, or edema  NERVOUS SYSTEM:  A&Ox3, no focal deficits   SKIN: No rashes or lesions, no jaundice     LABS:                           11.7   8.23  )-----------( 172      ( 31 Aug 2023 04:50 )             36.6         131<L>  |  92<L>  |  48<H>  ----------------------------<  82  4.9   |  23  |  1.81<H>    Ca    10.5      31 Aug 2023 04:50  Phos  2.4       Mg     2.30         TPro  7.0  /  Alb  3.8  /  TBili  3.7<H>  /  DBili  x   /  AST  405<H>  /  ALT  130<H>  /  AlkPhos  651<H>      PT/INR - ( 30 Aug 2023 17:00 )   PT: 12.9 sec;   INR: 1.15 ratio    PTT - ( 30 Aug 2023 17:00 )  PTT:31.8 sec    Hepatic Function Panel (23 @ 17:00)   Indirect Reacting Bilirubin: 0.8 mg/dL  Protein Total: 6.9 g/dL  Albumin: 3.8 g/dL  Bilirubin Total: 3.6 mg/dL  Bilirubin Direct: 2.8 mg/dL  Alkaline Phosphatase: 648 U/L  Aspartate Aminotransferase (AST/SGOT): 431 U/L  Alanine Aminotransferase (ALT/SGPT): 124 U/L    Uric Acid (23 @ 17:00)   Uric Acid: 12.9 mg/dL    Carcinoembryonic Antigen (23 @ 17:00)   Carcinoembryonic Antigen: 164.0 ng/mL    D-Dimer Assay, Quantitative (23 @ 17:00)   D-Dimer Assay, Quantitative: 5418    Lipase (23 @ 21:25)   Lipase: 83 U/L    Lactate, Blood (23 @ 06:00)   Lactate, Blood: 1.8 mmol/L    Urinalysis Basic - ( 30 Aug 2023 02:55 )    Color: Dark Yellow / Appearance: Cloudy / S.022 / pH: x  Gluc: x / Ketone: Trace mg/dL  / Bili: Small / Urobili: 1.0 mg/dL   Blood: x / Protein: 100 mg/dL / Nitrite: Negative   Leuk Esterase: Negative / RBC: 5 /HPF / WBC 4 /HPF   Sq Epi: x / Non Sq Epi: 3 /HPF / Bacteria: Negative /HPF        RADIOLOGY & ADDITIONAL TESTS:    < from: CT Abdomen and Pelvis No Cont (23 @ 22:38) >  IMPRESSION:  No hydronephrosis or stone.  Redemonstration of hepatomegaly with presumed innumerable hepatic   metastatic lesions.  Trace ascites and mild anasarca.    < end of copied text >

## 2023-08-31 NOTE — DIETITIAN INITIAL EVALUATION ADULT - ORAL INTAKE PTA/DIET HISTORY
Pt reports poor appetite for past one month. UBW- 130# 3months ago per pt and wt loss 23#/17.6% in 3 months.

## 2023-08-31 NOTE — PROGRESS NOTE ADULT - PROBLEM SELECTOR PLAN 1
- ISO newly found metastatic cancer to liver, lung, bone  - Severe generalized weakness, pt was scared to go home following IR biopsy 8/29, sent straight to [facility]. Minimal appetite, fatigue, unintentional weight loss  - Speech + swallow eval; rec regular diet and thin liquids, cinesophagram for choking sensation  - PT eval - ISO newly found metastatic cancer to liver, lung, bone  - Severe generalized weakness, pt was scared to go home following IR biopsy 8/29, sent straight to [facility]. Minimal appetite, fatigue, unintentional weight loss  - Speech + swallow eval with cinesophagram - recommending minced and moist with mildly thick liquids. Patient has silent aspiration; will potentially consider neuro eval for unknown etiology of dysphagia  - Aspiration precautions   - PT eval --> rec home PT

## 2023-08-31 NOTE — PROGRESS NOTE ADULT - PROBLEM SELECTOR PLAN 7
Diet: Regular diet + Ensure   DVT ppx: SubQ heparin   Code Status: Extensive GOC discussion with pt at bedside. Says he has a lot to live for including family, grandkids and wants "everything done to keep him alive" including CPR, intubation, and pressors.   Dispo: Pending clinical course, now doing more onc w/u inpatient Diet: Minced and moist with mildly thickened liquids + Ensure; nutrition services consult placed for assistance with meeting nutritional needs given pt's low BMI   DVT ppx: SubQ heparin   Code Status: Extensive GOC discussion with pt at bedside. Says he has a lot to live for including family, grandkids and wants "everything done to keep him alive" including CPR, intubation, and pressors. Will consider consulting palliative once primary diagnosis established and prognosis is more clear; pt with minimal pain needs at this time   Dispo: Pending clinical course, now doing more onc w/u inpatient

## 2023-08-31 NOTE — CONSULT NOTE ADULT - SUBJECTIVE AND OBJECTIVE BOX
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 Pinon [place]), bladder calculus s/p cystoscopic radha lithotripsy on 8/31/20;   and new findings of liver lesions on images who presents w/ hyperkalemia and GREG seen on outpatient lab work completed during scheduled IR liver biopsy earlier 8/29/23. Patient states he has been experiencing worsening weakness, abdominal pain, decreased appetite, weight loss for overt a month. He presented to  ED on 8/8/23 for these symptoms where he had a CT done showing pulmonary nodules and liver lesions suspicious for metastatic disease. He was discharged and obtained an MR A/P on 8/15/23 which showed hepatosplenomegaly with diffuse metastatic nodules throughout both lobes along with multiple metastatic bone lesions throughout the pelvis. Patient had an IR biopsy of his liver lesion on 8/29 at which time he was found to have multiple electrolyte derangements including elevated K and GREG.   After admission, CT abd/pelvis without contrast 8/29 redemonstrated hepatomegaly with presumed innumerable hepatic metastatic lesions. Trace ascites and mild anasarca.   Today, patient endorses waxing/waning abdominal pain. States it is more significant in the RUQ and radiates to the back. He also has been taking in very little po. HE denies dysphagia or odynophagia, but endorses difficulty initiating a swallow and early satiety. Family states patient is also afraid to eat in case it makes his pain more severe. This has been worsening over the past few weeks. Denies fevers, chills, melena, hematochezia.     Allergies:  Nuts (Angioedema)  No Known Drug Allergies  Iodine (Angioedema)  shellfish (Angioedema)    Home Medications:  curcumin elite turmeric extract: 1 tab(s) orally once a day ([employer] brand) (29 Aug 2023 08:09)  lisinopril 5 mg oral tablet: 1 tab(s) orally once a day (29 Aug 2023 08:09)  MacuGuard 10 mg oral capsule: 1 cap(s) orally once a day (with meals) with dinner (29 Aug 2023 08:09)  multivitamin: 1 tab(s) orally 2 times a day ([employer] brand) 1 with breakfast and 1 with dinner (29 Aug 2023 08:09)  ultra prostate formula: 1 tab(s) orally once a day ([employer] brand) (29 Aug 2023 08:09)    Hospital Medications:  acetaminophen     Tablet .. 650 milliGRAM(s) Oral every 6 hours PRN  heparin   Injectable 5000 Unit(s) SubCutaneous every 12 hours  polyethylene glycol 3350 17 Gram(s) Oral daily PRN  potassium phosphate / sodium phosphate Powder (PHOS-NaK) 1 Packet(s) Oral every 6 hours  senna 2 Tablet(s) Oral at bedtime PRN    PMHX/PSHX:  BPH (Benign Prostatic Hyperplasia)    HLD (hyperlipidemia)    HTN (hypertension)    Nephrolithiasis    Enlarged prostate without lower urinary tract symptoms (luts)    Bladder calculi    Weight loss    HTN (hypertension)    Unilateral inguinal hernia, without obstruction or gangrene, not specified as recurrent    S/P tonsillectomy    Nephrolithiasis    S/P herniorrhaphy    BPH (Benign Prostatic Hyperplasia)    History of prostate surgery    Family history:  Family history of colon cancer in father    Family history of colon cancer in mother (Mother)    Denies family history of colon cancer/polyps, stomach cancer/polyps, pancreatic cancer/masses, liver cancer/disease, ovarian cancer and endometrial cancer.    Social History:   Tob: Denies  EtOH: Denies  Illicit Drugs: Denies    ROS:   General:  No wt loss, fevers, chills, night sweats, fatigue  Eyes:  Good vision, no reported pain  ENT:  No sore throat, pain, runny nose, dysphagia  CV:  No pain, palpitations, hypo/hypertension  Pulm:  No dyspnea, cough, tachypnea, wheezing  GI:  see HPI  :  No pain, bleeding, incontinence, nocturia  Muscle:  No pain, weakness  Neuro:  No weakness, tingling, memory problems  Psych:  No fatigue, insomnia, mood problems, depression  Endocrine:  No polyuria, polydipsia, cold/heat intolerance  Heme:  No petechiae, ecchymosis, easy bruisability  Skin:  No rash, tattoos, scars, edema    PHYSICAL EXAM:   GENERAL:  No acute distress  HEENT:  NCAT, no scleral icterus   CHEST:  no respiratory distress  HEART:  Regular rate and rhythm  ABDOMEN:  Soft, non-tender, non-distended   EXTREMITIES: No edema  SKIN:  No rash/erythema/ecchymoses  NEURO:  Alert and oriented x 3     Vital Signs:  Vital Signs Last 24 Hrs  T(C): 36.3 (31 Aug 2023 05:11), Max: 36.6 (30 Aug 2023 17:26)  T(F): 97.4 (31 Aug 2023 05:11), Max: 97.8 (30 Aug 2023 17:26)  HR: 74 (31 Aug 2023 05:11) (60 - 74)  BP: 113/81 (31 Aug 2023 05:11) (108/67 - 122/77)  BP(mean): --  RR: 17 (31 Aug 2023 05:11) (17 - 17)  SpO2: 99% (31 Aug 2023 05:11) (99% - 100%)    Parameters below as of 31 Aug 2023 05:11  Patient On (Oxygen Delivery Method): room air      Daily     Daily     LABS:                        11.7   8.23  )-----------( 172      ( 31 Aug 2023 04:50 )             36.6     Mean Cell Volume: 97.6 fL (08-31-23 @ 04:50)    08-31    131<L>  |  92<L>  |  48<H>  ----------------------------<  82  4.9   |  23  |  1.81<H>    Ca    10.5      31 Aug 2023 04:50  Phos  2.4     08-31  Mg     2.30     08-31    TPro  7.0  /  Alb  3.8  /  TBili  3.7<H>  /  DBili  x   /  AST  405<H>  /  ALT  130<H>  /  AlkPhos  651<H>  08-31    LIVER FUNCTIONS - ( 31 Aug 2023 04:50 )  Alb: 3.8 g/dL / Pro: 7.0 g/dL / ALK PHOS: 651 U/L / ALT: 130 U/L / AST: 405 U/L / GGT: x           PT/INR - ( 30 Aug 2023 17:00 )   PT: 12.9 sec;   INR: 1.15 ratio      PTT - ( 30 Aug 2023 17:00 )  PTT:31.8 sec  Urinalysis Basic - ( 31 Aug 2023 04:50 )    Color: x / Appearance: x / SG: x / pH: x  Gluc: 82 mg/dL / Ketone: x  / Bili: x / Urobili: x   Blood: x / Protein: x / Nitrite: x   Leuk Esterase: x / RBC: x / WBC x   Sq Epi: x / Non Sq Epi: x / Bacteria: x                          11.7   8.23  )-----------( 172      ( 31 Aug 2023 04:50 )             36.6                         12.4   10.25 )-----------( 196      ( 29 Aug 2023 21:25 )             36.6       Imaging:    ACC: [id number] EXAM:  CT ABDOMEN AND PELVIS   ORDERED BY: ZE EASTON     PROCEDURE DATE:  08/29/2023          INTERPRETATION:  CLINICAL INFORMATION: Acute kidney injury. History of   metastatic cancer with unknown primary.    COMPARISON: MR abdomen/pelvis 8/15/2023, CT abdomen and pelvis 8/8/2023    CONTRAST/COMPLICATIONS:  IV Contrast: None  Oral Contrast: None  Complications: None reported.    PROCEDURE:  CT of the Abdomen and Pelvis was performed.  Sagittal and coronal reformats were performed.    FINDINGS:  Evaluation of the solid visceral organs and vasculature is limited   without the administration of intravenous contrast.    LOWER CHEST: Coronary artery calcifications. Left gynecomastia.    LIVER: Hepatomegaly measuring 24 cm in craniocaudal dimension.   Innumerable hypodense lesions, suspicious for metastatic disease.  BILE DUCTS: Normal caliber.  GALLBLADDER: Contracted.  SPLEEN: Within normal limits.  PANCREAS: Within normal limits.  ADRENALS: Nonspecific left adrenal gland thickening.  KIDNEYS/URETERS: No hydronephrosis or stone.    BLADDER: Layering hyperdense focus along the right posterior wall is   again seen, likely layering stones.  REPRODUCTIVE ORGANS: Prostatomegaly.    BOWEL: No bowel obstruction. The appendix is not visualized.  PERITONEUM: Trace pelvic ascites.  VESSELS: Atherosclerotic changes.  RETROPERITONEUM/LYMPH NODES: No lymphadenopathy.  ABDOMINAL WALL: Right inguinal hernia repair. Mild anasarca.  BONES: Degenerative changes. No focal osseous lesion.    IMPRESSION:  No hydronephrosis or stone.  Redemonstration of hepatomegaly with presumed innumerable hepatic   metastatic lesions.  Trace ascites and mild anasarca.    --- End of Report ---    MELANIE OLIVER MD; Resident Radiologist  This document has been electronically signed.  KEYLA PEREZ MD; Attending Radiologist  This document has been electronically signed. Aug 30 2023  9:18AM

## 2023-08-31 NOTE — DISCHARGE NOTE PROVIDER - CARE PROVIDER_API CALL
Milagros Sol  Hematology/Oncology  [address] [place] [id number]  Phone: [phone]  Fax: [phone]  Follow Up Time:

## 2023-08-31 NOTE — PROGRESS NOTE ADULT - PROBLEM SELECTOR PLAN 2
- Known liver mets (vs primary tumor?) s/p biopsy with IR outpatient 8/29. Will f/u results   - CTAP w/ hepatomegaly with presumed innumerable hepatic   metastatic lesions, trace ascites, mild anasarca  - Oncology consulted: check AFP tumor marker, , CEA, PT/PTT/fibrinogen/d-dimer, PSA, viral hepatitis, LDH, uric acid  - Reached out to GI about utility of repeat MRCP; not necessary at this time  - PSA 3.5 in April 2023, recent colonoscopy several years ago w/ benign polyps - Known liver mets (vs primary tumor?) s/p biopsy with IR outpatient 8/29. Will f/u results - onc will email to expedite results   - CTAP on admission w/ hepatomegaly with presumed innumerable hepatic metastatic lesions, trace ascites, mild anasarca  - Oncology consulted: checking AFP tumor marker, , CEA, PT/PTT/fibrinogen/d-dimer, PSA, viral hepatitis, LDH, uric acid  - Concern for possible biliary obstruction 2/2 rapidly elevated TBili and AlkPhos compared to earlier this month; consulted GI this AM  - Per GI repeat MRCP not necessary at this time  - PSA 3.5 in April 2023, recent colonoscopy several years ago w/ benign polyps

## 2023-08-31 NOTE — SWALLOW VFSS/MBS ASSESSMENT ADULT - COMMENTS
Progress Note- Internal Medicine 8/31: "Pt is an 83 y.o. M w/ PMHx HTN, BPH, recent findings of metastatic cancer to lung, liver, and pelvic bone w/ unclear primary source at this point, present with severe generalized weakness ISO electrolyte derangements including hyponatremia and hyperkalemia along w/ GREG, most likely due to pre-renal cause secondary to poor po intake. This AM pt feeling well, hyponatremia improving *** repeat BMP w/ BUN:Cr ratio >27. 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 Pinon [place]),  bladder calculus s/p cystoscopic radha lithotripsy on 8/31/20; and newly findings of suspected liver metastatic lesions on  image who presents w/ hyperkalemia and GREG seen on outpatient lab work during scheduled IR office visit (for liver biopsy) earlier 8/29/23." (see full note for details)     Of note, patient seen for a clinical swallow evaluation on 8/30 with recommendations of Regular Solids with Thin Liquids and a Cinesophagram. (see report for full details)     Patient received in Radiology Suite this AM for a Cinesophagram. Patient transferred to a specialized seating unit with lateral view projection.

## 2023-08-31 NOTE — DISCHARGE NOTE PROVIDER - NSDCCPCAREPLAN_GEN_ALL_CORE_FT
PRINCIPAL DISCHARGE DIAGNOSIS  Diagnosis: Adult failure to thrive  Assessment and Plan of Treatment: When you came to the hospital, you were feeling extemely weak and had lost a considerable amount of weight. Please continue taking Remeron to try to increase your appetite. Please attempt eating high calorie foods that you enjoy.      SECONDARY DISCHARGE DIAGNOSES  Diagnosis: Liver lesion  Assessment and Plan of Treatment: When you came to the hospital, there had been findings of liver lesions on imaging studies, and you had just gotten a liver biopsy done, which showed ***. Please attend your scheduled consultation with Dr. Milagros Sol to determine the best workup plan and management for your condition.     PRINCIPAL DISCHARGE DIAGNOSIS  Diagnosis: Liver lesion  Assessment and Plan of Treatment: When you came to the hospital, there had been findings of liver lesions on imaging studies, and you had just gotten a liver biopsy done, which showed poorly differentiated neuroendocrine tumor. Metastatic disease was found in your liver, pelvis and possibly lungs. There was compression of your bile ducts and you had an endoscopic procedure to open up the compression. The procedure occured without complication and your bile numbers decreased. You were offered chemotherapy by inpatient oncology. Please continue with prescribed pain regimen.   Please attend your scheduled consultation with Dr. Milagros Sol if you consider outpatient chemotherapy.      SECONDARY DISCHARGE DIAGNOSES  Diagnosis: Adult failure to thrive  Assessment and Plan of Treatment: When you came to the hospital, you were feeling extremly weak and had lost of considerable amount of weigh . Please continue to take Remeron to try to increase your appetite. Please try eating high calorie foods that you enjoy. Take small bits and drink protein shakes as tolerated.     PRINCIPAL DISCHARGE DIAGNOSIS  Diagnosis: Liver lesion  Assessment and Plan of Treatment: When you came to the hospital, there had been findings of liver lesions on imaging studies, and you had just gotten a liver biopsy done, which showed poorly differentiated neuroendocrine tumor. Metastatic disease was found in your liver, pelvis and possibly lungs. There was compression of your bile ducts and you had an endoscopic procedure to open up the compression. The procedure occured without complication and your bile numbers decreased. You were offered chemotherapy by inpatient oncology.   Please attend your scheduled consultation with Dr. Mliagros Sol if you consider outpatient chemotherapy.      SECONDARY DISCHARGE DIAGNOSES  Diagnosis: Adult failure to thrive  Assessment and Plan of Treatment: When you came to the hospital, you were feeling extremly weak and had lost of considerable amount of weigh . Please continue to take Remeron to try to increase your appetite. Please try eating high calorie foods that you enjoy. Take small bits and drink protein shakes as tolerated. You were also found to have a yeast infection in your throat which may be causing some pain while swallowing. Please continue your fluconazole antifungal for the complete seven days.

## 2023-08-31 NOTE — DIETITIAN INITIAL EVALUATION ADULT - SIGNS/SYMPTOMS
<75% nutritional needs >1 month, severe loss of muscle mass and fat stores. <75% nutritional needs >1 month, 18.4% wt. loss in 3 mos. severe loss of muscle mass and fat stores.

## 2023-08-31 NOTE — DIETITIAN INITIAL EVALUATION ADULT - NS FNS DIET ORDER
Diet, Regular:   Minced and Moist (MINCEDMOIST)  Mildly Thick Liquids (MILDTHICKLIQS)  Supplement Feeding Modality:  Oral  Ensure Enlive Cans or Servings Per Day:  2       Frequency:  Three Times a day (08-31-23 @ 11:08)

## 2023-08-31 NOTE — DIETITIAN NUTRITION RISK NOTIFICATION - TREATMENT: THE FOLLOWING DIET HAS BEEN RECOMMENDED
Diet, Regular:   Minced and Moist (MINCEDMOIST)  Mildly Thick Liquids (MILDTHICKLIQS)  Supplement Feeding Modality:  Oral  Ensure Enlive Cans or Servings Per Day:  2       Frequency:  Three Times a day (08-31-23 @ 11:08) [Active]

## 2023-08-31 NOTE — CONSULT NOTE ADULT - ASSESSMENT
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 Pinon [place]), bladder calculus s/p cystoscopic radha lithotripsy on 8/31/20;   and new findings of liver lesions on images who presents w/ hyperkalemia and GREG seen on outpatient lab work completed during scheduled IR liver biopsy earlier 8/29/23. Gi consulted for elevated bilirubin.    #Elevated bilirubin: likely 2/2 diffuse hepatic lesions vs biliary obstruction  - last MR 8/15 w/ hepatosplenomegaly with diffuse metastatic nodules throughout both lobes measuring up to 2.8 x 2.6 cm in the right inferior lobe, normal caliber ducts  #Low po intake: Denies dysphagia or odynophagia, though endorses difficulty initiating swallow. Symptoms more suggestive of oropharyngeal dysphagia. May also have component of food avoidance due to pain. No nausea, vomiting.    Recommendations  - trend CMP  - recommend obtaining MRCP for better characterization of bile ducts  - nutrition consult   - family expresses significant concerns re malnutrition, can consider NGT placement if patient is not meeting his caloric needs    All recommendations are tentative until note is attested by attending.     Jillian Barajas, PGY5  Gastroenterology/Hepatology Fellow  Available on [employer] Teams  [phone] ([facility] Short Range Pager)  [phone] (Long Range Pager)    After 5pm, please contact the on-call GI fellow. [phone]

## 2023-08-31 NOTE — DISCHARGE NOTE PROVIDER - HOSPITAL COURSE
Patient is an 83 y.o. 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 For the past 1-2 months, the patient had been experiencing early satiety, extreme fatigue, loss of appetite, vague abdominal pain, and unintentional 10-15 lb weight loss. He went to the [facility] ED on 8/8 where CTAP demonstrated liver lesions and pulmonary nodules and he as told to follow up with MRI, which confirmed these findings as well as bony metastases to the pelvis. The patient then went to  for a liver biopsy on 8/29 where these lab abnormalities were discovered, and the patient disclosed that he was too concerned to go directly home because of his severe weakness and overall feeling poorly, so he was directly admitted to medicine at [facility]. The hyperkalemia resolved on its own, and there was no evidence of urinary obstruction on CT or from patient's presentation as he was voiding freely, so GREG was presumed to be due to pre-renal causes of dehydration and poor po intake; IV fluids were administered.     The patient also mentioned feeling a choking sensation upon the first bite/sip of food and drink so he was evaluated by speech+swallow and also underwent cineesophagogram, which demonstrated silent aspiration, and a minced and moist diet was recommended.     Oncology was consulted for further recs and management. They recommended further workup inpatient to determine the primary tumor source. Noteworthy lab findings include , uric acid 12.9, d-dimer 5418    There was concern for biliary obstruction due to rise in patient's bilirubin to 3.7 from1.0 on 8/8, AlkPhos to 651 from 397, AST to 405 from 210; ALT remained largely stable at 130 from 125.    The patient has an appointment scheduled with Dr. Sol on 9/7 (hepatobiliary medical onc). Patient is an 83 y.o. 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 For the past 1-2 months, the patient had been experiencing early satiety, extreme fatigue, loss of appetite, vague abdominal pain, and unintentional 10-15 lb weight loss. He went to the [facility] ED on 8/8 where CTAP demonstrated liver lesions and pulmonary nodules and he as told to follow up with MRI, which confirmed these findings as well as bony metastases to the pelvis. The patient then went to  for a liver biopsy on 8/29 where these lab abnormalities were discovered, and the patient disclosed that he was too concerned to go directly home because of his severe weakness and overall feeling poorly, so he was directly admitted to medicine at [facility]. The hyperkalemia resolved w/ Lokelma, and there was no evidence of urinary obstruction on CT or from patient's presentation as he was voiding freely, so GREG was presumed to be due to pre-renal causes of dehydration and poor po intake; IV fluids were administered.     The patient also mentioned feeling a choking sensation upon the first bite/sip of food and drink so he was evaluated by speech+swallow and also underwent cineesophagogram, which demonstrated silent aspiration, and a minced and moist diet was recommended. However due to patient preferences and expressed understanding of the risks, his diet was switched back on 9/3 to regular in attempt to encourage as much caloric intake as possible. The patient and his family initially wanted to trial an NGT as they felt he was not meeting his nutritional needs through po intake. Nutrition team was consulted. A discussion was had where the risks of NGT (infection, aspiration) were discussed, and it was emphasized that this would be a temporizing measure, as the patient could not leave the hospital with an NGT in place.     Oncology was consulted for further recs and management. They recommended further workup inpatient to determine the primary tumor source. Noteworthy lab findings include , uric acid 12.9, d-dimer 5418    There was concern for biliary obstruction due to rise in patient's bilirubin from 1.0 on 8/8 to as high as 4.6 ***. GI was consulted and recommended repeat MRI/MRCP, which demonstrated unchanged bilobar hepatic lesions as well as slight compression of the common bile duct 2/2 hepatomegaly.     The patient has an appointment scheduled with Dr. Sol on 9/7 (hepatobiliary medical onc). Patient is an 83 y.o. 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 For the past 1-2 months, the patient had been experiencing early satiety, extreme fatigue, loss of appetite, vague abdominal pain, and unintentional 10-15 lb weight loss. He went to the [facility] ED on 8/8 where CTAP demonstrated liver lesions and pulmonary nodules and he as told to follow up with MRI, which confirmed these findings as well as bony metastases to the pelvis. The patient then went to  for a liver biopsy on 8/29 where these lab abnormalities were discovered, and the patient disclosed that he was too concerned to go directly home because of his severe weakness and overall feeling poorly, so he was directly admitted to medicine at [facility]. The hyperkalemia resolved w/ Lokelma, and there was no evidence of urinary obstruction on CT or from patient's presentation as he was voiding freely, so GREG was presumed to be due to pre-renal causes of dehydration and poor po intake; IV fluids were administered.     The patient also mentioned feeling a choking sensation upon the first bite/sip of food and drink so he was evaluated by speech+swallow and also underwent cineesophagogram, which demonstrated silent aspiration, and a minced and moist diet was recommended. However due to patient preferences and expressed understanding of the risks, his diet was switched back on 9/3 to regular in attempt to encourage as much caloric intake as possible. The patient and his family initially wanted to trial an NGT as they felt he was not meeting his nutritional needs through po intake. Nutrition team was consulted. A discussion was had where the risks of NGT (infection, aspiration) were discussed, and it was emphasized that this would be a temporizing measure, as the patient could not leave the hospital with an NGT in place.     Oncology was consulted for further recs and management. They recommended further workup inpatient to determine the primary tumor source. Noteworthy lab findings include , uric acid 12.9, d-dimer 5418    There was concern for biliary obstruction due to rise in patient's bilirubin from 1.0 on 9/6 to as high as 6.6. GI was consulted and recommended repeat MRI/MRCP, which demonstrated unchanged bilobar hepatic lesions as well as slight compression of the common bile duct 2/2 hepatomegaly. [facility] performed ERCP with Stent placed on 9/6.   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 A stent was placed.   - Pending biopsies of     The patient has an appointment scheduled with Dr. Sol on 9/7 (hepatobiliary medical onc). Patient is an 83 y.o. 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 For the past 1-2 months, the patient had been experiencing early satiety, extreme fatigue, loss of appetite, vague abdominal pain, and unintentional 10-15 lb weight loss. 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 The patient then went to  for a liver biopsy on 8/29 where these lab abnormalities were discovered, and the patient disclosed that he was too concerned to go directly home because of his severe weakness and overall feeling poorly, so he was directly admitted to medicine at [facility]. The hyperkalemia resolved w/ Lokelma, and there was no evidence of urinary obstruction on CT or from patient's presentation as he was voiding freely, so GREG was presumed to be due to pre-renal causes of dehydration and poor po intake; IV fluids were administered.     The patient also mentioned feeling a choking sensation upon the first bite/sip of food and drink so he was evaluated by speech+swallow and also underwent cineesophagogram, which demonstrated silent aspiration, and a minced and moist diet was recommended. However due to patient preferences and expressed understanding of the risks, his diet was switched back on 9/3 to regular in attempt to encourage as much caloric intake as possible. The patient and his family initially wanted to trial an NGT as they felt he was not meeting his nutritional needs through po intake. Nutrition team was consulted. A discussion was had where the risks of NGT (infection, aspiration) were discussed, and it was emphasized that this would be a temporizing measure, as the patient could not leave the hospital with an NGT in place.     Oncology was consulted for further recs and management. They recommended further workup inpatient to determine the primary tumor source. Noteworthy lab findings include , uric acid 12.9, d-dimer 5418  There was concern for biliary obstruction due to rise in patient's bilirubin from 1.0 on 9/6 to as high as 6.6. GI was consulted and recommended repeat MRI/MRCP, which demonstrated unchanged bilobar hepatic lesions as well as slight compression of the common bile duct 2/2 hepatomegaly. [facility] performed ERCP with Stent placed on 9/6.   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 A stent was placed. Patient also came in with a possible CKD (unknown baseline). After his ERCP, patient developed worsening GREG (Cr from 1.5 to 1.9). in the setting of patient hypotension and poor oral intake, a pre-renal etiology was suspect. He was started on IV fluids, albumin and midodrine for BP support.     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 Inpatient oncology spoke with patient and family, and after accessing the risk and benefits of inpatient chemotherapy, patient decided he did not want to pursue further chemotherapy treatment for his metastatic cancer.   Patient was accepted into [facility], with referral to hospice.     The patient has an appointment scheduled with Dr. Sol on 9/7 (hepatobiliary medical onc). Patient is an 83 y.o. 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 For the past 1-2 months, the patient had been experiencing early satiety, extreme fatigue, loss of appetite, vague abdominal pain, and unintentional 10-15 lb weight loss. He went to the [facility] ED on 8/8 where CTAP demonstrated liver lesions and pulmonary nodules and he as told to follow up with MRI, which confirmed these findings as well as bony metastases to the pelvis. The patient then went to  for a liver biopsy on 8/29 where these lab abnormalities were discovered, and the patient disclosed that he was too concerned to go directly home because of his severe weakness and overall feeling poorly, so he was directly admitted to medicine at [facility]. The hyperkalemia resolved w/ Lokelma, and there was no evidence of urinary obstruction on CT or from patient's presentation as he was voiding freely, so GREG was presumed to be due to pre-renal causes of dehydration and poor po intake; IV fluids were administered.     The patient also mentioned feeling a choking sensation upon the first bite/sip of food and drink so he was evaluated by speech+swallow and also underwent cineesophagogram, which demonstrated silent aspiration, and a minced and moist diet was recommended. However due to patient preferences and expressed understanding of the risks, his diet was switched back on 9/3 to regular in attempt to encourage as much caloric intake as possible. The patient and his family initially wanted to trial an NGT as they felt he was not meeting his nutritional needs through po intake. Nutrition team was consulted. A discussion was had where the risks of NGT (infection, aspiration) were discussed, and it was emphasized that this would be a temporizing measure, as the patient could not leave the hospital with an NGT in place.     Oncology was consulted for further recs and management. They recommended further workup inpatient to determine the primary tumor source. Noteworthy lab findings include , uric acid 12.9, d-dimer 5418  There was concern for biliary obstruction due to rise in patient's bilirubin from 1.0 on 9/6 to as high as 6.6. GI was consulted and recommended repeat MRI/MRCP, which demonstrated unchanged bilobar hepatic lesions as well as slight compression of the common bile duct 2/2 hepatomegaly. [facility] performed ERCP with Stent placed on 9/6.   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 A stent was placed. Patient also came in with a possible CKD (unknown baseline). After his ERCP, patient developed worsening GREG (Cr from 1.5 to 1.9). in the setting of patient hypotension and poor oral intake, a pre-renal etiology was suspect. He was started on IV fluids, albumin and midodrine for BP support.     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 Inpatient oncology spoke with patient and family, and after accessing the risk and benefits of inpatient chemotherapy, patient decided he did not want to pursue further chemotherapy treatment for his metastatic cancer.   Patient was accepted into [facility], with referral to hospice.     The patient has an appointment scheduled with Dr. Sol on 9/7 (hepatobiliary medical onc).

## 2023-08-31 NOTE — DIETITIAN INITIAL EVALUATION ADULT - PROBLEM SELECTOR PLAN 2
- K 5.7 on outpatient labs 8/29  - Repeat BMP pending  - Consider lokelma if still elevated   - Continuous telemetry

## 2023-08-31 NOTE — SWALLOW VFSS/MBS ASSESSMENT ADULT - ADDITIONAL RECOMMENDATIONS
This department to follow up as schedule permits for swallow therapy. Medical team further advised to reconsult if there is a change in medical status and/or observed change in patient's tolerance of recommended PO diet. Patient will benefit swallow therapy pending discharge planning (e.g. Homecare vs. Rehab vs. [facility] [phone])

## 2023-08-31 NOTE — DIETITIAN INITIAL EVALUATION ADULT - PROBLEM SELECTOR PLAN 3
- BUN/Cr 47/1.95  - Pre-renal ISO poor po intake vs post-renal due to obstruction (less likely as pt reports voiding well on own, but will assess on CTAP)  - LR maintenance  - Avoid nephrotoxins, renally dose meds

## 2023-08-31 NOTE — DISCHARGE NOTE PROVIDER - ATTENDING DISCHARGE PHYSICAL EXAMINATION:
CONSTITUTIONAL: NAD, cachectic, pleasant  RESPIRATORY: Normal respiratory effort; no respiratory distress, CTAB  CARDIOVASCULAR: No visible JVD, No lower extremity edema; S1S2, no m,r,g  ABDOMEN: Not guarding, does not appear distended, BS+  MUSCLOSKELETAL: no clubbing or cyanosis of digits; no joint swelling, LE edema  PSYCH: AOx3    Patient and son at bedside, agree to SNF w/transition to hospice care  Peer to Peer to obtained prior to dc

## 2023-08-31 NOTE — SWALLOW VFSS/MBS ASSESSMENT ADULT - DEMONSTRATES NEED FOR REFERRAL TO ANOTHER SERVICE
Consider Neurology consult given unknown etiology of dysphagia with sensory deficit component (Silent Aspiration) at MD's discretion/neurology Consider Neurology consult given unknown etiology of dysphagia with sensory deficit component (Silent Aspiration) at MD's discretion

## 2023-08-31 NOTE — SWALLOW VFSS/MBS ASSESSMENT ADULT - RECOMMENDED CONSISTENCY
1. Minced and Moist Solids and Mildly Thick Liquids   2. Swallowing Guidelines: Seated Upright during Mealtimes; Slow Pacing; Alternate Between Minced and Moist and Mildly Thick Liquids; Minced and Moist via TEASPOON; Single/Small Sips of Mildly Thick Liquids; Reswallow (2-3) After Each Presentation Prior to Next Presentation; Maintain Oral Hygiene  3. Aspiration and Reflux Precautions 1. Minced and Moist Solids and Mildly Thick Liquids   2. Swallowing Guidelines: Seated Upright during Mealtimes; Slow Pacing; Alternate Between Minced and Moist and Mildly Thick Liquids; Single/Small Sips of Mildly Thick Liquids; Reswallow (2-3) After Each Presentation Prior to Next Presentation; Maintain Oral Hygiene  3. Aspiration and Reflux Precautions

## 2023-08-31 NOTE — DIETITIAN INITIAL EVALUATION ADULT - OTHER INFO
83 y.o. M w/ PMHx HTN, BPH, recent findings of metastatic cancer to lung, liver, and pelvic bone w/ unclear primary source at this point, present with severe generalized weakness ISO electrolyte derangements including hyponatremia and hyperkalemia along w/ GREG, most likely due to pre-renal cause secondary to poor po intake. This AM pt feeling well, hyponatremia improving. CTAP on admission w/ hepatomegaly with presumed innumerable hepatic metastatic lesions, trace ascites, mild anasarca.     Pt seen and reports 50% intake of meals and c/o diarrhea last night but improved today. As per SLP recommendation (8/31/23)-Minced and Moist Solids and Mildly Thick Liquids. Will add supplement Hormel vital shake x3/day with thickener packets. UBW- 130# per pt 3 months ago. Current wt- 106.7# (8/29/23). Noted wt loss 23#/17.6% in 3 months. Pt with signs of muscle wasting/fat loss upon visualization.

## 2023-09-01 DIAGNOSIS — E79.0 HYPERURICEMIA WITHOUT SIGNS OF INFLAMMATORY ARTHRITIS AND TOPHACEOUS DISEASE: ICD-10-CM

## 2023-09-01 LAB
AFP-TM SERPL-MCNC: 3.8 NG/ML — SIGNIFICANT CHANGE UP
ALBUMIN SERPL ELPH-MCNC: 3.6 G/DL — SIGNIFICANT CHANGE UP (ref 3.3–5)
ALP SERPL-CCNC: 647 U/L — HIGH (ref 40–120)
ALT FLD-CCNC: 131 U/L — HIGH (ref 4–41)
ANION GAP SERPL CALC-SCNC: 12 MMOL/L — SIGNIFICANT CHANGE UP (ref 7–14)
AST SERPL-CCNC: 430 U/L — HIGH (ref 4–40)
BASOPHILS # BLD AUTO: 0.02 K/UL — SIGNIFICANT CHANGE UP (ref 0–0.2)
BASOPHILS NFR BLD AUTO: 0.2 % — SIGNIFICANT CHANGE UP (ref 0–2)
BILIRUB SERPL-MCNC: 3.9 MG/DL — HIGH (ref 0.2–1.2)
BUN SERPL-MCNC: 46 MG/DL — HIGH (ref 7–23)
CALCIUM SERPL-MCNC: 10.2 MG/DL — SIGNIFICANT CHANGE UP (ref 8.4–10.5)
CANCER AG19-9 SERPL-ACNC: <2 U/ML — SIGNIFICANT CHANGE UP
CHLORIDE SERPL-SCNC: 96 MMOL/L — LOW (ref 98–107)
CO2 SERPL-SCNC: 24 MMOL/L — SIGNIFICANT CHANGE UP (ref 22–31)
CREAT SERPL-MCNC: 1.67 MG/DL — HIGH (ref 0.5–1.3)
EGFR: 41 ML/MIN/1.73M2 — LOW
EOSINOPHIL # BLD AUTO: 0.06 K/UL — SIGNIFICANT CHANGE UP (ref 0–0.5)
EOSINOPHIL NFR BLD AUTO: 0.7 % — SIGNIFICANT CHANGE UP (ref 0–6)
GLUCOSE SERPL-MCNC: 85 MG/DL — SIGNIFICANT CHANGE UP (ref 70–99)
HAV IGM SER-ACNC: SIGNIFICANT CHANGE UP
HBV CORE IGM SER-ACNC: SIGNIFICANT CHANGE UP
HBV SURFACE AG SER-ACNC: SIGNIFICANT CHANGE UP
HCT VFR BLD CALC: 32.6 % — LOW (ref 39–50)
HCV AB S/CO SERPL IA: 0.08 S/CO — SIGNIFICANT CHANGE UP (ref 0–0.99)
HCV AB SERPL-IMP: SIGNIFICANT CHANGE UP
HGB BLD-MCNC: 11.2 G/DL — LOW (ref 13–17)
IANC: 6.86 K/UL — SIGNIFICANT CHANGE UP (ref 1.8–7.4)
IMM GRANULOCYTES NFR BLD AUTO: 0.5 % — SIGNIFICANT CHANGE UP (ref 0–0.9)
LDH PNL SERPL: 621 IU/L — HIGH (ref 121–224)
LDH1 SERPL-CCNC: 18 % — SIGNIFICANT CHANGE UP (ref 17–32)
LDH3 SERPL-CCNC: 22 % — SIGNIFICANT CHANGE UP (ref 17–27)
LDH3 SERPL-CCNC: 29 % — SIGNIFICANT CHANGE UP (ref 25–40)
LDH4 SERPL-CCNC: 15 % — HIGH (ref 5–13)
LDH5 SERPL-CCNC: 16 % — SIGNIFICANT CHANGE UP (ref 4–20)
LYMPHOCYTES # BLD AUTO: 0.6 K/UL — LOW (ref 1–3.3)
LYMPHOCYTES # BLD AUTO: 7.2 % — LOW (ref 13–44)
MAGNESIUM SERPL-MCNC: 2.2 MG/DL — SIGNIFICANT CHANGE UP (ref 1.6–2.6)
MCHC RBC-ENTMCNC: 32.1 PG — SIGNIFICANT CHANGE UP (ref 27–34)
MCHC RBC-ENTMCNC: 34.4 GM/DL — SIGNIFICANT CHANGE UP (ref 32–36)
MCV RBC AUTO: 93.4 FL — SIGNIFICANT CHANGE UP (ref 80–100)
MONOCYTES # BLD AUTO: 0.74 K/UL — SIGNIFICANT CHANGE UP (ref 0–0.9)
MONOCYTES NFR BLD AUTO: 8.9 % — SIGNIFICANT CHANGE UP (ref 2–14)
NEUTROPHILS # BLD AUTO: 6.86 K/UL — SIGNIFICANT CHANGE UP (ref 1.8–7.4)
NEUTROPHILS NFR BLD AUTO: 82.5 % — HIGH (ref 43–77)
NRBC # BLD: 0 /100 WBCS — SIGNIFICANT CHANGE UP (ref 0–0)
NRBC # FLD: 0 K/UL — SIGNIFICANT CHANGE UP (ref 0–0)
PHOSPHATE SERPL-MCNC: 2.6 MG/DL — SIGNIFICANT CHANGE UP (ref 2.5–4.5)
PLATELET # BLD AUTO: 186 K/UL — SIGNIFICANT CHANGE UP (ref 150–400)
POTASSIUM SERPL-MCNC: 5.2 MMOL/L — SIGNIFICANT CHANGE UP (ref 3.5–5.3)
POTASSIUM SERPL-SCNC: 5.2 MMOL/L — SIGNIFICANT CHANGE UP (ref 3.5–5.3)
PROT SERPL-MCNC: 6.7 G/DL — SIGNIFICANT CHANGE UP (ref 6–8.3)
PSA FLD-MCNC: 3.47 NG/ML — SIGNIFICANT CHANGE UP (ref 0–4)
RBC # BLD: 3.49 M/UL — LOW (ref 4.2–5.8)
RBC # FLD: 13.8 % — SIGNIFICANT CHANGE UP (ref 10.3–14.5)
SODIUM SERPL-SCNC: 132 MMOL/L — LOW (ref 135–145)
WBC # BLD: 8.32 K/UL — SIGNIFICANT CHANGE UP (ref 3.8–10.5)
WBC # FLD AUTO: 8.32 K/UL — SIGNIFICANT CHANGE UP (ref 3.8–10.5)

## 2023-09-01 PROCEDURE — 99232 SBSQ HOSP IP/OBS MODERATE 35: CPT | Mod: GC

## 2023-09-01 PROCEDURE — 74181 MRI ABDOMEN W/O CONTRAST: CPT | Mod: 26

## 2023-09-01 PROCEDURE — 99233 SBSQ HOSP IP/OBS HIGH 50: CPT | Mod: GC

## 2023-09-01 RX ORDER — SODIUM CHLORIDE 9 MG/ML
1000 INJECTION, SOLUTION INTRAVENOUS
Refills: 0 | Status: DISCONTINUED | OUTPATIENT
Start: 2023-09-01 | End: 2023-09-03

## 2023-09-01 RX ORDER — MIRTAZAPINE 45 MG/1
15 TABLET, ORALLY DISINTEGRATING ORAL AT BEDTIME
Refills: 0 | Status: DISCONTINUED | OUTPATIENT
Start: 2023-09-01 | End: 2023-09-14

## 2023-09-01 RX ADMIN — MIRTAZAPINE 15 MILLIGRAM(S): 45 TABLET, ORALLY DISINTEGRATING ORAL at 21:42

## 2023-09-01 RX ADMIN — HEPARIN SODIUM 5000 UNIT(S): 5000 INJECTION INTRAVENOUS; SUBCUTANEOUS at 05:09

## 2023-09-01 RX ADMIN — Medication 650 MILLIGRAM(S): at 18:43

## 2023-09-01 RX ADMIN — Medication 100 MILLIGRAM(S): at 12:15

## 2023-09-01 RX ADMIN — Medication 650 MILLIGRAM(S): at 02:47

## 2023-09-01 RX ADMIN — HEPARIN SODIUM 5000 UNIT(S): 5000 INJECTION INTRAVENOUS; SUBCUTANEOUS at 17:43

## 2023-09-01 RX ADMIN — SODIUM CHLORIDE 75 MILLILITER(S): 9 INJECTION, SOLUTION INTRAVENOUS at 05:09

## 2023-09-01 RX ADMIN — Medication 650 MILLIGRAM(S): at 03:47

## 2023-09-01 RX ADMIN — Medication 650 MILLIGRAM(S): at 19:28

## 2023-09-01 NOTE — CHART NOTE - NSCHARTNOTEFT_GEN_A_CORE
Case discussed with medical attending, Dr. Oro. Patient is pending biopsy results for suspected malignancy. Family concerned about patient's nutritional status but patient still has PO route and medical team starting appetite stimulant to address family's concerns. Continue medical management and goc discussions with medical team and oncology team pending hospital course.    Palliative team will hold off on official consult for now.     Bernadine Mercedes D.O.   Palliative Medicine

## 2023-09-01 NOTE — PROGRESS NOTE ADULT - PROBLEM SELECTOR PLAN 3
- BUN/Cr 48/1.81, likely pre-renal ISO poor po intake   - S/p LR maintenance, BUN/ Cr stable  - Avoid nephrotoxins, renally dose meds - BUN/Cr 46/1.67, likely pre-renal ISO poor po intake. Cr improved from 1.81 --> 1.67  - Getting LR 75 cc/hr, BUN/ Cr stable  - Avoid nephrotoxins, renally dose meds

## 2023-09-01 NOTE — PROGRESS NOTE ADULT - ASSESSMENT
Pt is an 83 y.o. M w/ PMHx HTN, BPH, recent findings of metastatic cancer to lung, liver, and pelvic bone w/ unclear primary source at this point, present with severe generalized weakness ISO electrolyte derangements including hyponatremia and hyperkalemia along w/ GREG, most likely due to pre-renal cause secondary to poor po intake. This AM pt feeling well, hyponatremia improving, GREG stable w/ BUN:Cr ratio >27. Oncology consulted for management recommendations; workup ongoing. GI consulted for possible biliary obstruction w/ rising TBili  Pt is an 83 y.o. M w/ PMHx HTN, BPH, recent findings of metastatic cancer to lung, liver, and pelvic bone w/ unclear primary source at this point, present with severe generalized weakness ISO electrolyte derangements including hyponatremia and hyperkalemia along w/ GREG, most likely due to pre-renal cause secondary to poor po intake. This AM pt feeling well, hyponatremia improving, GREG stable w/ BUN:Cr ratio >27. Oncology consulted for management recommendations; workup ongoing. GI consulted for possible biliary obstruction, recommending MRCP.  Pt is an 83 y.o. M w/ PMHx HTN, BPH, recent findings of metastatic cancer to lung, liver, and pelvic bone w/ unclear primary source at this point, present with severe generalized weakness ISO electrolyte derangements including hyponatremia and hyperkalemia along w/ GREG, most likely due to pre-renal cause secondary to poor po intake. Today 9/1, hyponatremia and GREG continuing to improve. Patient endorsing fatigue and weakness, early satiety and lack of appetite; family concerned about nutrition and mentioned NGT. Oncology consulted for management recommendations; workup ongoing. GI consulted for possible biliary obstruction, recommending MRCP, pending.

## 2023-09-01 NOTE — CHART NOTE - NSCHARTNOTEFT_GEN_A_CORE
Patient and family requesting to speak w/ RD. Saw patient w/ slade Pina at bedside and Rey via phone. Rey expressing concern about patients nutritional status and believes patient is not going to consume enough nutrition at this point via PO. Reports patient has concerns about his swallowing and is reluctant to consume food because of it. Confirms patient w/ 20 lbs. weight loss over the past month. Both sons and patient expressed they want to trial NGT feeds and will want it done as soon as possible to improve nutritional status. Spoke w/ medical team regarding case. Refer to recommendation below. Nutrition remains available for reconsult as needed. Will continue to monitor and f/u.    Recommendation  - Jevity 1.5 @ 45 mL/hr. x 24 hrs. for total volume 1,080 mL. Provides 1,620 kcal, 69 g pro and 821 mL of fluid (TF free water), defer additional flushes to team. Begin tube feeds @ 5 mL/hr. and increase by 10 mL/hr. every 6 hrs. to goal rate @ 45 mL/hr.  - monitor electrolytes (potassium, magnesium, phosphorus)    Jose Mcdaniel, [id number] or TEAMS Patient and family requesting to speak w/ RD. Saw patient w/ sons Yovani at bedside and Rey via phone. Rey expressing concern about patients nutritional status and believes patient is not going to consume enough nutrition at this point via PO. Reports patient has concerns about his swallowing and is reluctant to consume food because of it. Confirms patient w/ 20 lbs. weight loss over the past month. Both sons and patient expressed they want to trial NGT feeds and will want it done as soon as possible to improve nutritional status. Spoke w/ medical team regarding case. Refer to recommendation below. Nutrition remains available for reconsult as needed. Will continue to monitor and f/u.    Recommendation  - Jevity 1.5 @ 45 mL/hr. x 24 hrs. for total volume 1,080 mL. Provides 1,620 kcal, 69 g pro and 821 mL of fluid (TF free water), defer additional flushes to team. Begin tube feeds @ 5 mL/hr. and increase by 10 mL/hr. every 6 hrs. to goal rate @ 45 mL/hr.  - If PO diet continues, continue diet as ordered but d/c ensure enlive supplement. Nutrition department will provide Orgain (220 kcal, 16 g pro) as alternative  - monitor electrolytes (potassium, magnesium, phosphorus)    Jose Mcdaniel, [id number] or TEAMS

## 2023-09-01 NOTE — PROGRESS NOTE ADULT - PROBLEM SELECTOR PLAN 1
- ISO newly found metastatic cancer to liver, lung, bone  - Severe generalized weakness, pt was scared to go home following IR biopsy 8/29, sent straight to [facility]. Minimal appetite, fatigue, unintentional weight loss  - Speech + swallow eval with cinesophagram - recommending minced and moist with mildly thick liquids. 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 Minimal appetite, fatigue, unintentional weight loss. Denies dysphagia or odynophagia but difficulty initiating swallow and feels like food gets "stuck at the top of stomach", leading him to feel full easily and eat minimally.   - Speech + swallow eval with cinesophagram - recommending minced and moist with mildly thick liquids. Patient has silent aspiration; will potentially consider neuro eval for unknown etiology of dysphagia  - Per GI, consider NGT placement if pt not meeting caloric needs   - Aspiration precautions   - PT eval --> rec home PT - ISO newly found metastatic cancer to liver, lung, bone  - Severe generalized weakness, pt was scared to go home following IR biopsy 8/29, sent straight to [facility]. Minimal appetite, fatigue, unintentional weight loss. Denies dysphagia or odynophagia but difficulty initiating swallow and feels like food gets "stuck at the top of stomach", leading him to feel full easily and eat minimally.   - Speech + swallow eval with cineesophagram - recommending minced and moist with mildly thick liquids. Patient has silent aspiration; will potentially consider neuro eval for unknown etiology of dysphagia  - Per GI, consider NGT placement if pt not meeting caloric needs. Pt and family amenable, questions about NGT answered. Pt willing to have it placed despite discomfort as priority is building up strength in order to tolerate any sort of recommended cancer treatment regimen.   - Aspiration precautions   - PT eval --> rec home PT

## 2023-09-01 NOTE — PROGRESS NOTE ADULT - PROBLEM SELECTOR PLAN 8
Diet: Minced and moist with mildly thickened liquids + Ensure; nutrition services consult placed for assistance with meeting nutritional needs given pt's low BMI   DVT ppx: SubQ heparin   Code Status: Extensive GOC discussion with pt at bedside. Says he has a lot to live for including family, grandkids and wants "everything done to keep him alive" including CPR, intubation, and pressors. Will consider consulting palliative once primary diagnosis established and prognosis is more clear; pt with minimal pain needs at this time   Dispo: Pending clinical course, now doing more onc w/u inpatient Diet: Minced and moist with mildly thickened liquids + Ensure; nutrition services consult placed for assistance with meeting nutritional needs given pt's low BMI. Pt and family interested in NGT.  DVT ppx: SubQ heparin   Code Status: Extensive GOC discussion with pt at bedside. Says he has a lot to live for including family, grandkids and wants "everything done to keep him alive" including CPR, intubation, and pressors. Will consider consulting palliative once primary diagnosis established and prognosis is more clear; pt with minimal pain needs at this time   Dispo: Pending clinical course, now doing more onc w/u inpatient Diet: Minced and moist with mildly thickened liquids + Ensure; nutrition services consult placed for assistance with meeting nutritional needs given pt's low BMI. Pt and family interested in NGT.  DVT ppx: SubQ heparin   Code Status: Extensive GOC discussion with pt at bedside. Says he has a lot to live for including family, grandkids and wants "everything done to keep him alive" including CPR, intubation, and pressors. Will consult palliative for assistance with nutritional management and pain control, although likely will be more useful once primary diagnosis established and prognosis is more clear  Dispo: Pending clinical course, now doing more onc w/u inpatient

## 2023-09-01 NOTE — PROGRESS NOTE ADULT - PROBLEM SELECTOR PLAN 2
- Known liver mets (vs primary tumor?) s/p biopsy with IR outpatient 8/29. Will f/u results - onc will email to expedite results   - CTAP on admission w/ hepatomegaly with presumed innumerable hepatic metastatic lesions, trace ascites, mild anasarca  - Oncology consulted: checking AFP tumor marker, , CEA, PT/PTT/fibrinogen/d-dimer, PSA, viral hepatitis, LDH, uric acid  - Concern for possible biliary obstruction 2/2 rapidly elevated TBili and AlkPhos compared to earlier this month; consulted GI this AM  - Per GI repeat MRCP not necessary at this time  - PSA 3.5 in April 2023, recent colonoscopy several years ago w/ benign polyps - Known liver mets (vs primary tumor?) s/p biopsy with IR outpatient 8/29. Will f/u results - onc will email to expedite results   - CTAP on admission w/ hepatomegaly with presumed innumerable hepatic metastatic lesions, trace ascites, mild anasarca  - TBili increasing: 3.9 9/1 up from 1.0 at beginning of August.   - Oncology consulted: AFP tumor marker, , viral hepatitis, LDH pending. Elevated CEA (164), d-dimer (5418)  - Concern for possible biliary obstruction 2/2 rapidly elevated TBili and AlkPhos compared to earlier this month; GI consulted, recommending repeat MRCP, pending.   - PSA 3.5 in April 2023, recent colonoscopy several years ago w/ benign polyps  - Appreciate onc recs - Known liver mets (vs primary tumor?) s/p biopsy with IR outpatient 8/29. Will f/u results - onc will email to expedite results   - CTAP on admission w/ hepatomegaly with presumed innumerable hepatic metastatic lesions, trace ascites, mild anasarca  - TBili increasing: 3.9 9/1 up from 1.0 at beginning of August.   - Oncology consulted: , viral hepatitis, LDH pending. Elevated CEA (164), d-dimer (5418). Normal PSA and AFP tumor marker.   - Concern for possible biliary obstruction 2/2 rapidly elevated TBili and AlkPhos compared to earlier this month; GI consulted, recommending repeat MRCP, pending.   - PSA 3.5 in April 2023, recent colonoscopy several years ago w/ benign polyps  - Appreciate onc recs - Known liver mets (vs primary tumor?) s/p biopsy with IR outpatient 8/29. Will f/u results - onc will email to expedite results   - CTAP on admission w/ hepatomegaly with presumed innumerable hepatic metastatic lesions, trace ascites, mild anasarca  - TBili increasing: 3.9 9/1 up from 1.0 at beginning of August.   - Oncology consulted: Viral hepatitis, LDH pending. Elevated CEA (164), d-dimer (5418). Normal PSA, CA9-19, AFP tumor marker.   - Concern for possible biliary obstruction 2/2 rapidly elevated TBili and AlkPhos compared to earlier this month; GI consulted, recommending repeat MRCP, pending.   - PSA 3.5 in April 2023, recent colonoscopy several years ago w/ benign polyps  - Appreciate onc recs

## 2023-09-01 NOTE — PROGRESS NOTE ADULT - SUBJECTIVE AND OBJECTIVE BOX
Malka Akers MD  PGY1  Preferred contact via [employer] Teams      Patient is a 83y old  Male who presents with a chief complaint of Hyperkalemia, GREG (29 Aug 2023 15:51)      SUBJECTIVE / OVERNIGHT EVENTS: No ON events. Feeling well this morning, no abdominal pain or back pain but still feeling very "full" even with minimal bites to eat or drink. Concerned about going home ISO generalized weakness.    MEDICATIONS  (STANDING):  heparin   Injectable 5000 Unit(s) SubCutaneous every 12 hours  lactated ringers. 1000 milliLiter(s) (75 mL/Hr) IV Continuous <Continuous>    MEDICATIONS  (PRN):  acetaminophen     Tablet .. 650 milliGRAM(s) Oral every 6 hours PRN Moderate Pain (4 - 6)    REVIEW OF SYSTEMS:    CONSTITUTIONAL: No weakness, fevers or chills  EYES/ENT: No visual changes;  No vertigo or throat pain   NECK: No pain or stiffness  RESPIRATORY: No cough, wheezing, hemoptysis; No shortness of breath  CARDIOVASCULAR: No chest pain or palpitations  GASTROINTESTINAL: No abdominal or epigastric pain. No nausea, vomiting, or hematemesis; No diarrhea or constipation. No melena or hematochezia.  GENITOURINARY: No dysuria, frequency or hematuria  NEUROLOGICAL: No numbness or weakness  SKIN: +Slight itching in epigastric region, no rash    PHYSICAL EXAM:    Vital Signs Last 24 Hrs  T(C): 36.3 (31 Aug 2023 05:11), Max: 36.6 (30 Aug 2023 17:26)  T(F): 97.4 (31 Aug 2023 05:11), Max: 97.8 (30 Aug 2023 17:26)  HR: 74 (31 Aug 2023 05:11) (60 - 74)  BP: 113/81 (31 Aug 2023 05:11) (108/67 - 122/77)  RR: 17 (31 Aug 2023 05:11) (17 - 17)  SpO2: 99% (31 Aug 2023 05:11) (99% - 100%)    Parameters below as of 31 Aug 2023 05:11  Patient On (Oxygen Delivery Method): room air      GENERAL: Extremely frail, resting in bed comfortably   HEAD:  Atraumatic, normocephalic, Temporal wasting  EYES: EOMI, PERRLA, conjunctiva and sclera clear, no scleral icterus   ENT: Moist mucous membranes, no jaundice underneath tongue  NECK: Supple, no JVD  HEART: Regular rate and rhythm, no murmurs, rubs, or gallops  LUNGS: Unlabored respirations.  Clear to auscultation bilaterally, no crackles, wheezing, or rhonchi  ABDOMEN: No tenderness to palpation in all 4 quadrants. Soft, ND, no rebound, no guarding; no palpable masses  EXTREMITIES: 2+ peripheral pulses bilaterally. No clubbing, cyanosis, or edema  NERVOUS SYSTEM:  A&Ox3, no focal deficits   SKIN: No rashes or lesions, no jaundice     LABS:                           11.7   8.23  )-----------( 172      ( 31 Aug 2023 04:50 )             36.6         131<L>  |  92<L>  |  48<H>  ----------------------------<  82  4.9   |  23  |  1.81<H>    Ca    10.5      31 Aug 2023 04:50  Phos  2.4       Mg     2.30         TPro  7.0  /  Alb  3.8  /  TBili  3.7<H>  /  DBili  x   /  AST  405<H>  /  ALT  130<H>  /  AlkPhos  651<H>      PT/INR - ( 30 Aug 2023 17:00 )   PT: 12.9 sec;   INR: 1.15 ratio    PTT - ( 30 Aug 2023 17:00 )  PTT:31.8 sec    Hepatic Function Panel (23 @ 17:00)   Indirect Reacting Bilirubin: 0.8 mg/dL  Protein Total: 6.9 g/dL  Albumin: 3.8 g/dL  Bilirubin Total: 3.6 mg/dL  Bilirubin Direct: 2.8 mg/dL  Alkaline Phosphatase: 648 U/L  Aspartate Aminotransferase (AST/SGOT): 431 U/L  Alanine Aminotransferase (ALT/SGPT): 124 U/L    Uric Acid (23 @ 17:00)   Uric Acid: 12.9 mg/dL    Carcinoembryonic Antigen (23 @ 17:00)   Carcinoembryonic Antigen: 164.0 ng/mL    D-Dimer Assay, Quantitative (23 @ 17:00)   D-Dimer Assay, Quantitative: 5418    Lipase (23 @ 21:25)   Lipase: 83 U/L    Lactate, Blood (23 @ 06:00)   Lactate, Blood: 1.8 mmol/L    Urinalysis Basic - ( 30 Aug 2023 02:55 )    Color: Dark Yellow / Appearance: Cloudy / S.022 / pH: x  Gluc: x / Ketone: Trace mg/dL  / Bili: Small / Urobili: 1.0 mg/dL   Blood: x / Protein: 100 mg/dL / Nitrite: Negative   Leuk Esterase: Negative / RBC: 5 /HPF / WBC 4 /HPF   Sq Epi: x / Non Sq Epi: 3 /HPF / Bacteria: Negative /HPF        RADIOLOGY & ADDITIONAL TESTS:    < from: CT Abdomen and Pelvis No Cont (23 @ 22:38) >  IMPRESSION:  No hydronephrosis or stone.  Redemonstration of hepatomegaly with presumed innumerable hepatic   metastatic lesions.  Trace ascites and mild anasarca.    < end of copied text >       Malka Akers MD  PGY1  Preferred contact via [employer] Teams      Patient is a 83y old  Male who presents with a chief complaint of Hyperkalemia, GREG (29 Aug 2023 15:51)      SUBJECTIVE / OVERNIGHT EVENTS: No ON events. Feeling fatigued this AM despite getting full night sleep, epigastic pain worsened by po intake. no abdominal pain or back pain but still feeling very "full" even with minimal bites to eat or drink. Concerned about going home ISO generalized weakness.    MEDICATIONS  (STANDING):  heparin   Injectable 5000 Unit(s) SubCutaneous every 12 hours  lactated ringers. 1000 milliLiter(s) (75 mL/Hr) IV Continuous <Continuous>    MEDICATIONS  (PRN):  acetaminophen     Tablet .. 650 milliGRAM(s) Oral every 6 hours PRN Moderate Pain (4 - 6)    REVIEW OF SYSTEMS:    CONSTITUTIONAL: No weakness, fevers or chills  EYES/ENT: No visual changes;  No vertigo or throat pain   NECK: No pain or stiffness  RESPIRATORY: No cough, wheezing, hemoptysis; No shortness of breath  CARDIOVASCULAR: No chest pain or palpitations  GASTROINTESTINAL: No abdominal or epigastric pain. No nausea, vomiting, or hematemesis; No diarrhea or constipation. No melena or hematochezia.  GENITOURINARY: No dysuria, frequency or hematuria  NEUROLOGICAL: No numbness or weakness  SKIN: +Slight itching in epigastric region, no rash    PHYSICAL EXAM:    Vital Signs Last 24 Hrs  T(C): 36.3 (01 Sep 2023 05:33), Max: 36.4 (31 Aug 2023 22:06)  T(F): 97.4 (01 Sep 2023 05:33), Max: 97.6 (31 Aug 2023 22:06)  HR: 65 (01 Sep 2023 05:33) (55 - 68)  BP: 117/74 (01 Sep 2023 05:33) (110/74 - 117/74)  RR: 17 (01 Sep 2023 05:33) (17 - 18)  SpO2: 99% (01 Sep 2023 05:33) (97% - 99%)    Parameters below as of 01 Sep 2023 05:33  Patient On (Oxygen Delivery Method): room air      GENERAL: Extremely frail, resting in bed comfortably   HEAD:  Atraumatic, normocephalic, Temporal wasting  EYES: EOMI, PERRLA, conjunctiva and sclera clear, no scleral icterus   ENT: Moist mucous membranes, no jaundice underneath tongue  NECK: Supple, no JVD  HEART: Regular rate and rhythm, no murmurs, rubs, or gallops  LUNGS: Unlabored respirations.  Clear to auscultation bilaterally, no crackles, wheezing, or rhonchi  ABDOMEN: No tenderness to palpation in all 4 quadrants. Soft, ND, no rebound, no guarding; no palpable masses  EXTREMITIES: 2+ peripheral pulses bilaterally. No clubbing, cyanosis, or edema  NERVOUS SYSTEM:  A&Ox3, no focal deficits   SKIN: No rashes or lesions, no jaundice     LABS:                CBC Full  -  ( 01 Sep 2023 06:43 )  WBC Count : 8.32 K/uL  RBC Count : 3.49 M/uL  Hemoglobin : 11.2 g/dL  Hematocrit : 32.6 %  Platelet Count - Automated : 186 K/uL  Mean Cell Volume : 93.4 fL  Mean Cell Hemoglobin : 32.1 pg  Mean Cell Hemoglobin Concentration : 34.4 gm/dL  Auto Neutrophil # : 6.86 K/uL  Auto Lymphocyte # : 0.60 K/uL  Auto Monocyte # : 0.74 K/uL  Auto Eosinophil # : 0.06 K/uL  Auto Basophil # : 0.02 K/uL  Auto Neutrophil % : 82.5 %  Auto Lymphocyte % : 7.2 %  Auto Monocyte % : 8.9 %  Auto Eosinophil % : 0.7 %  Auto Basophil % : 0.2 %        132<L>  |  96<L>  |  46<H>  ----------------------------<  85  5.2   |  24  |  1.67<H>    Ca    10.2      01 Sep 2023 06:43  Phos  2.6       Mg     2.20         TPro  6.7  /  Alb  3.6  /  TBili  3.9<H>  /  DBili  x   /  AST  430<H>  /  ALT  131<H>  /  AlkPhos  647<H>      PT/INR - ( 30 Aug 2023 17:00 )   PT: 12.9 sec;   INR: 1.15 ratio    PTT - ( 30 Aug 2023 17:00 )  PTT:31.8 sec    Hepatic Function Panel (23 @ 17:00)   Indirect Reacting Bilirubin: 0.8 mg/dL  Protein Total: 6.9 g/dL  Albumin: 3.8 g/dL  Bilirubin Total: 3.6 mg/dL  Bilirubin Direct: 2.8 mg/dL  Alkaline Phosphatase: 648 U/L  Aspartate Aminotransferase (AST/SGOT): 431 U/L  Alanine Aminotransferase (ALT/SGPT): 124 U/L  Uric Acid (23 @ 17:00)   Uric Acid: 12.9 mg/dL  Carcinoembryonic Antigen (23 @ 17:00)   Carcinoembryonic Antigen: 164.0 ng/mL    D-Dimer Assay, Quantitative (23 @ 17:00)   D-Dimer Assay, Quantitative: 5418    Lipase (23 @ 21:25)   Lipase: 83 U/L    Lactate, Blood (23 @ 06:00)   Lactate, Blood: 1.8 mmol/L    Urinalysis Basic - ( 30 Aug 2023 02:55 )    Color: Dark Yellow / Appearance: Cloudy / S.022 / pH: x  Gluc: x / Ketone: Trace mg/dL  / Bili: Small / Urobili: 1.0 mg/dL   Blood: x / Protein: 100 mg/dL / Nitrite: Negative   Leuk Esterase: Negative / RBC: 5 /HPF / WBC 4 /HPF   Sq Epi: x / Non Sq Epi: 3 /HPF / Bacteria: Negative /HPF        RADIOLOGY & ADDITIONAL TESTS:    < from: CT Abdomen and Pelvis No Cont (23 @ 22:38) >  IMPRESSION:  No hydronephrosis or stone.  Redemonstration of hepatomegaly with presumed innumerable hepatic   metastatic lesions.  Trace ascites and mild anasarca.    < end of copied text >       Malka Akers MD  PGY1  Preferred contact via [employer] Teams    Patient is a 83y old  Male who presents with a chief complaint of Hyperkalemia, GREG (29 Aug 2023 15:51)    SUBJECTIVE / OVERNIGHT EVENTS: No ON events. Patient's son at bedside. Continues to feel very fatigued and weak. Was able to eat a bit of breakfast, had a slightly easier time with the minced and moist diet but still only ate a small amount (a few bites of each food, 4 crackers). Family is very concerned that patient is not getting enough nutrition and that he will be too weak and frail to undergo chemotherapy or whichever treatments, if any, are recommended by onc. Pt and family mentioned him getting an NG tube and pt willing to have it placed as soon as possible to aid in increasing caloric intake. Also experiencing mouth dryness.     MEDICATIONS  (STANDING):  heparin   Injectable 5000 Unit(s) SubCutaneous every 12 hours  lactated ringers. 1000 milliLiter(s) (75 mL/Hr) IV Continuous <Continuous>    MEDICATIONS  (PRN):  acetaminophen     Tablet .. 650 milliGRAM(s) Oral every 6 hours PRN Moderate Pain (4 - 6)    REVIEW OF SYSTEMS:    CONSTITUTIONAL: No weakness, fevers or chills  EYES/ENT: No visual changes;  No vertigo or throat pain. +Dry mouth   NECK: No pain or stiffness  RESPIRATORY: No cough, wheezing, hemoptysis; No shortness of breath  CARDIOVASCULAR: No chest pain or palpitations  GASTROINTESTINAL: +Mild epigastric pain, early satiety. No nausea, vomiting, or hematemesis; No diarrhea or constipation. No melena or hematochezia.  GENITOURINARY: No dysuria, frequency or hematuria  NEUROLOGICAL: No numbness or weakness  SKIN: No itching, no rash    PHYSICAL EXAM:    Vital Signs Last 24 Hrs  T(C): 36.3 (01 Sep 2023 05:33), Max: 36.4 (31 Aug 2023 22:06)  T(F): 97.4 (01 Sep 2023 05:33), Max: 97.6 (31 Aug 2023 22:06)  HR: 65 (01 Sep 2023 05:33) (55 - 68)  BP: 117/74 (01 Sep 2023 05:33) (110/74 - 117/74)  RR: 17 (01 Sep 2023 05:33) (17 - 18)  SpO2: 99% (01 Sep 2023 05:33) (97% - 99%)    Parameters below as of 01 Sep 2023 05:33  Patient On (Oxygen Delivery Method): room air    GENERAL: Extremely frail, resting in bed comfortably   HEAD:  Atraumatic, normocephalic, Temporal wasting  EYES: EOMI, PERRLA, conjunctiva and sclera clear, no scleral icterus   ENT: Dry mucous membranes, no jaundice underneath tongue  NECK: Supple, no JVD  HEART: Regular rate and rhythm, no murmurs, rubs, or gallops  LUNGS: Unlabored respirations.  Clear to auscultation bilaterally, no crackles, wheezing, or rhonchi  ABDOMEN: No tenderness to palpation in all 4 quadrants. Soft, ND, no rebound, no guarding; no palpable masses  EXTREMITIES: 2+ peripheral pulses bilaterally. No clubbing, cyanosis, or edema  NERVOUS SYSTEM:  A&Ox3, no focal deficits   SKIN: No rashes or lesions, no jaundice     LABS:                CBC Full  -  ( 01 Sep 2023 06:43 )  WBC Count : 8.32 K/uL  RBC Count : 3.49 M/uL  Hemoglobin : 11.2 g/dL  Hematocrit : 32.6 %  Platelet Count - Automated : 186 K/uL  Mean Cell Volume : 93.4 fL  Mean Cell Hemoglobin : 32.1 pg  Mean Cell Hemoglobin Concentration : 34.4 gm/dL  Auto Neutrophil # : 6.86 K/uL  Auto Lymphocyte # : 0.60 K/uL  Auto Monocyte # : 0.74 K/uL  Auto Eosinophil # : 0.06 K/uL  Auto Basophil # : 0.02 K/uL  Auto Neutrophil % : 82.5 %  Auto Lymphocyte % : 7.2 %  Auto Monocyte % : 8.9 %  Auto Eosinophil % : 0.7 %  Auto Basophil % : 0.2 %        132<L>  |  96<L>  |  46<H>  ----------------------------<  85  5.2   |  24  |  1.67<H>    Ca    10.2      01 Sep 2023 06:43  Phos  2.6       Mg     2.20         TPro  6.7  /  Alb  3.6  /  TBili  3.9<H>  /  DBili  x   /  AST  430<H>  /  ALT  131<H>  /  AlkPhos  647<H>      PT/INR - ( 30 Aug 2023 17:00 )   PT: 12.9 sec;   INR: 1.15 ratio    PTT - ( 30 Aug 2023 17:00 )  PTT:31.8 sec    Alpha Fetoprotein - Tumor Marker (23 @ 06:43)   Alpha Fetoprotein - Tumor Marker: 3.8    Prostate Ca Screen, PSA Total (23 @ 06:43)   Prostate Ca Screen, PSA Total: 3.47    Hepatic Function Panel (23 @ 17:00)   Indirect Reacting Bilirubin: 0.8 mg/dL  Protein Total: 6.9 g/dL  Albumin: 3.8 g/dL  Bilirubin Total: 3.6 mg/dL  Bilirubin Direct: 2.8 mg/dL  Alkaline Phosphatase: 648 U/L  Aspartate Aminotransferase (AST/SGOT): 431 U/L  Alanine Aminotransferase (ALT/SGPT): 124 U/L  Uric Acid (23 @ 17:00)   Uric Acid: 12.9 mg/dL  Carcinoembryonic Antigen (23 @ 17:00)   Carcinoembryonic Antigen: 164.0 ng/mL    D-Dimer Assay, Quantitative (23 @ 17:00)   D-Dimer Assay, Quantitative: 5418    Lipase (23 @ 21:25)   Lipase: 83 U/L    Lactate, Blood (23 @ 06:00)   Lactate, Blood: 1.8 mmol/L    Urinalysis Basic - ( 30 Aug 2023 02:55 )    Color: Dark Yellow / Appearance: Cloudy / S.022 / pH: x  Gluc: x / Ketone: Trace mg/dL  / Bili: Small / Urobili: 1.0 mg/dL   Blood: x / Protein: 100 mg/dL / Nitrite: Negative   Leuk Esterase: Negative / RBC: 5 /HPF / WBC 4 /HPF   Sq Epi: x / Non Sq Epi: 3 /HPF / Bacteria: Negative /HPF        RADIOLOGY & ADDITIONAL TESTS:    < from: CT Abdomen and Pelvis No Cont (23 @ 22:38) >  IMPRESSION:  No hydronephrosis or stone.  Redemonstration of hepatomegaly with presumed innumerable hepatic   metastatic lesions.  Trace ascites and mild anasarca.    < end of copied text >

## 2023-09-01 NOTE — PROGRESS NOTE ADULT - PROBLEM SELECTOR PLAN 7
Diet: Minced and moist with mildly thickened liquids + Ensure; nutrition services consult placed for assistance with meeting nutritional needs given pt's low BMI   DVT ppx: SubQ heparin   Code Status: Extensive GOC discussion with pt at bedside. Says he has a lot to live for including family, grandkids and wants "everything done to keep him alive" including CPR, intubation, and pressors. Will consider consulting palliative once primary diagnosis established and prognosis is more clear; pt with minimal pain needs at this time   Dispo: Pending clinical course, now doing more onc w/u inpatient - Pt normotensive 8/29  - Hold lisinopril 5 mg ISO GREG - Pt continues to be normotensive   - Hold lisinopril 5 mg ISO GREG

## 2023-09-01 NOTE — PROGRESS NOTE ADULT - PROBLEM SELECTOR PLAN 6
- Pt normotensive 8/29  - Hold lisinopril 5 mg ISO GREG - K 5.7 on outpatient labs 8/29  - Repeat K 5.3 8/29 PM --> 5.1 this AM. RESOLVED - K 5.7 on outpatient labs 8/29  - K 5.2 9/1 --> RESOLVED

## 2023-09-01 NOTE — PROGRESS NOTE ADULT - PROBLEM SELECTOR PLAN 4
- Improved to 131 this Am   - Serum Osm, urine Osm, urine Na not collected yesterday 8/30 - Improved to 132 this AM 9/1

## 2023-09-01 NOTE — PROGRESS NOTE ADULT - ATTENDING COMMENTS
plan of care discussed with pt and son at bedside - would prefer not to do NGT at this time - would do esophagram/small bowel series - would add remeron for appetite stimulant.  appreciate nutrition eval.

## 2023-09-02 LAB
ALBUMIN SERPL ELPH-MCNC: 3.4 G/DL — SIGNIFICANT CHANGE UP (ref 3.3–5)
ALP SERPL-CCNC: 624 U/L — HIGH (ref 40–120)
ALT FLD-CCNC: 126 U/L — HIGH (ref 4–41)
ANION GAP SERPL CALC-SCNC: 13 MMOL/L — SIGNIFICANT CHANGE UP (ref 7–14)
ANION GAP SERPL CALC-SCNC: 13 MMOL/L — SIGNIFICANT CHANGE UP (ref 7–14)
AST SERPL-CCNC: 410 U/L — HIGH (ref 4–40)
BASOPHILS # BLD AUTO: 0.03 K/UL — SIGNIFICANT CHANGE UP (ref 0–0.2)
BASOPHILS NFR BLD AUTO: 0.4 % — SIGNIFICANT CHANGE UP (ref 0–2)
BILIRUB SERPL-MCNC: 4 MG/DL — HIGH (ref 0.2–1.2)
BUN SERPL-MCNC: 45 MG/DL — HIGH (ref 7–23)
BUN SERPL-MCNC: 45 MG/DL — HIGH (ref 7–23)
CALCIUM SERPL-MCNC: 10 MG/DL — SIGNIFICANT CHANGE UP (ref 8.4–10.5)
CALCIUM SERPL-MCNC: 9.8 MG/DL — SIGNIFICANT CHANGE UP (ref 8.4–10.5)
CHLORIDE SERPL-SCNC: 93 MMOL/L — LOW (ref 98–107)
CHLORIDE SERPL-SCNC: 94 MMOL/L — LOW (ref 98–107)
CO2 SERPL-SCNC: 25 MMOL/L — SIGNIFICANT CHANGE UP (ref 22–31)
CO2 SERPL-SCNC: 26 MMOL/L — SIGNIFICANT CHANGE UP (ref 22–31)
CREAT SERPL-MCNC: 1.53 MG/DL — HIGH (ref 0.5–1.3)
CREAT SERPL-MCNC: 1.56 MG/DL — HIGH (ref 0.5–1.3)
EGFR: 44 ML/MIN/1.73M2 — LOW
EGFR: 45 ML/MIN/1.73M2 — LOW
EOSINOPHIL # BLD AUTO: 0.09 K/UL — SIGNIFICANT CHANGE UP (ref 0–0.5)
EOSINOPHIL NFR BLD AUTO: 1.1 % — SIGNIFICANT CHANGE UP (ref 0–6)
GLUCOSE SERPL-MCNC: 75 MG/DL — SIGNIFICANT CHANGE UP (ref 70–99)
GLUCOSE SERPL-MCNC: 85 MG/DL — SIGNIFICANT CHANGE UP (ref 70–99)
HCT VFR BLD CALC: 34.1 % — LOW (ref 39–50)
HGB BLD-MCNC: 11.2 G/DL — LOW (ref 13–17)
IANC: 6.22 K/UL — SIGNIFICANT CHANGE UP (ref 1.8–7.4)
IMM GRANULOCYTES NFR BLD AUTO: 0.9 % — SIGNIFICANT CHANGE UP (ref 0–0.9)
LYMPHOCYTES # BLD AUTO: 0.67 K/UL — LOW (ref 1–3.3)
LYMPHOCYTES # BLD AUTO: 8.4 % — LOW (ref 13–44)
MAGNESIUM SERPL-MCNC: 2.1 MG/DL — SIGNIFICANT CHANGE UP (ref 1.6–2.6)
MCHC RBC-ENTMCNC: 31.3 PG — SIGNIFICANT CHANGE UP (ref 27–34)
MCHC RBC-ENTMCNC: 32.8 GM/DL — SIGNIFICANT CHANGE UP (ref 32–36)
MCV RBC AUTO: 95.3 FL — SIGNIFICANT CHANGE UP (ref 80–100)
MONOCYTES # BLD AUTO: 0.88 K/UL — SIGNIFICANT CHANGE UP (ref 0–0.9)
MONOCYTES NFR BLD AUTO: 11.1 % — SIGNIFICANT CHANGE UP (ref 2–14)
NEUTROPHILS # BLD AUTO: 6.22 K/UL — SIGNIFICANT CHANGE UP (ref 1.8–7.4)
NEUTROPHILS NFR BLD AUTO: 78.1 % — HIGH (ref 43–77)
NRBC # BLD: 0 /100 WBCS — SIGNIFICANT CHANGE UP (ref 0–0)
NRBC # FLD: 0.03 K/UL — HIGH (ref 0–0)
PHOSPHATE SERPL-MCNC: 2.4 MG/DL — LOW (ref 2.5–4.5)
PLATELET # BLD AUTO: 175 K/UL — SIGNIFICANT CHANGE UP (ref 150–400)
POTASSIUM SERPL-MCNC: 5.4 MMOL/L — HIGH (ref 3.5–5.3)
POTASSIUM SERPL-MCNC: 5.9 MMOL/L — HIGH (ref 3.5–5.3)
POTASSIUM SERPL-SCNC: 5.4 MMOL/L — HIGH (ref 3.5–5.3)
POTASSIUM SERPL-SCNC: 5.9 MMOL/L — HIGH (ref 3.5–5.3)
PROT SERPL-MCNC: 6.6 G/DL — SIGNIFICANT CHANGE UP (ref 6–8.3)
RBC # BLD: 3.58 M/UL — LOW (ref 4.2–5.8)
RBC # FLD: 14.2 % — SIGNIFICANT CHANGE UP (ref 10.3–14.5)
SODIUM SERPL-SCNC: 132 MMOL/L — LOW (ref 135–145)
SODIUM SERPL-SCNC: 132 MMOL/L — LOW (ref 135–145)
WBC # BLD: 7.96 K/UL — SIGNIFICANT CHANGE UP (ref 3.8–10.5)
WBC # FLD AUTO: 7.96 K/UL — SIGNIFICANT CHANGE UP (ref 3.8–10.5)

## 2023-09-02 PROCEDURE — 99233 SBSQ HOSP IP/OBS HIGH 50: CPT | Mod: GC

## 2023-09-02 RX ORDER — SODIUM CHLORIDE 9 MG/ML
1000 INJECTION, SOLUTION INTRAVENOUS
Refills: 0 | Status: DISCONTINUED | OUTPATIENT
Start: 2023-09-02 | End: 2023-09-03

## 2023-09-02 RX ORDER — SODIUM ZIRCONIUM CYCLOSILICATE 10 G/10G
5 POWDER, FOR SUSPENSION ORAL ONCE
Refills: 0 | Status: COMPLETED | OUTPATIENT
Start: 2023-09-02 | End: 2023-09-02

## 2023-09-02 RX ORDER — OXYCODONE HYDROCHLORIDE 5 MG/1
2.5 TABLET ORAL EVERY 6 HOURS
Refills: 0 | Status: DISCONTINUED | OUTPATIENT
Start: 2023-09-02 | End: 2023-09-02

## 2023-09-02 RX ORDER — HYDROMORPHONE HYDROCHLORIDE 2 MG/ML
2 INJECTION INTRAMUSCULAR; INTRAVENOUS; SUBCUTANEOUS EVERY 6 HOURS
Refills: 0 | Status: DISCONTINUED | OUTPATIENT
Start: 2023-09-02 | End: 2023-09-08

## 2023-09-02 RX ADMIN — HEPARIN SODIUM 5000 UNIT(S): 5000 INJECTION INTRAVENOUS; SUBCUTANEOUS at 05:14

## 2023-09-02 RX ADMIN — Medication 650 MILLIGRAM(S): at 09:34

## 2023-09-02 RX ADMIN — MIRTAZAPINE 15 MILLIGRAM(S): 45 TABLET, ORALLY DISINTEGRATING ORAL at 22:51

## 2023-09-02 RX ADMIN — SODIUM CHLORIDE 75 MILLILITER(S): 9 INJECTION, SOLUTION INTRAVENOUS at 01:29

## 2023-09-02 RX ADMIN — Medication 650 MILLIGRAM(S): at 08:34

## 2023-09-02 RX ADMIN — Medication 100 MILLIGRAM(S): at 11:48

## 2023-09-02 RX ADMIN — HEPARIN SODIUM 5000 UNIT(S): 5000 INJECTION INTRAVENOUS; SUBCUTANEOUS at 17:43

## 2023-09-02 RX ADMIN — SODIUM ZIRCONIUM CYCLOSILICATE 5 GRAM(S): 10 POWDER, FOR SUSPENSION ORAL at 11:55

## 2023-09-02 RX ADMIN — Medication 650 MILLIGRAM(S): at 19:05

## 2023-09-02 RX ADMIN — Medication 650 MILLIGRAM(S): at 18:15

## 2023-09-02 RX ADMIN — SODIUM CHLORIDE 75 MILLILITER(S): 9 INJECTION, SOLUTION INTRAVENOUS at 17:45

## 2023-09-02 NOTE — PROGRESS NOTE ADULT - ASSESSMENT
Pt is an 83 y.o. M w/ PMHx HTN, BPH, recent findings of metastatic cancer to lung, liver, and pelvic bone w/ unclear primary source at this point, present with severe generalized weakness ISO electrolyte derangements including hyponatremia and hyperkalemia along w/ GREG, most likely due to pre-renal cause secondary to poor po intake. Today 9/1, hyponatremia and GREG continuing to improve. Patient endorsing fatigue and weakness, early satiety and lack of appetite; family concerned about nutrition and mentioned NGT. Oncology consulted for management recommendations; workup ongoing. GI consulted for possible biliary obstruction, recommending MRCP, pending. Pt is an 83 y.o. M w/ PMHx HTN, BPH, recent findings of metastatic cancer to lung, liver, and pelvic bone w/ unclear primary source at this point, present with severe generalized weakness ISO electrolyte derangements including hyponatremia and hyperkalemia along w/ GREG, most likely due to pre-renal cause secondary to poor po intake.    Today 9/2, hyponatremia and GREG continuing to improve, mild hyperkalemia at 5.4, Patient continuing to endorse fatigue and weakness, early satiety and lack of appetite; specifically says he is avoiding eating due to kedar family concerned about nutrition and mentioned NGT. Oncology consulted for management recommendations; workup ongoing. GI consulted for possible biliary obstruction, recommending MRCP, pending.

## 2023-09-02 NOTE — PROGRESS NOTE ADULT - ATTENDING COMMENTS
84 yo gentlemen w/ PMHx HTN, BPH, recent findings of metastatic cancer to lung, liver, and pelvic bone w/ unclear primary source at this point, present with severe generalized weakness c/b electrolyte derangements including hyponatremia and hyperkalemia along w/ GREG, most likely due to pre-renal cause secondary to poor po intake.    Discussed extensively w/son at bedside who was entertaining the idea of NGT. Risks were discussed, patient to have very little benefit from NGT.   Patient is willing to try to have ice cream   Continue the rest of the work up and management as stated above.

## 2023-09-02 NOTE — PROGRESS NOTE ADULT - PROBLEM SELECTOR PLAN 2
- Known liver mets (vs primary tumor?) s/p biopsy with IR outpatient 8/29. Will f/u results - onc will email to expedite results   - CTAP on admission w/ hepatomegaly with presumed innumerable hepatic metastatic lesions, trace ascites, mild anasarca  - TBili increasing: 3.9 9/1 up from 1.0 at beginning of August.   - Oncology consulted: Viral hepatitis, LDH pending. Elevated CEA (164), d-dimer (5418). Normal PSA, CA9-19, AFP tumor marker.   - Concern for possible biliary obstruction 2/2 rapidly elevated TBili and AlkPhos compared to earlier this month; GI consulted, recommending repeat MRCP, pending.   - PSA 3.5 in April 2023, recent colonoscopy several years ago w/ benign polyps  - Appreciate onc recs - Known liver mets (vs primary tumor?) s/p biopsy with IR outpatient . Will f/u results - onc will email to expedite results   - CTAP on admission w/ hepatomegaly with presumed innumerable hepatic metastatic lesions, trace ascites, mild anasarca  - TBili increasin.0 9/ up from 1.0 at beginning of August.   - Oncology consulted: Elevated CEA (164), d-dimer (5418), LDH (621). Normal PSA, CA9-19, AFP tumor marker, Hepatitis non-reactive.  - Concern for possible biliary obstruction 2/2 rapidly elevated TBili and AlkPhos compared to earlier this month; GI consulted, recommending repeat MRCP, pending.   - PSA 3.5 in 2023, recent colonoscopy several years ago w/ benign polyps  - Appreciate onc recs

## 2023-09-02 NOTE — PROGRESS NOTE ADULT - SUBJECTIVE AND OBJECTIVE BOX
Malka Akers MD  PGY1  Preferred contact via [employer] Teams    Patient is a 83y old  Male who presents with a chief complaint of Hyperkalemia, GREG (29 Aug 2023 15:51)    SUBJECTIVE / OVERNIGHT EVENTS: No ON events. ******  Patient's son at bedside. Continues to feel very fatigued and weak. Was able to eat a bit of breakfast, had a slightly easier time with the minced and moist diet but still only ate a small amount (a few bites of each food, 4 crackers). Family is very concerned that patient is not getting enough nutrition and that he will be too weak and frail to undergo chemotherapy or whichever treatments, if any, are recommended by onc. Pt and family mentioned him getting an NG tube and pt willing to have it placed as soon as possible to aid in increasing caloric intake. Also experiencing mouth dryness.     MEDICATIONS  (STANDING):  heparin   Injectable 5000 Unit(s) SubCutaneous every 12 hours  lactated ringers. 1000 milliLiter(s) (75 mL/Hr) IV Continuous <Continuous>    MEDICATIONS  (PRN):  acetaminophen     Tablet .. 650 milliGRAM(s) Oral every 6 hours PRN Moderate Pain (4 - 6)    REVIEW OF SYSTEMS:    CONSTITUTIONAL: No weakness, fevers or chills  EYES/ENT: No visual changes;  No vertigo or throat pain. +Dry mouth   NECK: No pain or stiffness  RESPIRATORY: No cough, wheezing, hemoptysis; No shortness of breath  CARDIOVASCULAR: No chest pain or palpitations  GASTROINTESTINAL: +Mild epigastric pain, early satiety. No nausea, vomiting, or hematemesis; No diarrhea or constipation. No melena or hematochezia.  GENITOURINARY: No dysuria, frequency or hematuria  NEUROLOGICAL: No numbness or weakness  SKIN: No itching, no rash    PHYSICAL EXAM:    Vital Signs Last 24 Hrs  T(C): 36.3 (01 Sep 2023 05:33), Max: 36.4 (31 Aug 2023 22:06)  T(F): 97.4 (01 Sep 2023 05:33), Max: 97.6 (31 Aug 2023 22:06)  HR: 65 (01 Sep 2023 05:33) (55 - 68)  BP: 117/74 (01 Sep 2023 05:33) (110/74 - 117/74)  RR: 17 (01 Sep 2023 05:33) (17 - 18)  SpO2: 99% (01 Sep 2023 05:33) (97% - 99%)    Parameters below as of 01 Sep 2023 05:33  Patient On (Oxygen Delivery Method): room air    GENERAL: Extremely frail, resting in bed comfortably   HEAD:  Atraumatic, normocephalic, Temporal wasting  EYES: EOMI, PERRLA, conjunctiva and sclera clear, no scleral icterus   ENT: Dry mucous membranes, no jaundice underneath tongue  NECK: Supple, no JVD  HEART: Regular rate and rhythm, no murmurs, rubs, or gallops  LUNGS: Unlabored respirations.  Clear to auscultation bilaterally, no crackles, wheezing, or rhonchi  ABDOMEN: No tenderness to palpation in all 4 quadrants. Soft, ND, no rebound, no guarding; no palpable masses  EXTREMITIES: 2+ peripheral pulses bilaterally. No clubbing, cyanosis, or edema  NERVOUS SYSTEM:  A&Ox3, no focal deficits   SKIN: No rashes or lesions, no jaundice     LABS:                CBC Full  -  ( 01 Sep 2023 06:43 )  WBC Count : 8.32 K/uL  RBC Count : 3.49 M/uL  Hemoglobin : 11.2 g/dL  Hematocrit : 32.6 %  Platelet Count - Automated : 186 K/uL  Mean Cell Volume : 93.4 fL  Mean Cell Hemoglobin : 32.1 pg  Mean Cell Hemoglobin Concentration : 34.4 gm/dL  Auto Neutrophil # : 6.86 K/uL  Auto Lymphocyte # : 0.60 K/uL  Auto Monocyte # : 0.74 K/uL  Auto Eosinophil # : 0.06 K/uL  Auto Basophil # : 0.02 K/uL  Auto Neutrophil % : 82.5 %  Auto Lymphocyte % : 7.2 %  Auto Monocyte % : 8.9 %  Auto Eosinophil % : 0.7 %  Auto Basophil % : 0.2 %        132<L>  |  96<L>  |  46<H>  ----------------------------<  85  5.2   |  24  |  1.67<H>    Ca    10.2      01 Sep 2023 06:43  Phos  2.6       Mg     2.20         TPro  6.7  /  Alb  3.6  /  TBili  3.9<H>  /  DBili  x   /  AST  430<H>  /  ALT  131<H>  /  AlkPhos  647<H>      PT/INR - ( 30 Aug 2023 17:00 )   PT: 12.9 sec;   INR: 1.15 ratio    PTT - ( 30 Aug 2023 17:00 )  PTT:31.8 sec    Alpha Fetoprotein - Tumor Marker (23 @ 06:43)   Alpha Fetoprotein - Tumor Marker: 3.8    Prostate Ca Screen, PSA Total (23 @ 06:43)   Prostate Ca Screen, PSA Total: 3.47    Hepatic Function Panel (23 @ 17:00)   Indirect Reacting Bilirubin: 0.8 mg/dL  Protein Total: 6.9 g/dL  Albumin: 3.8 g/dL  Bilirubin Total: 3.6 mg/dL  Bilirubin Direct: 2.8 mg/dL  Alkaline Phosphatase: 648 U/L  Aspartate Aminotransferase (AST/SGOT): 431 U/L  Alanine Aminotransferase (ALT/SGPT): 124 U/L  Uric Acid (23 @ 17:00)   Uric Acid: 12.9 mg/dL  Carcinoembryonic Antigen (23 @ 17:00)   Carcinoembryonic Antigen: 164.0 ng/mL    D-Dimer Assay, Quantitative (23 @ 17:00)   D-Dimer Assay, Quantitative: 5418    Lipase (23 @ 21:25)   Lipase: 83 U/L    Lactate, Blood (23 @ 06:00)   Lactate, Blood: 1.8 mmol/L    Urinalysis Basic - ( 30 Aug 2023 02:55 )    Color: Dark Yellow / Appearance: Cloudy / S.022 / pH: x  Gluc: x / Ketone: Trace mg/dL  / Bili: Small / Urobili: 1.0 mg/dL   Blood: x / Protein: 100 mg/dL / Nitrite: Negative   Leuk Esterase: Negative / RBC: 5 /HPF / WBC 4 /HPF   Sq Epi: x / Non Sq Epi: 3 /HPF / Bacteria: Negative /HPF        RADIOLOGY & ADDITIONAL TESTS:    < from: CT Abdomen and Pelvis No Cont (23 @ 22:38) >  IMPRESSION:  No hydronephrosis or stone.  Redemonstration of hepatomegaly with presumed innumerable hepatic   metastatic lesions.  Trace ascites and mild anasarca.    < end of copied text >       Malka Akers MD  PGY1  Preferred contact via [employer] Teams    Patient is a 83y old  Male who presents with a chief complaint of Hyperkalemia, GREG (29 Aug 2023 15:51)    SUBJECTIVE / OVERNIGHT EVENTS: No ON events. Pt says he had a rough day yesterday because he was waiting in the hallway for his MRI for multiple hours and had a lot of visitors, so he was even more exhausted than usual. Does not feel like Remeron made much of a difference in his appetite. +Diarrhea. Woke up with worsening abdominal pain, more RUQ>LUQ but generally in a band-like distribution around upper abdomen.      MEDICATIONS  (STANDING):  heparin   Injectable 5000 Unit(s) SubCutaneous every 12 hours  lactated ringers. 1000 milliLiter(s) (75 mL/Hr) IV Continuous <Continuous>    MEDICATIONS  (PRN):  acetaminophen     Tablet .. 650 milliGRAM(s) Oral every 6 hours PRN Moderate Pain (4 - 6)    REVIEW OF SYSTEMS:    CONSTITUTIONAL: No weakness, fevers or chills  EYES/ENT: No visual changes;  No vertigo or throat pain. +Dry mouth   NECK: No pain or stiffness  RESPIRATORY: No cough, wheezing, hemoptysis; No shortness of breath  CARDIOVASCULAR: No chest pain or palpitations  GASTROINTESTINAL: +Mild-to-moderate upper abdominal pain, early satiety. No nausea, vomiting, or hematemesis; +Diarrhea. No melena or hematochezia.  GENITOURINARY: No dysuria, frequency or hematuria  NEUROLOGICAL: No numbness or weakness  SKIN: No itching, no rash    PHYSICAL EXAM:    Vital Signs Last 24 Hrs  T(C): 36.7 (02 Sep 2023 12:31), Max: 36.7 (02 Sep 2023 12:31)  T(F): 98.1 (02 Sep 2023 12:31), Max: 98.1 (02 Sep 2023 12:31)  HR: 61 (02 Sep 2023 12:31) (58 - 68)  BP: 102/68 (02 Sep 2023 12:31) (101/60 - 102/68)  RR: 18 (02 Sep 2023 12:31) (18 - 18)  SpO2: 98% (02 Sep 2023 12:31) (97% - 99%)    Parameters below as of 02 Sep 2023 12:31  Patient On (Oxygen Delivery Method): room air      GENERAL: Extremely frail, resting in bed comfortably   HEAD:  Atraumatic, normocephalic, Temporal wasting  EYES: EOMI, PERRLA, conjunctiva and sclera clear, no scleral icterus   ENT: Dry mucous membranes, no jaundice underneath tongue  NECK: Supple, no JVD  HEART: Regular rate and rhythm, no murmurs, rubs, or gallops  LUNGS: Unlabored respirations.  Clear to auscultation bilaterally, no crackles, wheezing, or rhonchi  ABDOMEN: No tenderness to palpation in all 4 quadrants (no abdominal pain at time of exam). Soft, ND, no rebound, no guarding; no palpable masses  EXTREMITIES: 2+ peripheral pulses bilaterally. No clubbing, cyanosis, or edema  NERVOUS SYSTEM:  A&Ox3, no focal deficits   SKIN: No rashes or lesions, no jaundice     LABS:                                    11.2   7.96  )-----------( 175      ( 02 Sep 2023 06:40 )             34.1     -    132<L>  |  93<L>  |  45<H>  ----------------------------<  85  5.4<H>   |  26  |  1.56<H>    Ca    10.0      02 Sep 2023 09:45  Phos  2.4     -  Mg     2.10         TPro  6.6  /  Alb  3.4  /  TBili  4.0<H>  /  DBili  x   /  AST  410<H>  /  ALT  126<H>  /  AlkPhos  624<H>        Alpha Fetoprotein - Tumor Marker (23 @ 06:43)   Alpha Fetoprotein - Tumor Marker: 3.8    Prostate Ca Screen, PSA Total (23 @ 06:43)   Prostate Ca Screen, PSA Total: 3.47    Hepatic Function Panel (23 @ 17:00)   Indirect Reacting Bilirubin: 0.8 mg/dL  Protein Total: 6.9 g/dL  Albumin: 3.8 g/dL  Bilirubin Total: 3.6 mg/dL  Bilirubin Direct: 2.8 mg/dL  Alkaline Phosphatase: 648 U/L  Aspartate Aminotransferase (AST/SGOT): 431 U/L  Alanine Aminotransferase (ALT/SGPT): 124 U/L  Uric Acid (23 @ 17:00)   Uric Acid: 12.9 mg/dL    Carcinoembryonic Antigen (23 @ 17:00)   Carcinoembryonic Antigen: 164.0 ng/mL    D-Dimer Assay, Quantitative (23 @ 17:00)   D-Dimer Assay, Quantitative: 5418    Lipase (23 @ 21:25)   Lipase: 83 U/L    Lactate, Blood (23 @ 06:00)   Lactate, Blood: 1.8 mmol/L    Urinalysis Basic - ( 30 Aug 2023 02:55 )    Color: Dark Yellow / Appearance: Cloudy / S.022 / pH: x  Gluc: x / Ketone: Trace mg/dL  / Bili: Small / Urobili: 1.0 mg/dL   Blood: x / Protein: 100 mg/dL / Nitrite: Negative   Leuk Esterase: Negative / RBC: 5 /HPF / WBC 4 /HPF   Sq Epi: x / Non Sq Epi: 3 /HPF / Bacteria: Negative /HPF    RADIOLOGY & ADDITIONAL TESTS:    < from: MR MRCP No Cont (23 @ 14:51) >    FINDINGS:  LOWER CHEST: Within normal limits.    LIVER: Hepatomegaly . Redemonstrated innumerable bilobar hepatic   metastases, similar to the prior MRI 8/15/2023  BILE DUCTS: Mild intrahepatic biliary ductal dilatation. Narrowing of the   proximal common bile duct dueto extrinsic compression from the enlarged   liver.  GALLBLADDER: Within normal limits.  SPLEEN: Within normal limits.  PANCREAS: Within normal limits.  ADRENALS: Within normal limits.  KIDNEYS/URETERS: Left renal parapelvic cysts.    VISUALIZED PORTIONS:  BOWEL: Within normal limits.  PERITONEUM: Trace ascites.  VESSELS: Within normal limits.  RETROPERITONEUM/LYMPH NODES: No lymphadenopathy.  ABDOMINAL WALL: Within normal limits.  BONES: Multiple enhancing osseous lesions in the pelvis, similar to the   prior MRI.    IMPRESSION:  Hepatomegaly with diffuse metastatic disease, similar to prior MRI. Mild   intrahepatic biliary ductal dilatation. Narrowing of the proximal common   bile duct due to extrinsic compression from the enlarged liver.    Osseous metastatic disease.    < end of copied text >    < from: CT Abdomen and Pelvis No Cont (23 @ 22:38) >  IMPRESSION:  No hydronephrosis or stone.  Redemonstration of hepatomegaly with presumed innumerable hepatic   metastatic lesions.  Trace ascites and mild anasarca.    < end of copied text >

## 2023-09-02 NOTE — PROGRESS NOTE ADULT - PROBLEM SELECTOR PLAN 1
- ISO newly found metastatic cancer to liver, lung, bone  - Severe generalized weakness, pt was scared to go home following IR biopsy 8/29, sent straight to [facility]. Minimal appetite, fatigue, unintentional weight loss. Denies dysphagia or odynophagia but difficulty initiating swallow and feels like food gets "stuck at the top of stomach", leading him to feel full easily and eat minimally.   - Speech + swallow eval with cineesophagram - recommending minced and moist with mildly thick liquids. Patient has silent aspiration; will potentially consider neuro eval for unknown etiology of dysphagia  - Per GI, consider NGT placement if pt not meeting caloric needs. Pt and family amenable, questions about NGT answered. Pt willing to have it placed despite discomfort as priority is building up strength in order to tolerate any sort of recommended cancer treatment regimen.   - Aspiration precautions   - PT eval --> rec home PT - ISO newly found metastatic cancer to liver, lung, bone  - Severe generalized weakness, pt was scared to go home following IR biopsy 8/29, sent straight to [facility]. Minimal appetite, fatigue, unintentional weight loss. Denies dysphagia or odynophagia but difficulty initiating swallow and feels like food gets "stuck at the top of stomach", leading him to feel full easily and eat minimally. Says that intermittent abd pain is UNRELATED to food; avoiding food more so because of his early satiety.  - Speech + swallow eval with cineesophagram - recommending minced and moist with mildly thick liquids. Patient has silent aspiration; will potentially consider neuro eval for unknown etiology of dysphagia  - Considered barium esophagram + small bowel series however was deemed poor option 2/2 aspiration and pt's inability to drink large volumes of liquid at once  - Per GI, consider NGT placement if pt not meeting caloric needs. Pt and family amenable, questions about NGT answered. Pt willing to have it placed despite discomfort as priority is building up strength in order to tolerate any sort of recommended cancer treatment regimen.   - Aspiration precautions   - PT eval --> rec home PT

## 2023-09-02 NOTE — PROGRESS NOTE ADULT - PROBLEM SELECTOR PLAN 8
Diet: Minced and moist with mildly thickened liquids + Ensure; nutrition services consult placed for assistance with meeting nutritional needs given pt's low BMI. Pt and family interested in NGT.  DVT ppx: SubQ heparin   Code Status: Extensive GOC discussion with pt at bedside. Says he has a lot to live for including family, grandkids and wants "everything done to keep him alive" including CPR, intubation, and pressors. Will consult palliative for assistance with nutritional management and pain control, although likely will be more useful once primary diagnosis established and prognosis is more clear  Dispo: Pending clinical course, now doing more onc w/u inpatient Diet: Minced and moist with mildly thickened liquids + Ensure; nutrition services consulted for assistance with meeting nutritional needs given pt's low BMI. Pt and family interested in NGT. Risks of NGT discussed w pt and family including infection and aspiration, as well that it is a temporary measure i.e. pt cannot go home with it.   DVT ppx: SubQ heparin   Code Status: Extensive GOC discussion with pt at bedside. Says he has a lot to live for including family, grandkids and wants "everything done to keep him alive" including CPR, intubation, and pressors. Palliative made aware of pt but not formally consulted at this time. Likely will be more useful once primary diagnosis established and prognosis is more clear; pt's pain is intermittent well managed at this time   Dispo: Pending clinical course

## 2023-09-02 NOTE — PROGRESS NOTE ADULT - PROBLEM SELECTOR PLAN 3
- BUN/Cr 46/1.67, likely pre-renal ISO poor po intake. Cr improved from 1.81 --> 1.67  - Getting LR 75 cc/hr, BUN/ Cr stable  - Avoid nephrotoxins, renally dose meds - BUN/Cr 46/1.67 on admission likely pre-renal ISO poor po intake. Cr continuing to improve slightly, 1.53 9/2  - Continue LR 75 cc/hr  - Avoid nephrotoxins, renally dose meds

## 2023-09-02 NOTE — PROGRESS NOTE ADULT - PROBLEM SELECTOR PLAN 5
- Uric acid 12.9 likely ISO metastatic cancer  - Started allopurinol 100mg (renally dosed) daily  - Continue IVF for renal protection; pt's family also requested IVF bc they feel he is not adequately hydrating by mouth

## 2023-09-02 NOTE — PROGRESS NOTE ADULT - PROBLEM SELECTOR PLAN 6
- K 5.7 on outpatient labs 8/29  - K 5.2 9/1 --> RESOLVED - K 5.9 this AM, hemolyzed --> repeat 5.4  - Lokelma 5mg given, will repeat

## 2023-09-03 LAB
ALBUMIN SERPL ELPH-MCNC: 3.4 G/DL — SIGNIFICANT CHANGE UP (ref 3.3–5)
ALP SERPL-CCNC: 645 U/L — HIGH (ref 40–120)
ALT FLD-CCNC: 126 U/L — HIGH (ref 4–41)
ANION GAP SERPL CALC-SCNC: 12 MMOL/L — SIGNIFICANT CHANGE UP (ref 7–14)
AST SERPL-CCNC: 463 U/L — HIGH (ref 4–40)
BASOPHILS # BLD AUTO: 0.02 K/UL — SIGNIFICANT CHANGE UP (ref 0–0.2)
BASOPHILS NFR BLD AUTO: 0.2 % — SIGNIFICANT CHANGE UP (ref 0–2)
BILIRUB SERPL-MCNC: 4.6 MG/DL — HIGH (ref 0.2–1.2)
BUN SERPL-MCNC: 41 MG/DL — HIGH (ref 7–23)
CALCIUM SERPL-MCNC: 9.9 MG/DL — SIGNIFICANT CHANGE UP (ref 8.4–10.5)
CHLORIDE SERPL-SCNC: 98 MMOL/L — SIGNIFICANT CHANGE UP (ref 98–107)
CO2 SERPL-SCNC: 24 MMOL/L — SIGNIFICANT CHANGE UP (ref 22–31)
CREAT SERPL-MCNC: 1.45 MG/DL — HIGH (ref 0.5–1.3)
EGFR: 48 ML/MIN/1.73M2 — LOW
EOSINOPHIL # BLD AUTO: 0.09 K/UL — SIGNIFICANT CHANGE UP (ref 0–0.5)
EOSINOPHIL NFR BLD AUTO: 1.1 % — SIGNIFICANT CHANGE UP (ref 0–6)
GLUCOSE SERPL-MCNC: 80 MG/DL — SIGNIFICANT CHANGE UP (ref 70–99)
HCT VFR BLD CALC: 32 % — LOW (ref 39–50)
HGB BLD-MCNC: 10.7 G/DL — LOW (ref 13–17)
IANC: 6.61 K/UL — SIGNIFICANT CHANGE UP (ref 1.8–7.4)
IMM GRANULOCYTES NFR BLD AUTO: 0.9 % — SIGNIFICANT CHANGE UP (ref 0–0.9)
LYMPHOCYTES # BLD AUTO: 0.55 K/UL — LOW (ref 1–3.3)
LYMPHOCYTES # BLD AUTO: 6.8 % — LOW (ref 13–44)
MAGNESIUM SERPL-MCNC: 2.1 MG/DL — SIGNIFICANT CHANGE UP (ref 1.6–2.6)
MCHC RBC-ENTMCNC: 31.8 PG — SIGNIFICANT CHANGE UP (ref 27–34)
MCHC RBC-ENTMCNC: 33.4 GM/DL — SIGNIFICANT CHANGE UP (ref 32–36)
MCV RBC AUTO: 95.2 FL — SIGNIFICANT CHANGE UP (ref 80–100)
MONOCYTES # BLD AUTO: 0.75 K/UL — SIGNIFICANT CHANGE UP (ref 0–0.9)
MONOCYTES NFR BLD AUTO: 9.3 % — SIGNIFICANT CHANGE UP (ref 2–14)
NEUTROPHILS # BLD AUTO: 6.61 K/UL — SIGNIFICANT CHANGE UP (ref 1.8–7.4)
NEUTROPHILS NFR BLD AUTO: 81.7 % — HIGH (ref 43–77)
NRBC # BLD: 0 /100 WBCS — SIGNIFICANT CHANGE UP (ref 0–0)
NRBC # FLD: 0 K/UL — SIGNIFICANT CHANGE UP (ref 0–0)
PHOSPHATE SERPL-MCNC: 2.3 MG/DL — LOW (ref 2.5–4.5)
PLATELET # BLD AUTO: 187 K/UL — SIGNIFICANT CHANGE UP (ref 150–400)
POTASSIUM SERPL-MCNC: 4.7 MMOL/L — SIGNIFICANT CHANGE UP (ref 3.5–5.3)
POTASSIUM SERPL-SCNC: 4.7 MMOL/L — SIGNIFICANT CHANGE UP (ref 3.5–5.3)
PROT SERPL-MCNC: 6.3 G/DL — SIGNIFICANT CHANGE UP (ref 6–8.3)
RBC # BLD: 3.36 M/UL — LOW (ref 4.2–5.8)
RBC # FLD: 14.4 % — SIGNIFICANT CHANGE UP (ref 10.3–14.5)
SODIUM SERPL-SCNC: 134 MMOL/L — LOW (ref 135–145)
WBC # BLD: 8.09 K/UL — SIGNIFICANT CHANGE UP (ref 3.8–10.5)
WBC # FLD AUTO: 8.09 K/UL — SIGNIFICANT CHANGE UP (ref 3.8–10.5)

## 2023-09-03 PROCEDURE — 99232 SBSQ HOSP IP/OBS MODERATE 35: CPT | Mod: GC

## 2023-09-03 RX ORDER — SODIUM CHLORIDE 9 MG/ML
1000 INJECTION, SOLUTION INTRAVENOUS
Refills: 0 | Status: DISCONTINUED | OUTPATIENT
Start: 2023-09-03 | End: 2023-09-06

## 2023-09-03 RX ORDER — SODIUM,POTASSIUM PHOSPHATES 278-250MG
1 POWDER IN PACKET (EA) ORAL EVERY 6 HOURS
Refills: 0 | Status: COMPLETED | OUTPATIENT
Start: 2023-09-03 | End: 2023-09-03

## 2023-09-03 RX ADMIN — Medication 1 PACKET(S): at 09:40

## 2023-09-03 RX ADMIN — HEPARIN SODIUM 5000 UNIT(S): 5000 INJECTION INTRAVENOUS; SUBCUTANEOUS at 18:40

## 2023-09-03 RX ADMIN — HYDROMORPHONE HYDROCHLORIDE 2 MILLIGRAM(S): 2 INJECTION INTRAMUSCULAR; INTRAVENOUS; SUBCUTANEOUS at 16:37

## 2023-09-03 RX ADMIN — HEPARIN SODIUM 5000 UNIT(S): 5000 INJECTION INTRAVENOUS; SUBCUTANEOUS at 07:04

## 2023-09-03 RX ADMIN — SODIUM CHLORIDE 75 MILLILITER(S): 9 INJECTION, SOLUTION INTRAVENOUS at 18:59

## 2023-09-03 RX ADMIN — Medication 1 PACKET(S): at 18:40

## 2023-09-03 RX ADMIN — Medication 100 MILLIGRAM(S): at 09:40

## 2023-09-03 RX ADMIN — MIRTAZAPINE 15 MILLIGRAM(S): 45 TABLET, ORALLY DISINTEGRATING ORAL at 22:47

## 2023-09-03 NOTE — PROGRESS NOTE ADULT - PROBLEM SELECTOR PLAN 7
- Pt continues to be normotensive   - Hold lisinopril 5 mg ISO GREG Diet: Recomendaton was minced and moist with mildly thickened liquids + Ensure, however today 9/3 pt strongly requesting regular diet due to taste/texture preference and goal to attempt to gain weight and increase caloric intake. Risks discussed, pt expressed understanding but still prefers regular diet.    DVT ppx: SubQ heparin   Code Status: Extensive GOC discussion with pt at bedside. Says he has a lot to live for including family, grandkids and wants "everything done to keep him alive" including CPR, intubation, and pressors. Palliative made aware of pt but not formally consulted at this time. Likely will be more useful once primary diagnosis established and prognosis is more clear; pt's pain is intermittent well managed at this time   Dispo: d/c home with PT and potentially additional assistance to continue onc workup and management

## 2023-09-03 NOTE — PROGRESS NOTE ADULT - PROBLEM SELECTOR PLAN 4
Reyes, Abigail M(Resident) - stable at 132 this AM 9/2 - Uric acid 12.9 likely ISO metastatic cancer  - Started allopurinol 100mg (renally dosed) daily  - Continue IVF for renal protection; pt's family also requested IVF bc they feel he is not adequately hydrating by mouth

## 2023-09-03 NOTE — PROGRESS NOTE ADULT - PROBLEM SELECTOR PLAN 3
- BUN/Cr 46/1.67 on admission likely pre-renal ISO poor po intake. Cr continuing to improve slightly, 1.53 9/2  - Continue LR 75 cc/hr  - Avoid nephrotoxins, renally dose meds - BUN/Cr 46/1.67 on admission likely pre-renal ISO poor po intake. Cr continuing to improve slightly, 1.45 9/3  - Continue LR 75 cc/hr  - Avoid nephrotoxins, renally dose meds

## 2023-09-03 NOTE — PROGRESS NOTE ADULT - PROBLEM SELECTOR PLAN 6
- K 5.9 this AM, hemolyzed --> repeat 5.4  - Lokelma 5mg given, will repeat - Pt continues to be normotensive   - Hold lisinopril 5 mg ISO GREG

## 2023-09-03 NOTE — PROGRESS NOTE ADULT - PROBLEM SELECTOR PLAN 5
- Uric acid 12.9 likely ISO metastatic cancer  - Started allopurinol 100mg (renally dosed) daily  - Continue IVF for renal protection; pt's family also requested IVF bc they feel he is not adequately hydrating by mouth - 4.7 this AM 9/3, RESOLVED

## 2023-09-03 NOTE — PROGRESS NOTE ADULT - PROBLEM SELECTOR PLAN 1
- ISO newly found metastatic cancer to liver, lung, bone  - Severe generalized weakness, pt was scared to go home following IR biopsy 8/29, sent straight to [facility]. Minimal appetite, fatigue, unintentional weight loss. Denies dysphagia or odynophagia but difficulty initiating swallow and feels like food gets "stuck at the top of stomach", leading him to feel full easily and eat minimally. Says that intermittent abd pain is UNRELATED to food; avoiding food more so because of his early satiety.  - Speech + swallow eval with cineesophagram - recommending minced and moist with mildly thick liquids. Patient has silent aspiration; will potentially consider neuro eval for unknown etiology of dysphagia  - Considered barium esophagram + small bowel series however was deemed poor option 2/2 aspiration and pt's inability to drink large volumes of liquid at once  - Per GI, consider NGT placement if pt not meeting caloric needs. Pt and family amenable, questions about NGT answered. Pt willing to have it placed despite discomfort as priority is building up strength in order to tolerate any sort of recommended cancer treatment regimen.   - Aspiration precautions   - PT eval --> rec home PT - ISO newly found lesions (presumably cancer) to liver and pelvic bones, likely lungs   - Severe generalized weakness, pt was scared to go home following IR biopsy 8/29, sent straight to [facility]. Minimal appetite, fatigue, unintentional weight loss. Denies dysphagia or odynophagia but difficulty initiating swallow and feels like food gets "stuck at the top of stomach", leading him to feel full easily and eat minimally. Says that intermittent abd pain is UNRELATED to food; avoiding food more so because of his early satiety.  - Speech + swallow eval with cineesophagram - recommending minced and moist with mildly thick liquids 2/2 silent aspiration. Pt strongly requesting regular diet despite this rec due to poor taste/dislike for M+M diet, expresses understanding of risks but emphasizes priority is increasing caloric intake.  - Considered barium esophagram + small bowel series however was deemed poor option 2/2 aspiration and pt's inability to drink large volumes of liquid at once  - Discussion w/ family yesterday that NGT carries risk of aspiration, would be poor choice for pt at this time and he cannot go home with it; d/c is goal so onc workup can be continued.   - PT eval --> rec home PT

## 2023-09-03 NOTE — PROGRESS NOTE ADULT - SUBJECTIVE AND OBJECTIVE BOX
Malka Akers MD  PGY1  Preferred contact via [employer] Teams    Patient is a 83y old  Male who presents with a chief complaint of Hyperkalemia, GREG (29 Aug 2023 15:51)    SUBJECTIVE / OVERNIGHT EVENTS: No ON events. Pt says he had a rough day yesterday because he was waiting in the hallway for his MRI for multiple hours and had a lot of visitors, so he was even more exhausted than usual. Does not feel like Remeron made much of a difference in his appetite. +Diarrhea. Woke up with worsening abdominal pain, more RUQ>LUQ but generally in a band-like distribution around upper abdomen.      MEDICATIONS  (STANDING):  heparin   Injectable 5000 Unit(s) SubCutaneous every 12 hours  lactated ringers. 1000 milliLiter(s) (75 mL/Hr) IV Continuous <Continuous>    MEDICATIONS  (PRN):  acetaminophen     Tablet .. 650 milliGRAM(s) Oral every 6 hours PRN Moderate Pain (4 - 6)    REVIEW OF SYSTEMS:    CONSTITUTIONAL: No weakness, fevers or chills  EYES/ENT: No visual changes;  No vertigo or throat pain. +Dry mouth   NECK: No pain or stiffness  RESPIRATORY: No cough, wheezing, hemoptysis; No shortness of breath  CARDIOVASCULAR: No chest pain or palpitations  GASTROINTESTINAL: +Mild-to-moderate upper abdominal pain, early satiety. No nausea, vomiting, or hematemesis; +Diarrhea. No melena or hematochezia.  GENITOURINARY: No dysuria, frequency or hematuria  NEUROLOGICAL: No numbness or weakness  SKIN: No itching, no rash    PHYSICAL EXAM:    Vital Signs Last 24 Hrs  T(C): 36.5 (03 Sep 2023 05:30), Max: 37 (02 Sep 2023 21:08)  T(F): 97.7 (03 Sep 2023 05:30), Max: 98.6 (02 Sep 2023 21:08)  HR: 77 (03 Sep 2023 05:30) (60 - 77)  BP: 126/86 (03 Sep 2023 05:30) (102/68 - 126/86)  RR: 18 (03 Sep 2023 05:30) (17 - 18)  SpO2: 100% (03 Sep 2023 05:30) (98% - 100%)    Parameters below as of 03 Sep 2023 05:30  Patient On (Oxygen Delivery Method): room air      GENERAL: Extremely frail, resting in bed comfortably   HEAD:  Atraumatic, normocephalic, Temporal wasting  EYES: EOMI, PERRLA, conjunctiva and sclera clear, no scleral icterus   ENT: Dry mucous membranes, no jaundice underneath tongue  NECK: Supple, no JVD  HEART: Regular rate and rhythm, no murmurs, rubs, or gallops  LUNGS: Unlabored respirations.  Clear to auscultation bilaterally, no crackles, wheezing, or rhonchi  ABDOMEN: No tenderness to palpation in all 4 quadrants (no abdominal pain at time of exam). Soft, ND, no rebound, no guarding; no palpable masses  EXTREMITIES: 2+ peripheral pulses bilaterally. No clubbing, cyanosis, or edema  NERVOUS SYSTEM:  A&Ox3, no focal deficits   SKIN: No rashes or lesions, no jaundice     LABS:                                    11.2   7.96  )-----------( 175      ( 02 Sep 2023 06:40 )             34.1         132<L>  |  93<L>  |  45<H>  ----------------------------<  85  5.4<H>   |  26  |  1.56<H>    Ca    10.0      02 Sep 2023 09:45  Phos  2.4       Mg     2.10         TPro  6.6  /  Alb  3.4  /  TBili  4.0<H>  /  DBili  x   /  AST  410<H>  /  ALT  126<H>  /  AlkPhos  624<H>        Alpha Fetoprotein - Tumor Marker (23 @ 06:43)   Alpha Fetoprotein - Tumor Marker: 3.8    Prostate Ca Screen, PSA Total (23 @ 06:43)   Prostate Ca Screen, PSA Total: 3.47    Hepatic Function Panel (23 @ 17:00)   Indirect Reacting Bilirubin: 0.8 mg/dL  Protein Total: 6.9 g/dL  Albumin: 3.8 g/dL  Bilirubin Total: 3.6 mg/dL  Bilirubin Direct: 2.8 mg/dL  Alkaline Phosphatase: 648 U/L  Aspartate Aminotransferase (AST/SGOT): 431 U/L  Alanine Aminotransferase (ALT/SGPT): 124 U/L  Uric Acid (23 @ 17:00)   Uric Acid: 12.9 mg/dL    Carcinoembryonic Antigen (23 @ 17:00)   Carcinoembryonic Antigen: 164.0 ng/mL    D-Dimer Assay, Quantitative (23 @ 17:00)   D-Dimer Assay, Quantitative: 5418    Lipase (23 @ 21:25)   Lipase: 83 U/L    Lactate, Blood (23 @ 06:00)   Lactate, Blood: 1.8 mmol/L    Urinalysis Basic - ( 30 Aug 2023 02:55 )    Color: Dark Yellow / Appearance: Cloudy / S.022 / pH: x  Gluc: x / Ketone: Trace mg/dL  / Bili: Small / Urobili: 1.0 mg/dL   Blood: x / Protein: 100 mg/dL / Nitrite: Negative   Leuk Esterase: Negative / RBC: 5 /HPF / WBC 4 /HPF   Sq Epi: x / Non Sq Epi: 3 /HPF / Bacteria: Negative /HPF    RADIOLOGY & ADDITIONAL TESTS:    < from: MR MRCP No Cont (23 @ 14:51) >    FINDINGS:  LOWER CHEST: Within normal limits.    LIVER: Hepatomegaly . Redemonstrated innumerable bilobar hepatic   metastases, similar to the prior MRI 8/15/2023  BILE DUCTS: Mild intrahepatic biliary ductal dilatation. Narrowing of the   proximal common bile duct dueto extrinsic compression from the enlarged   liver.  GALLBLADDER: Within normal limits.  SPLEEN: Within normal limits.  PANCREAS: Within normal limits.  ADRENALS: Within normal limits.  KIDNEYS/URETERS: Left renal parapelvic cysts.    VISUALIZED PORTIONS:  BOWEL: Within normal limits.  PERITONEUM: Trace ascites.  VESSELS: Within normal limits.  RETROPERITONEUM/LYMPH NODES: No lymphadenopathy.  ABDOMINAL WALL: Within normal limits.  BONES: Multiple enhancing osseous lesions in the pelvis, similar to the   prior MRI.    IMPRESSION:  Hepatomegaly with diffuse metastatic disease, similar to prior MRI. Mild   intrahepatic biliary ductal dilatation. Narrowing of the proximal common   bile duct due to extrinsic compression from the enlarged liver.    Osseous metastatic disease.    < end of copied text >    < from: CT Abdomen and Pelvis No Cont (23 @ 22:38) >  IMPRESSION:  No hydronephrosis or stone.  Redemonstration of hepatomegaly with presumed innumerable hepatic   metastatic lesions.  Trace ascites and mild anasarca.    < end of copied text >       Malka Akers MD  PGY1  Preferred contact via [employer] Teams    Patient is a 83y old  Male who presents with a chief complaint of Hyperkalemia, GREG (29 Aug 2023 15:51)    SUBJECTIVE / OVERNIGHT EVENTS: No ON events. Pt requesting regular diet, understands aspiration risk, but feels he'd be able to eat more if he liked the taste of the food better. Also encouraged pt to tell family to bring food from home. Confirmed pt's appt with Dr. Milagros Sol (hepatobiliary onc) in on Thurs.  at 11:30AM. Explained to pt that oncology workup and treatment is typically not done inpatient so goal is to d/c patient so that he can continue this process outpatient. He feels he is in "no shape" for d/c at this time but is hopeful for d/c as the weak progresses so he can make his appt with Dr. Sol. Abdominal pain is intermittent and unchanged in character.     MEDICATIONS  (STANDING):  heparin   Injectable 5000 Unit(s) SubCutaneous every 12 hours  lactated ringers. 1000 milliLiter(s) (75 mL/Hr) IV Continuous <Continuous>    MEDICATIONS  (PRN):  acetaminophen     Tablet .. 650 milliGRAM(s) Oral every 6 hours PRN Moderate Pain (4 - 6)    REVIEW OF SYSTEMS:    CONSTITUTIONAL: No weakness, fevers or chills  EYES/ENT: No visual changes;  No vertigo or throat pain.   NECK: No pain or stiffness  RESPIRATORY: No cough, wheezing, hemoptysis; No shortness of breath  CARDIOVASCULAR: No chest pain or palpitations  GASTROINTESTINAL: +Mild-to-moderate upper abdominal pain, early satiety. No nausea, vomiting, or hematemesis. No diarrhea No melena or hematochezia.  GENITOURINARY: No dysuria, frequency or hematuria  NEUROLOGICAL: No numbness or weakness  SKIN: No itching, no rash    PHYSICAL EXAM:    Vital Signs Last 24 Hrs  T(C): 36.5 (03 Sep 2023 05:30), Max: 37 (02 Sep 2023 21:08)  T(F): 97.7 (03 Sep 2023 05:30), Max: 98.6 (02 Sep 2023 21:08)  HR: 77 (03 Sep 2023 05:30) (60 - 77)  BP: 126/86 (03 Sep 2023 05:30) (102/68 - 126/86)  RR: 18 (03 Sep 2023 05:30) (17 - 18)  SpO2: 100% (03 Sep 2023 05:30) (98% - 100%)    Parameters below as of 03 Sep 2023 05:30  Patient On (Oxygen Delivery Method): room air    GENERAL: Extremely frail, resting in bed comfortably   HEAD:  Atraumatic, normocephalic, Temporal wasting  EYES: EOMI, PERRLA, conjunctiva and sclera clear, no scleral icterus   ENT: Dry mucous membranes, no jaundice underneath tongue  NECK: Supple, no JVD  HEART: Regular rate and rhythm, no murmurs, rubs, or gallops  LUNGS: Unlabored respirations.  Clear to auscultation bilaterally, no crackles, wheezing, or rhonchi  ABDOMEN: Mild tenderness to palpation in upper abdomen diffusely. Soft, ND, no rebound, no guarding; no palpable masses  EXTREMITIES: 2+ peripheral pulses bilaterally. No clubbing, cyanosis, or edema  NERVOUS SYSTEM:  A&Ox3, no focal deficits   SKIN: No rashes or lesions, no jaundice     LABS:                                      10.7   8.09  )-----------( 187      ( 03 Sep 2023 07:58 )             32.0     09-03    134<L>  |  98  |  41<H>  ----------------------------<  80  4.7   |  24  |  1.45<H>    Ca    9.9      03 Sep 2023 08:01  Phos  2.3     -  Mg     2.10     -    TPro  6.3  /  Alb  3.4  /  TBili  4.6<H>  /  DBili  x   /  AST  463<H>  /  ALT  126<H>  /  AlkPhos  645<H>          Alpha Fetoprotein - Tumor Marker (23 @ 06:43)   Alpha Fetoprotein - Tumor Marker: 3.8    Prostate Ca Screen, PSA Total (23 @ 06:43)   Prostate Ca Screen, PSA Total: 3.47    Hepatic Function Panel (23 @ 17:00)   Indirect Reacting Bilirubin: 0.8 mg/dL  Protein Total: 6.9 g/dL  Albumin: 3.8 g/dL  Bilirubin Total: 3.6 mg/dL  Bilirubin Direct: 2.8 mg/dL  Alkaline Phosphatase: 648 U/L  Aspartate Aminotransferase (AST/SGOT): 431 U/L  Alanine Aminotransferase (ALT/SGPT): 124 U/L  Uric Acid (23 @ 17:00)   Uric Acid: 12.9 mg/dL    Carcinoembryonic Antigen (23 @ 17:00)   Carcinoembryonic Antigen: 164.0 ng/mL    D-Dimer Assay, Quantitative (23 @ 17:00)   D-Dimer Assay, Quantitative: 5418    Lipase (23 @ 21:25)   Lipase: 83 U/L    Lactate, Blood (23 @ 06:00)   Lactate, Blood: 1.8 mmol/L    Urinalysis Basic - ( 30 Aug 2023 02:55 )    Color: Dark Yellow / Appearance: Cloudy / S.022 / pH: x  Gluc: x / Ketone: Trace mg/dL  / Bili: Small / Urobili: 1.0 mg/dL   Blood: x / Protein: 100 mg/dL / Nitrite: Negative   Leuk Esterase: Negative / RBC: 5 /HPF / WBC 4 /HPF   Sq Epi: x / Non Sq Epi: 3 /HPF / Bacteria: Negative /HPF    RADIOLOGY & ADDITIONAL TESTS:    < from: MR MRCP No Cont (23 @ 14:51) >    FINDINGS:  LOWER CHEST: Within normal limits.    LIVER: Hepatomegaly . Redemonstrated innumerable bilobar hepatic   metastases, similar to the prior MRI 8/15/2023  BILE DUCTS: Mild intrahepatic biliary ductal dilatation. Narrowing of the   proximal common bile duct dueto extrinsic compression from the enlarged   liver.  GALLBLADDER: Within normal limits.  SPLEEN: Within normal limits.  PANCREAS: Within normal limits.  ADRENALS: Within normal limits.  KIDNEYS/URETERS: Left renal parapelvic cysts.    VISUALIZED PORTIONS:  BOWEL: Within normal limits.  PERITONEUM: Trace ascites.  VESSELS: Within normal limits.  RETROPERITONEUM/LYMPH NODES: No lymphadenopathy.  ABDOMINAL WALL: Within normal limits.  BONES: Multiple enhancing osseous lesions in the pelvis, similar to the   prior MRI.    IMPRESSION:  Hepatomegaly with diffuse metastatic disease, similar to prior MRI. Mild   intrahepatic biliary ductal dilatation. Narrowing of the proximal common   bile duct due to extrinsic compression from the enlarged liver.    Osseous metastatic disease.    < end of copied text >    < from: CT Abdomen and Pelvis No Cont (23 @ 22:38) >  IMPRESSION:  No hydronephrosis or stone.  Redemonstration of hepatomegaly with presumed innumerable hepatic   metastatic lesions.  Trace ascites and mild anasarca.    < end of copied text >

## 2023-09-03 NOTE — PROGRESS NOTE ADULT - PROBLEM SELECTOR PLAN 8
Diet: Minced and moist with mildly thickened liquids + Ensure; nutrition services consulted for assistance with meeting nutritional needs given pt's low BMI. Pt and family interested in NGT. Risks of NGT discussed w pt and family including infection and aspiration, as well that it is a temporary measure i.e. pt cannot go home with it.   DVT ppx: SubQ heparin   Code Status: Extensive GOC discussion with pt at bedside. Says he has a lot to live for including family, grandkids and wants "everything done to keep him alive" including CPR, intubation, and pressors. Palliative made aware of pt but not formally consulted at this time. Likely will be more useful once primary diagnosis established and prognosis is more clear; pt's pain is intermittent well managed at this time   Dispo: Pending clinical course

## 2023-09-03 NOTE — PROGRESS NOTE ADULT - ATTENDING COMMENTS
82 yo gentlemen w/ PMHx HTN, BPH, recent findings of metastatic cancer to lung, liver, and pelvic bone w/ unclear primary source at this point, present with severe generalized weakness c/b electrolyte derangements including hyponatremia and hyperkalemia along w/ GREG, most likely due to pre-renal cause secondary to poor po intake.    Family agrees to hold off NGT as discussed on 9/2 extensively. Patient is eating a few bites every hour including ice cream.  Continue the rest of the work up and management as stated above.

## 2023-09-03 NOTE — PROGRESS NOTE ADULT - PROBLEM SELECTOR PLAN 2
- Known liver mets (vs primary tumor?) s/p biopsy with IR outpatient . Will f/u results - onc will email to expedite results   - CTAP on admission w/ hepatomegaly with presumed innumerable hepatic metastatic lesions, trace ascites, mild anasarca  - TBili increasin.0 9/ up from 1.0 at beginning of August.   - Oncology consulted: Elevated CEA (164), d-dimer (5418), LDH (621). Normal PSA, CA9-19, AFP tumor marker, Hepatitis non-reactive.  - Concern for possible biliary obstruction 2/2 rapidly elevated TBili and AlkPhos compared to earlier this month; GI consulted, recommending repeat MRCP, pending.   - PSA 3.5 in 2023, recent colonoscopy several years ago w/ benign polyps  - Appreciate onc recs - Known liver mets (vs primary tumor?) s/p biopsy with IR outpatient 8/29. Will f/u results - onc will email to expedite results   - CTAP on admission w/ hepatomegaly with presumed innumerable hepatic metastatic lesions, trace ascites, mild anasarca  - TBili continuing to increase, 4.6 on 9/3 up from 1.0 at beginning of August.   - Oncology consulted: Elevated CEA (164), d-dimer (5418), LDH (621). Normal PSA, CA9-19, AFP tumor marker, Hepatitis non-reactive.  - Concern for possible biliary obstruction 2/2 rapidly elevated TBili and AlkPhos compared to earlier this month; MRCP w/ slight compression of CBD by liver. Pt w/o Sx of hyperbilirubinemia   - Onc w/o additional recs at this time  - Pt has initial consultation with Dr. Milagros Sol at [facility] Thurs. 9/7 at 11:30Am

## 2023-09-03 NOTE — PROGRESS NOTE ADULT - ASSESSMENT
Pt is an 83 y.o. M w/ PMHx HTN, BPH, recent findings of metastatic cancer to lung, liver, and pelvic bone w/ unclear primary source at this point, present with severe generalized weakness ISO electrolyte derangements including hyponatremia and hyperkalemia along w/ GREG, most likely due to pre-renal cause secondary to poor po intake.    Today 9/2, hyponatremia and GREG continuing to improve, mild hyperkalemia at 5.4, Patient continuing to endorse fatigue and weakness, early satiety and lack of appetite; specifically says he is avoiding eating due to kedar family concerned about nutrition and mentioned NGT. Oncology consulted for management recommendations; workup ongoing. GI consulted for possible biliary obstruction, recommending MRCP, pending. Pt is an 83 y.o. M w/ PMHx HTN, BPH, recent findings of metastatic cancer to lung, liver, and pelvic bone w/ unclear primary source at this point, present with severe generalized weakness ISO electrolyte derangements including hyponatremia and hyperkalemia along w/ GREG, most likely due to pre-renal cause secondary to poor po intake.    Today 9/3, patient continues to report extreme fatigue, poor appetite, and abdominal pain; says it would be impossible for him to function at home as he can barely walk on his own from his bed to bathroom. Requesting switch in diet. Inpatient oncology with no further recs at this time, recommending further outpatient workup and management. GI recs pending; TBili continuing to uptrend, although pt denies symptoms of hyperbilirubinemia such as itching and jaundice.

## 2023-09-04 LAB
ALBUMIN SERPL ELPH-MCNC: 3.5 G/DL — SIGNIFICANT CHANGE UP (ref 3.3–5)
ALP SERPL-CCNC: 677 U/L — HIGH (ref 40–120)
ALT FLD-CCNC: 124 U/L — HIGH (ref 4–41)
ANION GAP SERPL CALC-SCNC: 11 MMOL/L — SIGNIFICANT CHANGE UP (ref 7–14)
AST SERPL-CCNC: 463 U/L — HIGH (ref 4–40)
BASOPHILS # BLD AUTO: 0.03 K/UL — SIGNIFICANT CHANGE UP (ref 0–0.2)
BASOPHILS NFR BLD AUTO: 0.4 % — SIGNIFICANT CHANGE UP (ref 0–2)
BILIRUB SERPL-MCNC: 5.1 MG/DL — HIGH (ref 0.2–1.2)
BUN SERPL-MCNC: 39 MG/DL — HIGH (ref 7–23)
CALCIUM SERPL-MCNC: 10.1 MG/DL — SIGNIFICANT CHANGE UP (ref 8.4–10.5)
CHLORIDE SERPL-SCNC: 99 MMOL/L — SIGNIFICANT CHANGE UP (ref 98–107)
CO2 SERPL-SCNC: 25 MMOL/L — SIGNIFICANT CHANGE UP (ref 22–31)
CREAT SERPL-MCNC: 1.54 MG/DL — HIGH (ref 0.5–1.3)
EGFR: 45 ML/MIN/1.73M2 — LOW
EOSINOPHIL # BLD AUTO: 0.07 K/UL — SIGNIFICANT CHANGE UP (ref 0–0.5)
EOSINOPHIL NFR BLD AUTO: 0.8 % — SIGNIFICANT CHANGE UP (ref 0–6)
GLUCOSE SERPL-MCNC: 86 MG/DL — SIGNIFICANT CHANGE UP (ref 70–99)
HCT VFR BLD CALC: 34.3 % — LOW (ref 39–50)
HGB BLD-MCNC: 11.2 G/DL — LOW (ref 13–17)
IANC: 6.5 K/UL — SIGNIFICANT CHANGE UP (ref 1.8–7.4)
IMM GRANULOCYTES NFR BLD AUTO: 1.4 % — HIGH (ref 0–0.9)
LYMPHOCYTES # BLD AUTO: 0.73 K/UL — LOW (ref 1–3.3)
LYMPHOCYTES # BLD AUTO: 8.8 % — LOW (ref 13–44)
MAGNESIUM SERPL-MCNC: 2 MG/DL — SIGNIFICANT CHANGE UP (ref 1.6–2.6)
MCHC RBC-ENTMCNC: 31.7 PG — SIGNIFICANT CHANGE UP (ref 27–34)
MCHC RBC-ENTMCNC: 32.7 GM/DL — SIGNIFICANT CHANGE UP (ref 32–36)
MCV RBC AUTO: 97.2 FL — SIGNIFICANT CHANGE UP (ref 80–100)
MONOCYTES # BLD AUTO: 0.86 K/UL — SIGNIFICANT CHANGE UP (ref 0–0.9)
MONOCYTES NFR BLD AUTO: 10.3 % — SIGNIFICANT CHANGE UP (ref 2–14)
NEUTROPHILS # BLD AUTO: 6.5 K/UL — SIGNIFICANT CHANGE UP (ref 1.8–7.4)
NEUTROPHILS NFR BLD AUTO: 78.3 % — HIGH (ref 43–77)
NRBC # BLD: 0 /100 WBCS — SIGNIFICANT CHANGE UP (ref 0–0)
NRBC # FLD: 0 K/UL — SIGNIFICANT CHANGE UP (ref 0–0)
PHOSPHATE SERPL-MCNC: 2.5 MG/DL — SIGNIFICANT CHANGE UP (ref 2.5–4.5)
PLATELET # BLD AUTO: 181 K/UL — SIGNIFICANT CHANGE UP (ref 150–400)
POTASSIUM SERPL-MCNC: 5.5 MMOL/L — HIGH (ref 3.5–5.3)
POTASSIUM SERPL-SCNC: 5.5 MMOL/L — HIGH (ref 3.5–5.3)
PROT SERPL-MCNC: 6.5 G/DL — SIGNIFICANT CHANGE UP (ref 6–8.3)
RBC # BLD: 3.53 M/UL — LOW (ref 4.2–5.8)
RBC # FLD: 14.7 % — HIGH (ref 10.3–14.5)
SODIUM SERPL-SCNC: 135 MMOL/L — SIGNIFICANT CHANGE UP (ref 135–145)
URATE SERPL-MCNC: 8.8 MG/DL — SIGNIFICANT CHANGE UP (ref 3.4–8.8)
WBC # BLD: 8.31 K/UL — SIGNIFICANT CHANGE UP (ref 3.8–10.5)
WBC # FLD AUTO: 8.31 K/UL — SIGNIFICANT CHANGE UP (ref 3.8–10.5)

## 2023-09-04 PROCEDURE — 93010 ELECTROCARDIOGRAM REPORT: CPT

## 2023-09-04 PROCEDURE — 99232 SBSQ HOSP IP/OBS MODERATE 35: CPT | Mod: GC

## 2023-09-04 RX ORDER — SODIUM ZIRCONIUM CYCLOSILICATE 10 G/10G
10 POWDER, FOR SUSPENSION ORAL EVERY 8 HOURS
Refills: 0 | Status: COMPLETED | OUTPATIENT
Start: 2023-09-04 | End: 2023-09-04

## 2023-09-04 RX ADMIN — SODIUM ZIRCONIUM CYCLOSILICATE 10 GRAM(S): 10 POWDER, FOR SUSPENSION ORAL at 17:28

## 2023-09-04 RX ADMIN — HYDROMORPHONE HYDROCHLORIDE 2 MILLIGRAM(S): 2 INJECTION INTRAMUSCULAR; INTRAVENOUS; SUBCUTANEOUS at 20:32

## 2023-09-04 RX ADMIN — HEPARIN SODIUM 5000 UNIT(S): 5000 INJECTION INTRAVENOUS; SUBCUTANEOUS at 06:35

## 2023-09-04 RX ADMIN — HEPARIN SODIUM 5000 UNIT(S): 5000 INJECTION INTRAVENOUS; SUBCUTANEOUS at 17:28

## 2023-09-04 RX ADMIN — MIRTAZAPINE 15 MILLIGRAM(S): 45 TABLET, ORALLY DISINTEGRATING ORAL at 22:18

## 2023-09-04 RX ADMIN — SODIUM ZIRCONIUM CYCLOSILICATE 10 GRAM(S): 10 POWDER, FOR SUSPENSION ORAL at 11:20

## 2023-09-04 RX ADMIN — HYDROMORPHONE HYDROCHLORIDE 2 MILLIGRAM(S): 2 INJECTION INTRAMUSCULAR; INTRAVENOUS; SUBCUTANEOUS at 19:32

## 2023-09-04 RX ADMIN — SODIUM ZIRCONIUM CYCLOSILICATE 10 GRAM(S): 10 POWDER, FOR SUSPENSION ORAL at 22:28

## 2023-09-04 RX ADMIN — Medication 100 MILLIGRAM(S): at 11:21

## 2023-09-04 NOTE — PROGRESS NOTE ADULT - PROBLEM SELECTOR PLAN 3
- BUN/Cr 46/1.67 on admission likely pre-renal ISO poor po intake. Cr continuing to improve slightly, 1.45 9/3  - Continue LR 75 cc/hr  - Avoid nephrotoxins, renally dose meds

## 2023-09-04 NOTE — PROGRESS NOTE ADULT - ATTENDING COMMENTS
84 yo gentlemen w/ PMHx HTN, BPH, recent findings of metastatic cancer to lung, liver, and pelvic bone w/ unclear primary source at this point, present with severe generalized weakness c/b electrolyte derangements including hyponatremia and hyperkalemia along w/ GREG, most likely due to pre-renal cause secondary to poor po intake.    Family agrees to hold off NGT as discussed on 9/2 extensively. Patient attempting to eat more to regain strength.   Rising Bili levels noted over the last few days. Will discuss with GI whether any further imaging or workup is recommended.  Continue the rest of the work up and management as stated above.

## 2023-09-04 NOTE — PROGRESS NOTE ADULT - PROBLEM SELECTOR PLAN 1
- ISO newly found lesions (presumably cancer) to liver and pelvic bones, likely lungs   - Severe generalized weakness, pt was scared to go home following IR biopsy 8/29, sent straight to [facility]. Minimal appetite, fatigue, unintentional weight loss. Denies dysphagia or odynophagia but difficulty initiating swallow and feels like food gets "stuck at the top of stomach", leading him to feel full easily and eat minimally. Says that intermittent abd pain is UNRELATED to food; avoiding food more so because of his early satiety.  - Speech + swallow eval with cineesophagram - recommending minced and moist with mildly thick liquids 2/2 silent aspiration. Pt strongly requesting regular diet despite this rec due to poor taste/dislike for M+M diet, expresses understanding of risks but emphasizes priority is increasing caloric intake.  - Considered barium esophagram + small bowel series however was deemed poor option 2/2 aspiration and pt's inability to drink large volumes of liquid at once  - Discussion w/ family yesterday that NGT carries risk of aspiration, would be poor choice for pt at this time and he cannot go home with it; d/c is goal so onc workup can be continued.   - PT eval --> rec home PT

## 2023-09-04 NOTE — PROGRESS NOTE ADULT - PROBLEM SELECTOR PLAN 2
- Known liver mets (vs primary tumor?) s/p biopsy with IR outpatient 8/29. Will f/u results - onc will email to expedite results   - CTAP on admission w/ hepatomegaly with presumed innumerable hepatic metastatic lesions, trace ascites, mild anasarca  - TBili continuing to increase, 4.6 on 9/3 up from 1.0 at beginning of August.   - Oncology consulted: Elevated CEA (164), d-dimer (5418), LDH (621). Normal PSA, CA9-19, AFP tumor marker, Hepatitis non-reactive.  - Concern for possible biliary obstruction 2/2 rapidly elevated TBili and AlkPhos compared to earlier this month; MRCP w/ slight compression of CBD by liver. Pt w/o Sx of hyperbilirubinemia   - Onc w/o additional recs at this time  - Pt has initial consultation with Dr. Milagros Sol at [facility] Thurs. 9/7 at 11:30Am

## 2023-09-04 NOTE — PROGRESS NOTE ADULT - SUBJECTIVE AND OBJECTIVE BOX
***************************************************************  Marco A Melo  (PGY1) Internal Medicine  On TEAMS  ***************************************************************    PROGRESS NOTE:     Patient is a 83y old  Male who presents with a chief complaint of Hyperkalemia, GREG (03 Sep 2023 07:13)      SUBJECTIVE / OVERNIGHT EVENTS:      MEDICATIONS  (STANDING):  allopurinol 100 milliGRAM(s) Oral daily  heparin   Injectable 5000 Unit(s) SubCutaneous every 12 hours  lactated ringers. 1000 milliLiter(s) (75 mL/Hr) IV Continuous <Continuous>  mirtazapine 15 milliGRAM(s) Oral at bedtime    MEDICATIONS  (PRN):  acetaminophen     Tablet .. 650 milliGRAM(s) Oral every 6 hours PRN Moderate Pain (4 - 6)  HYDROmorphone   Tablet 2 milliGRAM(s) Oral every 6 hours PRN Severe Pain (7 - 10)  polyethylene glycol 3350 17 Gram(s) Oral daily PRN Constipation  senna 2 Tablet(s) Oral at bedtime PRN Constipation      CAPILLARY BLOOD GLUCOSE    I&O's Summary    02 Sep 2023 07:01  -  03 Sep 2023 07:00  --------------------------------------------------------  IN: 825 mL / OUT: 0 mL / NET: 825 mL    03 Sep 2023 07:01  -  04 Sep 2023 06:28  --------------------------------------------------------  IN: 475 mL / OUT: 450 mL / NET: 25 mL    PHYSICAL EXAM:  Vital Signs Last 24 Hrs  T(C): 36.7 (03 Sep 2023 22:52), Max: 36.7 (03 Sep 2023 22:52)  T(F): 98 (03 Sep 2023 22:52), Max: 98 (03 Sep 2023 22:52)  HR: 51 (03 Sep 2023 22:52) (51 - 67)  BP: 118/78 (03 Sep 2023 22:52) (118/78 - 124/72)  BP(mean): --  RR: 18 (03 Sep 2023 22:52) (18 - 18)  SpO2: 100% (03 Sep 2023 22:52) (99% - 100%)    Parameters below as of 03 Sep 2023 22:52  Patient On (Oxygen Delivery Method): room air        General : NAD  CV: RRR no R/M/G  Lungs: CTAB  Abd : Soft, non-tender, non-distended  Extremities : No LE Edema  Neuro : A&Ox3    LABS:                        10.7   8.09  )-----------( 187      ( 03 Sep 2023 07:58 )             32.0     09-03    134<L>  |  98  |  41<H>  ----------------------------<  80  4.7   |  24  |  1.45<H>    Ca    9.9      03 Sep 2023 08:01  Phos  2.3     09-03  Mg     2.10     09-03    TPro  6.3  /  Alb  3.4  /  TBili  4.6<H>  /  DBili  x   /  AST  463<H>  /  ALT  126<H>  /  AlkPhos  645<H>  09-03    Urinalysis Basic - ( 03 Sep 2023 08:01 )    Color: x / Appearance: x / SG: x / pH: x  Gluc: 80 mg/dL / Ketone: x  / Bili: x / Urobili: x   Blood: x / Protein: x / Nitrite: x   Leuk Esterase: x / RBC: x / WBC x   Sq Epi: x / Non Sq Epi: x / Bacteria: x    Medications (Standing)  MEDICATIONS  (STANDING):  allopurinol 100 milliGRAM(s) Oral daily  heparin   Injectable 5000 Unit(s) SubCutaneous every 12 hours  lactated ringers. 1000 milliLiter(s) (75 mL/Hr) IV Continuous <Continuous>  mirtazapine 15 milliGRAM(s) Oral at bedtime        COORDINATION OF CARE:  Care Discussed with Consultants/Other Providers [Y/N]:  Prior or Outpatient Records Reviewed [Y/N]:   ***************************************************************  Marco A Melo  (PGY1) Internal Medicine  On TEAMS  ***************************************************************    PROGRESS NOTE:     Patient is a 83y old  Male who presents with a chief complaint of Hyperkalemia, GREG (03 Sep 2023 07:13)      SUBJECTIVE / OVERNIGHT EVENTS: Patient seen at the bedside, no acute overnight events.   Poor appetite and early satiety instructed to continue drinks. Denies n/v.  No BM for 3 days.   Pain well controlled with current regiemen.   Patient denies fever, chills chest pain, shortness of breath, abdominal pain.   Walking with PT with walker.     MEDICATIONS  (STANDING):  allopurinol 100 milliGRAM(s) Oral daily  heparin   Injectable 5000 Unit(s) SubCutaneous every 12 hours  lactated ringers. 1000 milliLiter(s) (75 mL/Hr) IV Continuous <Continuous>  mirtazapine 15 milliGRAM(s) Oral at bedtime    MEDICATIONS  (PRN):  acetaminophen     Tablet .. 650 milliGRAM(s) Oral every 6 hours PRN Moderate Pain (4 - 6)  HYDROmorphone   Tablet 2 milliGRAM(s) Oral every 6 hours PRN Severe Pain (7 - 10)  polyethylene glycol 3350 17 Gram(s) Oral daily PRN Constipation  senna 2 Tablet(s) Oral at bedtime PRN Constipation      CAPILLARY BLOOD GLUCOSE    I&O's Summary    02 Sep 2023 07:01  -  03 Sep 2023 07:00  --------------------------------------------------------  IN: 825 mL / OUT: 0 mL / NET: 825 mL    03 Sep 2023 07:01  -  04 Sep 2023 06:28  --------------------------------------------------------  IN: 475 mL / OUT: 450 mL / NET: 25 mL    PHYSICAL EXAM:  Vital Signs Last 24 Hrs  T(C): 36.7 (03 Sep 2023 22:52), Max: 36.7 (03 Sep 2023 22:52)  T(F): 98 (03 Sep 2023 22:52), Max: 98 (03 Sep 2023 22:52)  HR: 51 (03 Sep 2023 22:52) (51 - 67)  BP: 118/78 (03 Sep 2023 22:52) (118/78 - 124/72)  BP(mean): --  RR: 18 (03 Sep 2023 22:52) (18 - 18)  SpO2: 100% (03 Sep 2023 22:52) (99% - 100%)    Parameters below as of 03 Sep 2023 22:52  Patient On (Oxygen Delivery Method): room air    General : NAD, Clear conjunctiva, Cachectic in bed.    CV: RRR no R/M/G  Lungs: CTAB  Abd : Soft, non-tender, non-distended  Extremities : No LE Edema  Neuro : A&Ox3    LABS:                        10.7   8.09  )-----------( 187      ( 03 Sep 2023 07:58 )             32.0     09-03    134<L>  |  98  |  41<H>  ----------------------------<  80  4.7   |  24  |  1.45<H>    Ca    9.9      03 Sep 2023 08:01  Phos  2.3     09-03  Mg     2.10     09-03    TPro  6.3  /  Alb  3.4  /  TBili  4.6<H>  /  DBili  x   /  AST  463<H>  /  ALT  126<H>  /  AlkPhos  645<H>  09-03    Urinalysis Basic - ( 03 Sep 2023 08:01 )    Color: x / Appearance: x / SG: x / pH: x  Gluc: 80 mg/dL / Ketone: x  / Bili: x / Urobili: x   Blood: x / Protein: x / Nitrite: x   Leuk Esterase: x / RBC: x / WBC x   Sq Epi: x / Non Sq Epi: x / Bacteria: x    Medications (Standing)  MEDICATIONS  (STANDING):  allopurinol 100 milliGRAM(s) Oral daily  heparin   Injectable 5000 Unit(s) SubCutaneous every 12 hours  lactated ringers. 1000 milliLiter(s) (75 mL/Hr) IV Continuous <Continuous>  mirtazapine 15 milliGRAM(s) Oral at bedtime    COORDINATION OF CARE:  Care Discussed with Consultants/Other Providers [Y/N]:  Prior or Outpatient Records Reviewed [Y/N]:

## 2023-09-04 NOTE — PROGRESS NOTE ADULT - PROBLEM SELECTOR PLAN 7
Diet: Recomendaton was minced and moist with mildly thickened liquids + Ensure, however today 9/3 pt strongly requesting regular diet due to taste/texture preference and goal to attempt to gain weight and increase caloric intake. Risks discussed, pt expressed understanding but still prefers regular diet.    DVT ppx: SubQ heparin   Code Status: Extensive GOC discussion with pt at bedside. Says he has a lot to live for including family, grandkids and wants "everything done to keep him alive" including CPR, intubation, and pressors. Palliative made aware of pt but not formally consulted at this time. Likely will be more useful once primary diagnosis established and prognosis is more clear; pt's pain is intermittent well managed at this time   Dispo: d/c home with PT and potentially additional assistance to continue onc workup and management

## 2023-09-05 LAB
ALBUMIN SERPL ELPH-MCNC: 3.4 G/DL — SIGNIFICANT CHANGE UP (ref 3.3–5)
ALP SERPL-CCNC: 716 U/L — HIGH (ref 40–120)
ALT FLD-CCNC: 120 U/L — HIGH (ref 4–41)
ANION GAP SERPL CALC-SCNC: 11 MMOL/L — SIGNIFICANT CHANGE UP (ref 7–14)
APPEARANCE UR: ABNORMAL
AST SERPL-CCNC: 443 U/L — HIGH (ref 4–40)
BACTERIA # UR AUTO: NEGATIVE /HPF — SIGNIFICANT CHANGE UP
BILIRUB SERPL-MCNC: 5.5 MG/DL — HIGH (ref 0.2–1.2)
BILIRUB UR-MCNC: ABNORMAL
BUN SERPL-MCNC: 40 MG/DL — HIGH (ref 7–23)
CALCIUM SERPL-MCNC: 10.1 MG/DL — SIGNIFICANT CHANGE UP (ref 8.4–10.5)
CHLORIDE SERPL-SCNC: 99 MMOL/L — SIGNIFICANT CHANGE UP (ref 98–107)
CO2 SERPL-SCNC: 25 MMOL/L — SIGNIFICANT CHANGE UP (ref 22–31)
COLOR SPEC: ABNORMAL
CREAT ?TM UR-MCNC: 226 MG/DL — SIGNIFICANT CHANGE UP
CREAT SERPL-MCNC: 1.53 MG/DL — HIGH (ref 0.5–1.3)
DIFF PNL FLD: NEGATIVE — SIGNIFICANT CHANGE UP
EGFR: 45 ML/MIN/1.73M2 — LOW
GLUCOSE SERPL-MCNC: 78 MG/DL — SIGNIFICANT CHANGE UP (ref 70–99)
GLUCOSE UR QL: NEGATIVE MG/DL — SIGNIFICANT CHANGE UP
HYALINE CASTS # UR AUTO: PRESENT
KETONES UR-MCNC: ABNORMAL MG/DL
LEUKOCYTE ESTERASE UR-ACNC: ABNORMAL
MAGNESIUM SERPL-MCNC: 2.1 MG/DL — SIGNIFICANT CHANGE UP (ref 1.6–2.6)
NITRITE UR-MCNC: POSITIVE
OSMOLALITY UR: 641 MOSM/KG — SIGNIFICANT CHANGE UP (ref 50–1200)
PH UR: 5 — SIGNIFICANT CHANGE UP (ref 5–8)
PHOSPHATE SERPL-MCNC: 2.6 MG/DL — SIGNIFICANT CHANGE UP (ref 2.5–4.5)
POTASSIUM SERPL-MCNC: 5.3 MMOL/L — SIGNIFICANT CHANGE UP (ref 3.5–5.3)
POTASSIUM SERPL-SCNC: 5.3 MMOL/L — SIGNIFICANT CHANGE UP (ref 3.5–5.3)
POTASSIUM UR-SCNC: 66.8 MMOL/L — SIGNIFICANT CHANGE UP
PROT ?TM UR-MCNC: 78 MG/DL — SIGNIFICANT CHANGE UP
PROT SERPL-MCNC: 6.4 G/DL — SIGNIFICANT CHANGE UP (ref 6–8.3)
PROT UR-MCNC: 100 MG/DL
PROT/CREAT UR-RTO: 0.4 RATIO — HIGH (ref 0–0.2)
RBC CASTS # UR COMP ASSIST: SIGNIFICANT CHANGE UP /HPF (ref 0–4)
REVIEW: SIGNIFICANT CHANGE UP
SODIUM SERPL-SCNC: 135 MMOL/L — SIGNIFICANT CHANGE UP (ref 135–145)
SODIUM UR-SCNC: 21 MMOL/L — SIGNIFICANT CHANGE UP
SP GR SPEC: 1.02 — SIGNIFICANT CHANGE UP (ref 1–1.03)
SQUAMOUS # UR AUTO: 3 /HPF — SIGNIFICANT CHANGE UP (ref 0–5)
UROBILINOGEN FLD QL: 2 MG/DL (ref 0.2–1)
UUN UR-MCNC: 1091.2 MG/DL — SIGNIFICANT CHANGE UP
WBC UR QL: 2 /HPF — SIGNIFICANT CHANGE UP (ref 0–5)

## 2023-09-05 PROCEDURE — 99232 SBSQ HOSP IP/OBS MODERATE 35: CPT | Mod: GC

## 2023-09-05 RX ORDER — SENNA PLUS 8.6 MG/1
1 TABLET ORAL ONCE
Refills: 0 | Status: COMPLETED | OUTPATIENT
Start: 2023-09-05 | End: 2023-09-05

## 2023-09-05 RX ORDER — SODIUM CHLORIDE 9 MG/ML
1000 INJECTION, SOLUTION INTRAVENOUS
Refills: 0 | Status: DISCONTINUED | OUTPATIENT
Start: 2023-09-05 | End: 2023-09-06

## 2023-09-05 RX ORDER — SODIUM ZIRCONIUM CYCLOSILICATE 10 G/10G
10 POWDER, FOR SUSPENSION ORAL EVERY 8 HOURS
Refills: 0 | Status: COMPLETED | OUTPATIENT
Start: 2023-09-05 | End: 2023-09-06

## 2023-09-05 RX ADMIN — SENNA PLUS 2 TABLET(S): 8.6 TABLET ORAL at 00:27

## 2023-09-05 RX ADMIN — Medication 100 MILLIGRAM(S): at 12:24

## 2023-09-05 RX ADMIN — HEPARIN SODIUM 5000 UNIT(S): 5000 INJECTION INTRAVENOUS; SUBCUTANEOUS at 06:23

## 2023-09-05 RX ADMIN — Medication 650 MILLIGRAM(S): at 18:48

## 2023-09-05 RX ADMIN — MIRTAZAPINE 15 MILLIGRAM(S): 45 TABLET, ORALLY DISINTEGRATING ORAL at 21:40

## 2023-09-05 RX ADMIN — HYDROMORPHONE HYDROCHLORIDE 2 MILLIGRAM(S): 2 INJECTION INTRAMUSCULAR; INTRAVENOUS; SUBCUTANEOUS at 10:50

## 2023-09-05 RX ADMIN — SENNA PLUS 1 TABLET(S): 8.6 TABLET ORAL at 10:19

## 2023-09-05 RX ADMIN — HEPARIN SODIUM 5000 UNIT(S): 5000 INJECTION INTRAVENOUS; SUBCUTANEOUS at 17:53

## 2023-09-05 RX ADMIN — HYDROMORPHONE HYDROCHLORIDE 2 MILLIGRAM(S): 2 INJECTION INTRAMUSCULAR; INTRAVENOUS; SUBCUTANEOUS at 10:20

## 2023-09-05 RX ADMIN — SODIUM ZIRCONIUM CYCLOSILICATE 10 GRAM(S): 10 POWDER, FOR SUSPENSION ORAL at 17:53

## 2023-09-05 RX ADMIN — SODIUM CHLORIDE 75 MILLILITER(S): 9 INJECTION, SOLUTION INTRAVENOUS at 10:19

## 2023-09-05 RX ADMIN — SODIUM CHLORIDE 75 MILLILITER(S): 9 INJECTION, SOLUTION INTRAVENOUS at 23:13

## 2023-09-05 RX ADMIN — Medication 650 MILLIGRAM(S): at 18:18

## 2023-09-05 RX ADMIN — SODIUM ZIRCONIUM CYCLOSILICATE 10 GRAM(S): 10 POWDER, FOR SUSPENSION ORAL at 08:21

## 2023-09-05 NOTE — PROGRESS NOTE ADULT - ATTENDING COMMENTS
Patient seen and examined with the GI fellow. I agree with the above assessment and plan. Thank you for allowing us to care for your patient.    Plan for ERCP to relive biliary obstruction tomorrow.

## 2023-09-05 NOTE — PROGRESS NOTE ADULT - PROBLEM SELECTOR PLAN 2
- Known liver mets (vs primary tumor?) s/p biopsy with IR outpatient 8/29. Will f/u results - onc will email to expedite results   - CTAP on admission w/ hepatomegaly with presumed innumerable hepatic metastatic lesions, trace ascites, mild anasarca  - TBili continuing to increase, 4.6 on 9/3 up from 1.0 at beginning of August.   - Oncology consulted: Elevated CEA (164), d-dimer (5418), LDH (621). Normal PSA, CA9-19, AFP tumor marker, Hepatitis non-reactive.  - Concern for possible biliary obstruction 2/2 rapidly elevated TBili and AlkPhos compared to earlier this month; MRCP w/ slight compression of CBD by liver. Pt w/o Sx of hyperbilirubinemia   - Onc w/o additional recs at this time  - Pt has initial consultation with Dr. Milagros Sol at [facility] Thurs. 9/7 at 11:30Am - Known liver mets (vs primary tumor?) s/p biopsy with IR outpatient 8/29. Will f/u results - onc will email to expedite results   - CTAP on admission w/ hepatomegaly with presumed innumerable hepatic metastatic lesions, trace ascites, mild anasarca  - TBili continuing to increase at an increasing rate, 5.5 on 9/5 up from 1.0 at beginning of August.   - Oncology consulted: Elevated CEA (164), d-dimer (5418), LDH (621). Normal PSA, CA9-19, AFP tumor marker, Hepatitis non-reactive.  - Concern for possible biliary obstruction 2/2 rapidly elevated TBili and AlkPhos compared to earlier this month; MRCP w/ slight compression of CBD by liver. Pt w/o Sx of hyperbilirubinemia   - Onc w/o additional recs at this time  - Pt has initial consultation with Dr. Milagros Sol at [facility] Thurs. 9/7 at 11:30Am

## 2023-09-05 NOTE — PROVIDER CONTACT NOTE (OTHER) - ACTION/TREATMENT ORDERED:
MD notified and made aware. MD assessed patient at bedside. No new interventions ordered at this time.

## 2023-09-05 NOTE — PROGRESS NOTE ADULT - PROBLEM SELECTOR PLAN 1
- ISO newly found lesions (presumably cancer) to liver and pelvic bones, likely lungs   - Severe generalized weakness, pt was scared to go home following IR biopsy 8/29, sent straight to [facility]. Minimal appetite, fatigue, unintentional weight loss. Denies dysphagia or odynophagia but difficulty initiating swallow and feels like food gets "stuck at the top of stomach", leading him to feel full easily and eat minimally. Says that intermittent abd pain is UNRELATED to food; avoiding food more so because of his early satiety.  - Speech + swallow eval with cineesophagram - recommending minced and moist with mildly thick liquids 2/2 silent aspiration. Pt strongly requesting regular diet despite this rec due to poor taste/dislike for M+M diet, expresses understanding of risks but emphasizes priority is increasing caloric intake.  - Considered barium esophagram + small bowel series however was deemed poor option 2/2 aspiration and pt's inability to drink large volumes of liquid at once  - Discussion w/ family yesterday that NGT carries risk of aspiration, would be poor choice for pt at this time and he cannot go home with it; d/c is goal so onc workup can be continued.   - PT eval --> rec home PT - ISO newly found lesions (presumably cancer) to liver and pelvic bones, likely lungs   - Severe generalized weakness, pt was scared to go home following IR biopsy 8/29, sent straight to [facility]. Minimal appetite, fatigue, unintentional weight loss. Denies dysphagia or odynophagia but difficulty initiating swallow and feels like food gets "stuck at the top of stomach", leading him to feel full easily and eat minimally. Says that intermittent abd pain is UNRELATED to food; avoiding food more so because of his early satiety.  - Speech + swallow eval with cineesophagram - recommending minced and moist with mildly thick liquids 2/2 silent aspiration. Pt strongly requesting regular diet despite this rec due to poor taste/dislike for M+M diet, expresses understanding of risks but emphasizes priority is increasing caloric intake.  - Considered barium esophagram + small bowel series however was deemed poor option 2/2 aspiration and pt's inability to drink large volumes of liquid at once  - Discussion w/ family  NGT carries risk of aspiration, would be poor choice for pt at this time and he cannot go home with it; d/c is goal so onc workup can be continued.   - PT eval --> rec home PT

## 2023-09-05 NOTE — PROGRESS NOTE ADULT - ATTENDING COMMENTS
Patient seen and examined. Case discussed with the medical team on rounds. I agree with the findings and the plan above.    84 yo gentlemen w/ PMHx HTN, BPH, recent findings of metastatic cancer to lung, liver, and pelvic bone w/ unclear primary source at this point, present with severe generalized weakness c/b electrolyte derangements including hyponatremia and hyperkalemia along w/ GREG, most likely due to pre-renal cause secondary to poor po intake.    Family agrees to hold off NGT as discussed on 9/2 extensively. Patient attempting to eat more to regain strength.   Worsening #hyperbillrubinemia. GI F/U, may require ERCP  Continue the rest of the work up and management as stated above.

## 2023-09-05 NOTE — PROGRESS NOTE ADULT - ASSESSMENT
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 bladder calculus s/p cystoscopic radha lithotripsy on 8/31/20;   and new findings of liver lesions on images who presents w/ hyperkalemia and GREG seen on outpatient lab work completed during scheduled IR liver biopsy earlier 8/29/23. Gi consulted for elevated bilirubin.    #Hepatic lesions s/p IR bx on 8/29, awaiting results  #Elevated bilirubin: likely 2/2 diffuse hepatic lesions vs biliary obstruction  - last MR 8/15 w/ hepatosplenomegaly with diffuse metastatic nodules throughout both lobes measuring up to 2.8 x 2.6 cm in the right inferior lobe, normal caliber ducts  MRCP: Hepatomegaly with diffuse metastatic disease, similar to prior MRI. Mild intrahepatic biliary ductal dilatation. Narrowing of the proximal common bile duct due to extrinsic compression from the enlarged liver.    #Low po intake: Denies dysphagia or odynophagia, though endorses difficulty initiating swallow. Symptoms more suggestive of oropharyngeal dysphagia. May also have component of food avoidance due to pain. No nausea, vomiting.    Recommendations  - trend CMP  - per oncology, patient's rising bilirubin may affect his treatment options. Will plan for possible ERCP w/ stent placement tomorrow  - keep npo after MN    All recommendations are tentative until note is attested by attending.     Jillian Barajas, PGY5  Gastroenterology/Hepatology Fellow  Available on Microsoft Teams  [phone] ([facility] Short Range Pager)  [phone] (Long Range Pager)    After 5pm, please contact the on-call GI fellow. [phone]

## 2023-09-05 NOTE — PROGRESS NOTE ADULT - SUBJECTIVE AND OBJECTIVE BOX
***************************************************************  Marco A Melo  (PGY1) Internal Medicine  On TEAMS  ***************************************************************    PROGRESS NOTE:     Patient is a 83y old  Male who presents with a chief complaint of Hyperkalemia, GREG (04 Sep 2023 06:28)      SUBJECTIVE / OVERNIGHT EVENTS:      MEDICATIONS  (STANDING):  allopurinol 100 milliGRAM(s) Oral daily  heparin   Injectable 5000 Unit(s) SubCutaneous every 12 hours  lactated ringers. 1000 milliLiter(s) (75 mL/Hr) IV Continuous <Continuous>  mirtazapine 15 milliGRAM(s) Oral at bedtime    MEDICATIONS  (PRN):  acetaminophen     Tablet .. 650 milliGRAM(s) Oral every 6 hours PRN Moderate Pain (4 - 6)  HYDROmorphone   Tablet 2 milliGRAM(s) Oral every 6 hours PRN Severe Pain (7 - 10)  polyethylene glycol 3350 17 Gram(s) Oral daily PRN Constipation  senna 2 Tablet(s) Oral at bedtime PRN Constipation      CAPILLARY BLOOD GLUCOSE        I&O's Summary    03 Sep 2023 07:01  -  04 Sep 2023 07:00  --------------------------------------------------------  IN: 475 mL / OUT: 450 mL / NET: 25 mL        PHYSICAL EXAM:  Vital Signs Last 24 Hrs  T(C): 36.5 (04 Sep 2023 21:40), Max: 36.7 (04 Sep 2023 13:54)  T(F): 97.7 (04 Sep 2023 21:40), Max: 98.1 (04 Sep 2023 13:54)  HR: 70 (04 Sep 2023 21:40) (65 - 77)  BP: 120/83 (04 Sep 2023 21:40) (104/71 - 128/78)  BP(mean): 98 (04 Sep 2023 06:40) (98 - 98)  RR: 17 (04 Sep 2023 21:40) (17 - 18)  SpO2: 100% (04 Sep 2023 21:40) (99% - 100%)    Parameters below as of 04 Sep 2023 21:40  Patient On (Oxygen Delivery Method): room air        General : NAD  CV: RRR no R/M/G  Lungs: CTAB  Abd : Soft, non-tender, non-distended  Extremities : No LE Edema  Neuro : A&Ox3    LABS:                        11.2   8.31  )-----------( 181      ( 04 Sep 2023 06:02 )             34.3     09-04    135  |  99  |  39<H>  ----------------------------<  86  5.5<H>   |  25  |  1.54<H>    Ca    10.1      04 Sep 2023 06:02  Phos  2.5     09-04  Mg     2.00     09-04    TPro  6.5  /  Alb  3.5  /  TBili  5.1<H>  /  DBili  x   /  AST  463<H>  /  ALT  124<H>  /  AlkPhos  677<H>  09-04    Urinalysis Basic - ( 04 Sep 2023 06:02 )    Color: x / Appearance: x / SG: x / pH: x  Gluc: 86 mg/dL / Ketone: x  / Bili: x / Urobili: x   Blood: x / Protein: x / Nitrite: x   Leuk Esterase: x / RBC: x / WBC x   Sq Epi: x / Non Sq Epi: x / Bacteria: x          Medications (Standing)  MEDICATIONS  (STANDING):  allopurinol 100 milliGRAM(s) Oral daily  heparin   Injectable 5000 Unit(s) SubCutaneous every 12 hours  lactated ringers. 1000 milliLiter(s) (75 mL/Hr) IV Continuous <Continuous>  mirtazapine 15 milliGRAM(s) Oral at bedtime        COORDINATION OF CARE:  Care Discussed with Consultants/Other Providers [Y/N]:  Prior or Outpatient Records Reviewed [Y/N]: ***************************************************************  Marco A Melo  (PGY1) Internal Medicine  On TEAMS  ***************************************************************    PROGRESS NOTE:     Patient is a 83y old  Male who presents with a chief complaint of Hyperkalemia, GREG (04 Sep 2023 06:28)      SUBJECTIVE / OVERNIGHT EVENTS: Patient seen in the bedside this morning. Awake and sitting on the chair. Slept well but was fatigue / tired last night, didn't eat dinner. Ate breakfast and lunch yesterday (more than his normal amount). Did not endorse nausea or vomiting. No BM 3 days. this morning he also noted R neck / shoulder and arm pain without associated weakness or sensory deficiets. He attributes it to his sleeping position. Denies any pruitis.  Patient has an oncology appointment this thursday 9/7.     MEDICATIONS  (STANDING):  allopurinol 100 milliGRAM(s) Oral daily  heparin   Injectable 5000 Unit(s) SubCutaneous every 12 hours  lactated ringers. 1000 milliLiter(s) (75 mL/Hr) IV Continuous <Continuous>  mirtazapine 15 milliGRAM(s) Oral at bedtime    MEDICATIONS  (PRN):  acetaminophen     Tablet .. 650 milliGRAM(s) Oral every 6 hours PRN Moderate Pain (4 - 6)  HYDROmorphone   Tablet 2 milliGRAM(s) Oral every 6 hours PRN Severe Pain (7 - 10)  polyethylene glycol 3350 17 Gram(s) Oral daily PRN Constipation  senna 2 Tablet(s) Oral at bedtime PRN Constipation      CAPILLARY BLOOD GLUCOSE  I&O's Summary    03 Sep 2023 07:01  -  04 Sep 2023 07:00  --------------------------------------------------------  IN: 475 mL / OUT: 450 mL / NET: 25 mL      PHYSICAL EXAM:  Vital Signs Last 24 Hrs  T(C): 36.5 (04 Sep 2023 21:40), Max: 36.7 (04 Sep 2023 13:54)  T(F): 97.7 (04 Sep 2023 21:40), Max: 98.1 (04 Sep 2023 13:54)  HR: 70 (04 Sep 2023 21:40) (65 - 77)  BP: 120/83 (04 Sep 2023 21:40) (104/71 - 128/78)  BP(mean): 98 (04 Sep 2023 06:40) (98 - 98)  RR: 17 (04 Sep 2023 21:40) (17 - 18)  SpO2: 100% (04 Sep 2023 21:40) (99% - 100%)    Parameters below as of 04 Sep 2023 21:40  Patient On (Oxygen Delivery Method): room air    General : NAD, on chair, cachetic  CV: RRR no R/M/G  Lungs: CTAB  Abd : Soft, non-tender, non-distended  Extremities : No LE Edema, R arm without pain to palpation, no neck stiffness of midline cervical spine tenderness. 4-5/5 strength UE b/l. Sensory to light touch intact UE.   Neuro : A&Ox3    LABS:                        11.2   8.31  )-----------( 181      ( 04 Sep 2023 06:02 )             34.3     09-04    135  |  99  |  39<H>  ----------------------------<  86  5.5<H>   |  25  |  1.54<H>    Ca    10.1      04 Sep 2023 06:02  Phos  2.5     09-04  Mg     2.00     09-04    TPro  6.5  /  Alb  3.5  /  TBili  5.1<H>  /  DBili  x   /  AST  463<H>  /  ALT  124<H>  /  AlkPhos  677<H>  09-04    Urinalysis Basic - ( 04 Sep 2023 06:02 )    Color: x / Appearance: x / SG: x / pH: x  Gluc: 86 mg/dL / Ketone: x  / Bili: x / Urobili: x   Blood: x / Protein: x / Nitrite: x   Leuk Esterase: x / RBC: x / WBC x   Sq Epi: x / Non Sq Epi: x / Bacteria: x      Medications (Standing)  MEDICATIONS  (STANDING):  allopurinol 100 milliGRAM(s) Oral daily  heparin   Injectable 5000 Unit(s) SubCutaneous every 12 hours  lactated ringers. 1000 milliLiter(s) (75 mL/Hr) IV Continuous <Continuous>  mirtazapine 15 milliGRAM(s) Oral at bedtime    COORDINATION OF CARE:  Care Discussed with Consultants/Other Providers [Y/N]:  Prior or Outpatient Records Reviewed [Y/N]:

## 2023-09-05 NOTE — PROGRESS NOTE ADULT - SUBJECTIVE AND OBJECTIVE BOX
Gastroenterology/Hepatology Progress Note    Interval Events: No events overnight. Patient continues to endorse low po intake, difficulty initiating a swallow.    Allergies:  Nuts (Angioedema)  No Known Drug Allergies  Iodine (Angioedema)  shellfish (Angioedema)    Hospital Medications:  acetaminophen     Tablet .. 650 milliGRAM(s) Oral every 6 hours PRN  allopurinol 100 milliGRAM(s) Oral daily  heparin   Injectable 5000 Unit(s) SubCutaneous every 12 hours  HYDROmorphone   Tablet 2 milliGRAM(s) Oral every 6 hours PRN  lactated ringers. 1000 milliLiter(s) IV Continuous <Continuous>  lactated ringers. 1000 milliLiter(s) IV Continuous <Continuous>  mirtazapine 15 milliGRAM(s) Oral at bedtime  polyethylene glycol 3350 17 Gram(s) Oral daily PRN  senna 2 Tablet(s) Oral at bedtime PRN  sodium zirconium cyclosilicate 10 Gram(s) Oral every 8 hours    ROS: 14 point ROS negative unless otherwise state in subjective    PHYSICAL EXAM:   Vital Signs:  Vital Signs Last 24 Hrs  T(C): 36.4 (05 Sep 2023 12:08), Max: 36.5 (04 Sep 2023 21:40)  T(F): 97.6 (05 Sep 2023 12:08), Max: 97.7 (04 Sep 2023 21:40)  HR: 78 (05 Sep 2023 12:08) (70 - 78)  BP: 110/70 (05 Sep 2023 12:08) (110/70 - 121/77)  BP(mean): --  RR: 18 (05 Sep 2023 12:08) (17 - 18)  SpO2: 98% (05 Sep 2023 12:08) (98% - 100%)    Parameters below as of 05 Sep 2023 12:08  Patient On (Oxygen Delivery Method): room air    Daily     Daily     GENERAL:  No acute distress  HEENT:  NCAT, + scleral icterus  CHEST: no resp distress  HEART:  RRR  ABDOMEN:  Soft, non-tender, non-distended   EXTREMITIES:  No cyanosis, clubbing, or edema. +jaundice  SKIN:  No rash/erythema/ecchymoses   NEURO:  Alert and oriented x 3     LABS:                        11.2   8.31  )-----------( 181      ( 04 Sep 2023 06:02 )             34.3       09-05    135  |  99  |  40<H>  ----------------------------<  78  5.3   |  25  |  1.53<H>    Ca    10.1      05 Sep 2023 07:11  Phos  2.6       Mg     2.10         TPro  6.4  /  Alb  3.4  /  TBili  5.5<H>  /  DBili  x   /  AST  443<H>  /  ALT  120<H>  /  AlkPhos  716<H>      LIVER FUNCTIONS - ( 05 Sep 2023 07:11 )  Alb: 3.4 g/dL / Pro: 6.4 g/dL / ALK PHOS: 716 U/L / ALT: 120 U/L / AST: 443 U/L / GGT: x             Urinalysis Basic - ( 05 Sep 2023 12:33 )    Color: Orange / Appearance: Cloudy / S.024 / pH: x  Gluc: x / Ketone: Trace mg/dL  / Bili: Moderate / Urobili: 2.0 mg/dL   Blood: x / Protein: 100 mg/dL / Nitrite: Positive   Leuk Esterase: Small / RBC: 2-5 /HPF / WBC 2 /HPF   Sq Epi: x / Non Sq Epi: 3 /HPF / Bacteria: Negative /HPF      Imaging:  ACC: [id number] EXAM:  MR MRCP   ORDERED BY: MINI AUGUSTINE     PROCEDURE DATE:  2023      INTERPRETATION:  CLINICAL INFORMATION: Metastatic cancer with liver   lesions.    COMPARISON: CT abdomen pelvis 2023. MR abdomen 8/15/2023.    CONTRAST/COMPLICATIONS:  IV Contrast: Gadavist  5.0 cc administered   2.5 cc discarded  Oral Contrast: NONE  Complications: None reported at time of study completion    PROCEDURE:  MRI of the abdomen was performed.  MRCP was performed.    FINDINGS:  LOWER CHEST: Within normal limits.    LIVER: Hepatomegaly . Redemonstrated innumerable bilobar hepatic   metastases, similar to the prior MRI 8/15/2023  BILE DUCTS: Mild intrahepatic biliary ductal dilatation. Narrowing of the   proximal common bile duct dueto extrinsic compression from the enlarged   liver.  GALLBLADDER: Within normal limits.  SPLEEN: Within normal limits.  PANCREAS: Within normal limits.  ADRENALS: Within normal limits.  KIDNEYS/URETERS: Left renal parapelvic cysts.    VISUALIZED PORTIONS:  BOWEL: Within normal limits.  PERITONEUM: Trace ascites.  VESSELS: Within normal limits.  RETROPERITONEUM/LYMPH NODES: No lymphadenopathy.  ABDOMINAL WALL: Within normal limits.  BONES: Multiple enhancing osseous lesions in the pelvis, similar to the   prior MRI.    IMPRESSION:  Hepatomegaly with diffuse metastatic disease, similar to prior MRI. Mild   intrahepatic biliary ductal dilatation. Narrowing of the proximal common   bile duct due to extrinsic compression from the enlarged liver.    Osseous metastatic disease.    --- End of Report ---      ДМИТРИЙ HARDY MD; Attending Radiologist  This document has been electronically signed. Sep  1 2023  4:59PM

## 2023-09-05 NOTE — PROGRESS NOTE ADULT - PROBLEM SELECTOR PLAN 3
- BUN/Cr 46/1.67 on admission likely pre-renal ISO poor po intake. Cr continuing to improve slightly, 1.45 9/3  - Continue LR 75 cc/hr  - Avoid nephrotoxins, renally dose meds - BUN/Cr 46/1.67 on admission likely pre-renal ISO poor po intake. Cr continuing to improve slightly, 40/1.53 9/3  - Continue LR 75 cc/hr  - Avoid nephrotoxins, renally dose meds

## 2023-09-05 NOTE — PROGRESS NOTE ADULT - ASSESSMENT
Pt is an 83 y.o. M w/ PMHx HTN, BPH, recent findings of metastatic cancer to lung, liver, and pelvic bone w/ unclear primary source at this point, present with severe generalized weakness ISO electrolyte derangements including hyponatremia and hyperkalemia along w/ GREG, most likely due to pre-renal cause secondary to poor po intake.    AS of 9/4 patient continues to have fatigue, poor appetite without n/v. Onc inpatient w/o any further workup. Outpatient appointment with oncology. Uptrending Tbili, although asymptomatic.   Says it would be impossible for him to function at home as he can barely walk on his own from his bed to bathroom.  Pt is an 83 y.o. M w/ PMHx HTN, BPH, recent findings of metastatic cancer to lung, liver, and pelvic bone w/ unclear primary source at this point, present with severe generalized weakness ISO electrolyte derangements including hyponatremia and hyperkalemia along w/ GREG, most likely due to pre-renal cause secondary to poor po intake.    Patient continues to have fatigue, poor appetite without n/v. Onc inpatient w/o any further workup. Outpatient appointment with oncology. Uptrending Tbili, although asymptomatic. [facility] will evaluate today.;   Says it would be impossible for him to function at home as he can barely walk on his own from his bed to bathroom.

## 2023-09-06 LAB
ALBUMIN SERPL ELPH-MCNC: 3.2 G/DL — LOW (ref 3.3–5)
ALP SERPL-CCNC: 718 U/L — HIGH (ref 40–120)
ALT FLD-CCNC: 119 U/L — HIGH (ref 4–41)
ANION GAP SERPL CALC-SCNC: 14 MMOL/L — SIGNIFICANT CHANGE UP (ref 7–14)
APTT BLD: 37.9 SEC — HIGH (ref 24.5–35.6)
AST SERPL-CCNC: 395 U/L — HIGH (ref 4–40)
BASOPHILS # BLD AUTO: 0.03 K/UL — SIGNIFICANT CHANGE UP (ref 0–0.2)
BASOPHILS NFR BLD AUTO: 0.3 % — SIGNIFICANT CHANGE UP (ref 0–2)
BILIRUB SERPL-MCNC: 6.6 MG/DL — HIGH (ref 0.2–1.2)
BLD GP AB SCN SERPL QL: NEGATIVE — SIGNIFICANT CHANGE UP
BUN SERPL-MCNC: 45 MG/DL — HIGH (ref 7–23)
CALCIUM SERPL-MCNC: 9.7 MG/DL — SIGNIFICANT CHANGE UP (ref 8.4–10.5)
CHLORIDE SERPL-SCNC: 94 MMOL/L — LOW (ref 98–107)
CO2 SERPL-SCNC: 24 MMOL/L — SIGNIFICANT CHANGE UP (ref 22–31)
CREAT SERPL-MCNC: 1.57 MG/DL — HIGH (ref 0.5–1.3)
EGFR: 44 ML/MIN/1.73M2 — LOW
EOSINOPHIL # BLD AUTO: 0.06 K/UL — SIGNIFICANT CHANGE UP (ref 0–0.5)
EOSINOPHIL NFR BLD AUTO: 0.7 % — SIGNIFICANT CHANGE UP (ref 0–6)
GLUCOSE SERPL-MCNC: 87 MG/DL — SIGNIFICANT CHANGE UP (ref 70–99)
HCT VFR BLD CALC: 31.4 % — LOW (ref 39–50)
HGB BLD-MCNC: 10.7 G/DL — LOW (ref 13–17)
IANC: 6.92 K/UL — SIGNIFICANT CHANGE UP (ref 1.8–7.4)
IMM GRANULOCYTES NFR BLD AUTO: 0.7 % — SIGNIFICANT CHANGE UP (ref 0–0.9)
INR BLD: 1.18 RATIO — SIGNIFICANT CHANGE UP (ref 0.85–1.18)
LYMPHOCYTES # BLD AUTO: 0.54 K/UL — LOW (ref 1–3.3)
LYMPHOCYTES # BLD AUTO: 6.3 % — LOW (ref 13–44)
MAGNESIUM SERPL-MCNC: 2.2 MG/DL — SIGNIFICANT CHANGE UP (ref 1.6–2.6)
MCHC RBC-ENTMCNC: 31.8 PG — SIGNIFICANT CHANGE UP (ref 27–34)
MCHC RBC-ENTMCNC: 34.1 GM/DL — SIGNIFICANT CHANGE UP (ref 32–36)
MCV RBC AUTO: 93.5 FL — SIGNIFICANT CHANGE UP (ref 80–100)
MONOCYTES # BLD AUTO: 1.02 K/UL — HIGH (ref 0–0.9)
MONOCYTES NFR BLD AUTO: 11.8 % — SIGNIFICANT CHANGE UP (ref 2–14)
NEUTROPHILS # BLD AUTO: 6.92 K/UL — SIGNIFICANT CHANGE UP (ref 1.8–7.4)
NEUTROPHILS NFR BLD AUTO: 80.2 % — HIGH (ref 43–77)
NON-GYNECOLOGICAL CYTOLOGY STUDY: SIGNIFICANT CHANGE UP
NRBC # BLD: 0 /100 WBCS — SIGNIFICANT CHANGE UP (ref 0–0)
NRBC # FLD: 0 K/UL — SIGNIFICANT CHANGE UP (ref 0–0)
PHOSPHATE SERPL-MCNC: 3 MG/DL — SIGNIFICANT CHANGE UP (ref 2.5–4.5)
PLATELET # BLD AUTO: 173 K/UL — SIGNIFICANT CHANGE UP (ref 150–400)
POTASSIUM SERPL-MCNC: 4.6 MMOL/L — SIGNIFICANT CHANGE UP (ref 3.5–5.3)
POTASSIUM SERPL-SCNC: 4.6 MMOL/L — SIGNIFICANT CHANGE UP (ref 3.5–5.3)
PROT SERPL-MCNC: 6.2 G/DL — SIGNIFICANT CHANGE UP (ref 6–8.3)
PROTHROM AB SERPL-ACNC: 13.3 SEC — HIGH (ref 9.5–13)
RBC # BLD: 3.36 M/UL — LOW (ref 4.2–5.8)
RBC # FLD: 14.7 % — HIGH (ref 10.3–14.5)
RH IG SCN BLD-IMP: POSITIVE — SIGNIFICANT CHANGE UP
SODIUM SERPL-SCNC: 132 MMOL/L — LOW (ref 135–145)
URATE SERPL-MCNC: 9.1 MG/DL — HIGH (ref 3.4–8.8)
WBC # BLD: 8.63 K/UL — SIGNIFICANT CHANGE UP (ref 3.8–10.5)
WBC # FLD AUTO: 8.63 K/UL — SIGNIFICANT CHANGE UP (ref 3.8–10.5)

## 2023-09-06 PROCEDURE — 43264 ERCP REMOVE DUCT CALCULI: CPT

## 2023-09-06 PROCEDURE — 74360 X-RAY GUIDE GI DILATION: CPT | Mod: 26

## 2023-09-06 PROCEDURE — 43239 EGD BIOPSY SINGLE/MULTIPLE: CPT | Mod: 59

## 2023-09-06 PROCEDURE — 88305 TISSUE EXAM BY PATHOLOGIST: CPT | Mod: 26,59

## 2023-09-06 PROCEDURE — 99232 SBSQ HOSP IP/OBS MODERATE 35: CPT | Mod: GC

## 2023-09-06 PROCEDURE — 43248 EGD GUIDE WIRE INSERTION: CPT

## 2023-09-06 PROCEDURE — 43274 ERCP DUCT STENT PLACEMENT: CPT

## 2023-09-06 DEVICE — STENT BIL WALL RX FC RMV US 10X60MM: Type: IMPLANTABLE DEVICE | Status: FUNCTIONAL

## 2023-09-06 DEVICE — GWIRE SAVARY 200CM: Type: IMPLANTABLE DEVICE | Status: FUNCTIONAL

## 2023-09-06 DEVICE — CATH BLLN EXTRACT DIST GUIDE WIRE 15MM 3LUM: Type: IMPLANTABLE DEVICE | Status: FUNCTIONAL

## 2023-09-06 DEVICE — GWIRE JAGTOME REVOLUTION RX 260CM/0.025IN: Type: IMPLANTABLE DEVICE | Status: FUNCTIONAL

## 2023-09-06 RX ORDER — FLUCONAZOLE 150 MG/1
400 TABLET ORAL ONCE
Refills: 0 | Status: COMPLETED | OUTPATIENT
Start: 2023-09-06 | End: 2023-09-06

## 2023-09-06 RX ORDER — FLUCONAZOLE 150 MG/1
200 TABLET ORAL DAILY
Refills: 0 | Status: DISCONTINUED | OUTPATIENT
Start: 2023-09-06 | End: 2023-09-06

## 2023-09-06 RX ORDER — SODIUM CHLORIDE 9 MG/ML
1000 INJECTION, SOLUTION INTRAVENOUS
Refills: 0 | Status: DISCONTINUED | OUTPATIENT
Start: 2023-09-06 | End: 2023-09-07

## 2023-09-06 RX ORDER — FLUCONAZOLE 150 MG/1
100 TABLET ORAL EVERY 24 HOURS
Refills: 0 | Status: DISCONTINUED | OUTPATIENT
Start: 2023-09-07 | End: 2023-09-14

## 2023-09-06 RX ADMIN — MIRTAZAPINE 15 MILLIGRAM(S): 45 TABLET, ORALLY DISINTEGRATING ORAL at 22:09

## 2023-09-06 RX ADMIN — FLUCONAZOLE 400 MILLIGRAM(S): 150 TABLET ORAL at 22:08

## 2023-09-06 RX ADMIN — SODIUM CHLORIDE 50 MILLILITER(S): 9 INJECTION, SOLUTION INTRAVENOUS at 23:33

## 2023-09-06 RX ADMIN — SODIUM CHLORIDE 50 MILLILITER(S): 9 INJECTION, SOLUTION INTRAVENOUS at 18:10

## 2023-09-06 RX ADMIN — HEPARIN SODIUM 5000 UNIT(S): 5000 INJECTION INTRAVENOUS; SUBCUTANEOUS at 18:10

## 2023-09-06 NOTE — PROGRESS NOTE ADULT - PROBLEM SELECTOR PLAN 1
- ISO newly found lesions (presumably cancer) to liver and pelvic bones, likely lungs   - Severe generalized weakness, pt was scared to go home following IR biopsy 8/29, sent straight to [facility]. Minimal appetite, fatigue, unintentional weight loss. Denies dysphagia or odynophagia but difficulty initiating swallow and feels like food gets "stuck at the top of stomach", leading him to feel full easily and eat minimally. Says that intermittent abd pain is UNRELATED to food; avoiding food more so because of his early satiety.  - Speech + swallow eval with cineesophagram - recommending minced and moist with mildly thick liquids 2/2 silent aspiration. Pt strongly requesting regular diet despite this rec due to poor taste/dislike for M+M diet, expresses understanding of risks but emphasizes priority is increasing caloric intake.  - Considered barium esophagram + small bowel series however was deemed poor option 2/2 aspiration and pt's inability to drink large volumes of liquid at once  - Discussion w/ family  NGT carries risk of aspiration, would be poor choice for pt at this time and he cannot go home with it; d/c is goal so onc workup can be continued.   - PT eval --> rec home PT

## 2023-09-06 NOTE — PROGRESS NOTE ADULT - PROBLEM SELECTOR PLAN 3
- BUN/Cr 46/1.67 on admission likely pre-renal ISO poor po intake. Cr continuing to improve slightly, 40/1.53 9/3  - Continue LR 75 cc/hr  - Avoid nephrotoxins, renally dose meds

## 2023-09-06 NOTE — PROGRESS NOTE ADULT - PROBLEM SELECTOR PLAN 2
- Known liver mets (vs primary tumor?) s/p biopsy with IR outpatient 8/29. Will f/u results - onc will email to expedite results   - CTAP on admission w/ hepatomegaly with presumed innumerable hepatic metastatic lesions, trace ascites, mild anasarca  - TBili continuing to increase at an increasing rate, 5.5 on 9/5 up from 1.0 at beginning of August.   - Oncology consulted: Elevated CEA (164), d-dimer (5418), LDH (621). Normal PSA, CA9-19, AFP tumor marker, Hepatitis non-reactive.  - Concern for possible biliary obstruction 2/2 rapidly elevated TBili and AlkPhos compared to earlier this month; MRCP w/ slight compression of CBD by liver. Pt w/o Sx of hyperbilirubinemia   - Onc w/o additional recs at this time  - Pt has initial consultation with Dr. Milagros Sol at [facility] Thurs. 9/7 at 11:30Am - Known liver mets (vs primary tumor?) s/p biopsy with IR outpatient 8/29. Will f/u results - onc will email to expedite results   - CTAP on admission w/ hepatomegaly with presumed innumerable hepatic metastatic lesions, trace ascites, mild anasarca  - TBili continuing to increase at an increasing rate, 5.5 on 9/5 up from 1.0 at beginning of August.   - Oncology consulted: Elevated CEA (164), d-dimer (5418), LDH (621). Normal PSA, CA9-19, AFP tumor marker, Hepatitis non-reactive.  - Concern for possible biliary obstruction 2/2 rapidly elevated TBili and AlkPhos compared to earlier this month; MRCP w/ slight compression of CBD by liver. Pt w/o Sx of hyperbilirubinemia.   - Plan for ERCP with possible stent placement 9/6.  - Onc w/o additional recs at this time  - Pt has initial consultation with Dr. Milagros Sol at [facility] Thurs. 9/7 at 11:30Am

## 2023-09-06 NOTE — PROGRESS NOTE ADULT - SUBJECTIVE AND OBJECTIVE BOX
***************************************************************  Marco A Melo  (PGY1) Internal Medicine  On TEAMS  ***************************************************************    PROGRESS NOTE:     Patient is a 83y old  Male who presents with a chief complaint of Hyperkalemia, GREG (05 Sep 2023 15:35)      SUBJECTIVE / OVERNIGHT EVENTS:      MEDICATIONS  (STANDING):  allopurinol 100 milliGRAM(s) Oral daily  heparin   Injectable 5000 Unit(s) SubCutaneous every 12 hours  lactated ringers. 1000 milliLiter(s) (75 mL/Hr) IV Continuous <Continuous>  lactated ringers. 1000 milliLiter(s) (75 mL/Hr) IV Continuous <Continuous>  mirtazapine 15 milliGRAM(s) Oral at bedtime    MEDICATIONS  (PRN):  acetaminophen     Tablet .. 650 milliGRAM(s) Oral every 6 hours PRN Moderate Pain (4 - 6)  HYDROmorphone   Tablet 2 milliGRAM(s) Oral every 6 hours PRN Severe Pain (7 - 10)  polyethylene glycol 3350 17 Gram(s) Oral daily PRN Constipation  senna 2 Tablet(s) Oral at bedtime PRN Constipation    PHYSICAL EXAM:  Vital Signs Last 24 Hrs  T(C): 36.3 (06 Sep 2023 05:33), Max: 36.7 (05 Sep 2023 21:13)  T(F): 97.4 (06 Sep 2023 05:33), Max: 98.1 (05 Sep 2023 21:13)  HR: 70 (06 Sep 2023 05:33) (70 - 78)  BP: 112/85 (06 Sep 2023 05:33) (102/71 - 112/85)  BP(mean): --  RR: 18 (06 Sep 2023 05:33) (17 - 18)  SpO2: 100% (06 Sep 2023 05:33) (95% - 100%)    Parameters below as of 06 Sep 2023 05:33  Patient On (Oxygen Delivery Method): room air    General : NAD, on chair, cachetic  CV: RRR no R/M/G  Lungs: CTAB  Abd : Soft, non-tender, non-distended  Extremities : No LE Edema, R arm without pain to palpation, no neck stiffness of midline cervical spine tenderness. 4-5/5 strength UE b/l. Sensory to light touch intact UE.   Neuro : A&Ox3    LABS:                        10.7   8.63  )-----------( 173      ( 06 Sep 2023 03:10 )             31.4     09-06    132<L>  |  94<L>  |  45<H>  ----------------------------<  87  4.6   |  24  |  1.57<H>    Ca    9.7      06 Sep 2023 03:10  Phos  3.0     09-06  Mg     2.20     09-06    TPro  6.2  /  Alb  3.2<L>  /  TBili  6.6<H>  /  DBili  x   /  AST  395<H>  /  ALT  119<H>  /  AlkPhos  718<H>  09-06    PT/INR - ( 06 Sep 2023 03:10 )   PT: 13.3 sec;   INR: 1.18 ratio         PTT - ( 06 Sep 2023 03:10 )  PTT:37.9 sec      Urinalysis Basic - ( 06 Sep 2023 03:10 )    Color: x / Appearance: x / SG: x / pH: x  Gluc: 87 mg/dL / Ketone: x  / Bili: x / Urobili: x   Blood: x / Protein: x / Nitrite: x   Leuk Esterase: x / RBC: x / WBC x   Sq Epi: x / Non Sq Epi: x / Bacteria: x          Medications (Standing)  MEDICATIONS  (STANDING):  allopurinol 100 milliGRAM(s) Oral daily  heparin   Injectable 5000 Unit(s) SubCutaneous every 12 hours  lactated ringers. 1000 milliLiter(s) (75 mL/Hr) IV Continuous <Continuous>  lactated ringers. 1000 milliLiter(s) (75 mL/Hr) IV Continuous <Continuous>  mirtazapine 15 milliGRAM(s) Oral at bedtime        COORDINATION OF CARE:  Care Discussed with Consultants/Other Providers [Y/N]:  Prior or Outpatient Records Reviewed [Y/N]:   ***************************************************************  Marco A Melo  (PGY1) Internal Medicine  On TEAMS  ***************************************************************    PROGRESS NOTE:     Patient is a 83y old  Male who presents with a chief complaint of Hyperkalemia, GREG (05 Sep 2023 15:35)      SUBJECTIVE / OVERNIGHT EVENTS: Patient seen at the bedside this morning. No overnight events. Poor appetite with continue difficulty iniating swallow. NPO since midnight for possible biliary stent placement. B/L lower extremity swelling with erythema or pain. No SOB or CP. Denies nausea or vomiting. R arm pain completely resolved.       MEDICATIONS  (STANDING):  allopurinol 100 milliGRAM(s) Oral daily  heparin   Injectable 5000 Unit(s) SubCutaneous every 12 hours  lactated ringers. 1000 milliLiter(s) (75 mL/Hr) IV Continuous <Continuous>  lactated ringers. 1000 milliLiter(s) (75 mL/Hr) IV Continuous <Continuous>  mirtazapine 15 milliGRAM(s) Oral at bedtime    MEDICATIONS  (PRN):  acetaminophen     Tablet .. 650 milliGRAM(s) Oral every 6 hours PRN Moderate Pain (4 - 6)  HYDROmorphone   Tablet 2 milliGRAM(s) Oral every 6 hours PRN Severe Pain (7 - 10)  polyethylene glycol 3350 17 Gram(s) Oral daily PRN Constipation  senna 2 Tablet(s) Oral at bedtime PRN Constipation    PHYSICAL EXAM:  Vital Signs Last 24 Hrs  T(C): 36.3 (06 Sep 2023 05:33), Max: 36.7 (05 Sep 2023 21:13)  T(F): 97.4 (06 Sep 2023 05:33), Max: 98.1 (05 Sep 2023 21:13)  HR: 70 (06 Sep 2023 05:33) (70 - 78)  BP: 112/85 (06 Sep 2023 05:33) (102/71 - 112/85)  BP(mean): --  RR: 18 (06 Sep 2023 05:33) (17 - 18)  SpO2: 100% (06 Sep 2023 05:33) (95% - 100%)    Parameters below as of 06 Sep 2023 05:33  Patient On (Oxygen Delivery Method): room air    General : NAD, on chair, cachetic  CV: RRR no R/M/G  Lungs: CTAB  Abd : Soft, non-tender, non-distended  Extremities : 1+ b/l LE Edema, without tenderness or erythema. , 4-5/5 strength UE b/l.  Neuro : A&Ox3    LABS:                        10.7   8.63  )-----------( 173      ( 06 Sep 2023 03:10 )             31.4     09-06    132<L>  |  94<L>  |  45<H>  ----------------------------<  87  4.6   |  24  |  1.57<H>    Ca    9.7      06 Sep 2023 03:10  Phos  3.0     09-06  Mg     2.20     09-06    TPro  6.2  /  Alb  3.2<L>  /  TBili  6.6<H>  /  DBili  x   /  AST  395<H>  /  ALT  119<H>  /  AlkPhos  718<H>  09-06    PT/INR - ( 06 Sep 2023 03:10 )   PT: 13.3 sec;   INR: 1.18 ratio         PTT - ( 06 Sep 2023 03:10 )  PTT:37.9 sec      Urinalysis Basic - ( 06 Sep 2023 03:10 )    Color: x / Appearance: x / SG: x / pH: x  Gluc: 87 mg/dL / Ketone: x  / Bili: x / Urobili: x   Blood: x / Protein: x / Nitrite: x   Leuk Esterase: x / RBC: x / WBC x   Sq Epi: x / Non Sq Epi: x / Bacteria: x          Medications (Standing)  MEDICATIONS  (STANDING):  allopurinol 100 milliGRAM(s) Oral daily  heparin   Injectable 5000 Unit(s) SubCutaneous every 12 hours  lactated ringers. 1000 milliLiter(s) (75 mL/Hr) IV Continuous <Continuous>  lactated ringers. 1000 milliLiter(s) (75 mL/Hr) IV Continuous <Continuous>  mirtazapine 15 milliGRAM(s) Oral at bedtime        COORDINATION OF CARE:  Care Discussed with Consultants/Other Providers [Y/N]:  Prior or Outpatient Records Reviewed [Y/N]:

## 2023-09-06 NOTE — PROGRESS NOTE ADULT - PROBLEM SELECTOR PLAN 7
Diet: Recomendaton was minced and moist with mildly thickened liquids + Ensure, however today 9/3 pt strongly requesting regular diet due to taste/texture preference and goal to attempt to gain weight and increase caloric intake. Risks discussed, pt expressed understanding but still prefers regular diet.    DVT ppx: SubQ heparin   Code Status: Extensive GOC discussion with pt at bedside. Says he has a lot to live for including family, grandkids and wants "everything done to keep him alive" including CPR, intubation, and pressors. Palliative made aware of pt but not formally consulted at this time. Likely will be more useful once primary diagnosis established and prognosis is more clear; pt's pain is intermittent well managed at this time   Dispo: d/c home with PT and potentially additional assistance to continue onc workup and management Diet: Recommendation was minced and moist with mildly thickened liquids + Ensure, however today 9/3 pt strongly requesting regular diet due to taste/texture preference and goal to attempt to gain weight and increase caloric intake. Risks discussed, pt expressed understanding but still prefers regular diet.    DVT ppx: SubQ heparin   Code Status: Extensive GOC discussion with pt at bedside. Says he has a lot to live for including family, grandkids and wants "everything done to keep him alive" including CPR, intubation, and pressors. Palliative made aware of pt but not formally consulted at this time. Likely will be more useful once primary diagnosis established and prognosis is more clear; pt's pain is intermittent well managed at this time   Dispo: d/c home with PT and potentially additional assistance to continue onc workup and management

## 2023-09-06 NOTE — CHART NOTE - NSCHARTNOTEFT_GEN_A_CORE
Source: Patient A&Ox4  Family [  ]     RN [X]    Chart [X]    Reason for Follow-Up Assessment: Length Of Stay (Severe Malnutrition)    83M with PMHx of HTN, BPH, arthritis, and liver lesions presenting w/ hyperkalemia and GREG seen on outpatient lab work completed during scheduled IR liver biopsy earlier today    Previous dietitian assessment 8/31, Pt seen and reports 50% intake of meals and c/o diarrhea last night but improved today. As per SLP recommendation (8/31/23)-Minced and Moist Solids and Mildly Thick Liquids. Will add supplement Hormel vital shake x3/day with thickener packets. UBW- 130# per pt 3 months ago. Current wt- 106.7# (8/29/23). Noted wt loss 23#/17.6% in 3 months. Pt with signs of muscle wasting/fat loss upon visualization.    Patient was seen at bedside. Patient is consuming <50% of meals currently, patient is on appetite stimulant. Patient does not feel like eating and eats what they can. No chewing difficulties. Denies nausea or vomiting. No constipation or diarrhea. Last bowel movement was 9/5.       Diet, NPO after Midnight:   NPO Start Date: 05-Sep-2023,   NPO Start Time: 23:59 (09-05-23 @ 16:11)  Diet, Regular:   Mildly Thick Liquids (MILDTHICKLIQS) (09-03-23 @ 09:02)      Anthropometrics: Height (cm): 182.9 (09-06), 182.9 (08-08)  Weight (kg): 48 (09-06), 59 (08-08)  BMI (kg/m2): 14.3 (09-06), 17.6 (08-08)    Edema: +3 Left/Right Foot and Ankle  Pressure Injuries: None    __________________ Pertinent Medications__________________   MEDICATIONS  (STANDING):  allopurinol 100 milliGRAM(s) Oral daily  heparin   Injectable 5000 Unit(s) SubCutaneous every 12 hours  mirtazapine 15 milliGRAM(s) Oral at bedtime    MEDICATIONS  (PRN):  acetaminophen     Tablet .. 650 milliGRAM(s) Oral every 6 hours PRN Moderate Pain (4 - 6)  HYDROmorphone   Tablet 2 milliGRAM(s) Oral every 6 hours PRN Severe Pain (7 - 10)  polyethylene glycol 3350 17 Gram(s) Oral daily PRN Constipation  senna 2 Tablet(s) Oral at bedtime PRN Constipation      __________________ Pertinent Labs__________________   09-06 Na132 mmol/L<L> Glu 87 mg/dL K+ 4.6 mmol/L Cr  1.57 mg/dL<H> BUN 45 mg/dL<H> 09-06 Phos 3.0 mg/dL 09-06 Alb 3.2 g/dL<L>      Needs Assessment:     Weight Used: IBW 81 kg  Estimated Energy Needs: 30 - 35 deepa/kg  Estimated Energy Needs: 2025 - 2835 deepa/d    Weight Used: IBW 81 kg  Estimated Protein Needs: 1.4 - 2 gm/kg   Estimated Protein Needs: 113 - 162 gm/d      Previous Nutrition Diagnosis: Severe protein calorie related to catabolic illness as evidenced by <75% nutritional needs >1 month, 18.4% wt. loss in 3 mos. severe loss of muscle mass and fat stores.      Nutrition Diagnosis is [X] ongoing  [  ] resolved [  ] not applicable       Education:  [  ] Given today        Type of education provided:   [  ] Given on previous assessment by RD   [  ] Not applicable 2/2 cognitive deficit  [  ] Pt refusal of education offered   [  ] Not applicable 2/2 current prognosis  [X] Not warranted at present      Recommendations:  1. Continue Regular diet with Mildly Thick Liquids  2. Encourage PO intake and honor food preferences as able.        Monitoring and Evaluation:   [X] Tolerance to diet prescription [X] weights [X] follow up per protocol  [  ] other:

## 2023-09-06 NOTE — PROGRESS NOTE ADULT - ATTENDING COMMENTS
84 yo gentlemen w/ PMHx HTN, BPH, recent findings of metastatic cancer to lung, liver, and pelvic bone w/ unclear primary source at this point, present with severe generalized weakness c/b electrolyte derangements including hyponatremia and hyperkalemia along w/ GREG, most likely due to pre-renal cause secondary to poor po intake.    Family agrees to hold off NGT as discussed on 9/2 extensively. Patient attempting to eat more to regain strength.   Worsening #hyperbillrubinemia. Will undergo ERCP today with GI.   Continue the rest of the work up and management as stated above.

## 2023-09-07 DIAGNOSIS — C34.90 MALIGNANT NEOPLASM OF UNSPECIFIED PART OF UNSPECIFIED BRONCHUS OR LUNG: ICD-10-CM

## 2023-09-07 LAB
ALBUMIN SERPL ELPH-MCNC: 3 G/DL — LOW (ref 3.3–5)
ALP SERPL-CCNC: 630 U/L — HIGH (ref 40–120)
ALT FLD-CCNC: 101 U/L — HIGH (ref 4–41)
ANION GAP SERPL CALC-SCNC: 17 MMOL/L — HIGH (ref 7–14)
AST SERPL-CCNC: 358 U/L — HIGH (ref 4–40)
BILIRUB SERPL-MCNC: 6.3 MG/DL — HIGH (ref 0.2–1.2)
BUN SERPL-MCNC: 54 MG/DL — HIGH (ref 7–23)
CALCIUM SERPL-MCNC: 9.7 MG/DL — SIGNIFICANT CHANGE UP (ref 8.4–10.5)
CHLORIDE SERPL-SCNC: 96 MMOL/L — LOW (ref 98–107)
CO2 SERPL-SCNC: 21 MMOL/L — LOW (ref 22–31)
CREAT ?TM UR-MCNC: 207 MG/DL — SIGNIFICANT CHANGE UP
CREAT SERPL-MCNC: 2.17 MG/DL — HIGH (ref 0.5–1.3)
EGFR: 30 ML/MIN/1.73M2 — LOW
GLUCOSE SERPL-MCNC: 80 MG/DL — SIGNIFICANT CHANGE UP (ref 70–99)
MAGNESIUM SERPL-MCNC: 2.1 MG/DL — SIGNIFICANT CHANGE UP (ref 1.6–2.6)
PHOSPHATE SERPL-MCNC: 3.8 MG/DL — SIGNIFICANT CHANGE UP (ref 2.5–4.5)
POTASSIUM SERPL-MCNC: 5.3 MMOL/L — SIGNIFICANT CHANGE UP (ref 3.5–5.3)
POTASSIUM SERPL-SCNC: 5.3 MMOL/L — SIGNIFICANT CHANGE UP (ref 3.5–5.3)
PROT SERPL-MCNC: 5.8 G/DL — LOW (ref 6–8.3)
SODIUM SERPL-SCNC: 134 MMOL/L — LOW (ref 135–145)
SODIUM UR-SCNC: 29 MMOL/L — SIGNIFICANT CHANGE UP
UUN UR-MCNC: 792 MG/DL — SIGNIFICANT CHANGE UP

## 2023-09-07 PROCEDURE — 99233 SBSQ HOSP IP/OBS HIGH 50: CPT | Mod: GC

## 2023-09-07 PROCEDURE — 99232 SBSQ HOSP IP/OBS MODERATE 35: CPT | Mod: GC

## 2023-09-07 PROCEDURE — 76705 ECHO EXAM OF ABDOMEN: CPT | Mod: 26

## 2023-09-07 PROCEDURE — 93970 EXTREMITY STUDY: CPT | Mod: 26

## 2023-09-07 RX ORDER — SODIUM ZIRCONIUM CYCLOSILICATE 10 G/10G
10 POWDER, FOR SUSPENSION ORAL EVERY 8 HOURS
Refills: 0 | Status: COMPLETED | OUTPATIENT
Start: 2023-09-07 | End: 2023-09-08

## 2023-09-07 RX ORDER — SODIUM CHLORIDE 9 MG/ML
1000 INJECTION, SOLUTION INTRAVENOUS
Refills: 0 | Status: DISCONTINUED | OUTPATIENT
Start: 2023-09-07 | End: 2023-09-08

## 2023-09-07 RX ORDER — DIPHENHYDRAMINE HYDROCHLORIDE AND LIDOCAINE HYDROCHLORIDE AND ALUMINUM HYDROXIDE AND MAGNESIUM HYDRO
10 KIT
Refills: 0 | Status: DISCONTINUED | OUTPATIENT
Start: 2023-09-07 | End: 2023-09-14

## 2023-09-07 RX ADMIN — HEPARIN SODIUM 5000 UNIT(S): 5000 INJECTION INTRAVENOUS; SUBCUTANEOUS at 05:35

## 2023-09-07 RX ADMIN — MIRTAZAPINE 15 MILLIGRAM(S): 45 TABLET, ORALLY DISINTEGRATING ORAL at 22:54

## 2023-09-07 RX ADMIN — DIPHENHYDRAMINE HYDROCHLORIDE AND LIDOCAINE HYDROCHLORIDE AND ALUMINUM HYDROXIDE AND MAGNESIUM HYDRO 10 MILLILITER(S): KIT at 18:49

## 2023-09-07 RX ADMIN — Medication 100 MILLIGRAM(S): at 14:12

## 2023-09-07 RX ADMIN — FLUCONAZOLE 100 MILLIGRAM(S): 150 TABLET ORAL at 14:13

## 2023-09-07 RX ADMIN — DIPHENHYDRAMINE HYDROCHLORIDE AND LIDOCAINE HYDROCHLORIDE AND ALUMINUM HYDROXIDE AND MAGNESIUM HYDRO 10 MILLILITER(S): KIT at 23:01

## 2023-09-07 RX ADMIN — SODIUM CHLORIDE 50 MILLILITER(S): 9 INJECTION, SOLUTION INTRAVENOUS at 19:02

## 2023-09-07 RX ADMIN — SODIUM ZIRCONIUM CYCLOSILICATE 10 GRAM(S): 10 POWDER, FOR SUSPENSION ORAL at 18:47

## 2023-09-07 RX ADMIN — SODIUM ZIRCONIUM CYCLOSILICATE 10 GRAM(S): 10 POWDER, FOR SUSPENSION ORAL at 10:03

## 2023-09-07 RX ADMIN — HEPARIN SODIUM 5000 UNIT(S): 5000 INJECTION INTRAVENOUS; SUBCUTANEOUS at 18:45

## 2023-09-07 NOTE — PROGRESS NOTE ADULT - PROBLEM SELECTOR PLAN 1
- ISO newly found lesions (presumably cancer) to liver and pelvic bones, likely lungs   - Severe generalized weakness, pt was scared to go home following IR biopsy 8/29, sent straight to [facility]. Minimal appetite, fatigue, unintentional weight loss. Denies dysphagia or odynophagia but difficulty initiating swallow and feels like food gets "stuck at the top of stomach", leading him to feel full easily and eat minimally. Says that intermittent abd pain is UNRELATED to food; avoiding food more so because of his early satiety.  - Speech + swallow eval with cineesophagram - recommending minced and moist with mildly thick liquids 2/2 silent aspiration. Pt strongly requesting regular diet despite this rec due to poor taste/dislike for M+M diet, expresses understanding of risks but emphasizes priority is increasing caloric intake.  - Considered barium esophagram + small bowel series however was deemed poor option 2/2 aspiration and pt's inability to drink large volumes of liquid at once  - Discussion w/ family  NGT carries risk of aspiration, would be poor choice for pt at this time and he cannot go home with it; d/c is goal so onc workup can be continued.   - PT eval --> rec home PT

## 2023-09-07 NOTE — PROGRESS NOTE ADULT - ASSESSMENT
Pt is an 83 y.o. M w/ PMHx HTN, BPH, recent findings of metastatic cancer to lung, liver, and pelvic bone w/ unclear primary source at this point, present with severe generalized weakness ISO electrolyte derangements including hyponatremia and hyperkalemia along w/ GREG, most likely due to pre-renal cause secondary to poor po intake.    Patient continues to have fatigue, poor appetite without n/v. Onc inpatient w/o any further workup. Outpatient appointment with oncology. Uptrending Tbili, although asymptomatic. Advanced GI plan for possible biliary stent.   Says it would be impossible for him to function at home as he can barely walk on his own from his bed to bathroom.  Pt is an 83 y.o. M w/ PMHx HTN, BPH, recent findings of metastatic cancer to lung, liver, and pelvic bone w/ unclear primary source at this point, present with severe generalized weakness ISO electrolyte derangements including hyponatremia and hyperkalemia along w/ GREG, most likely due to pre-renal cause secondary to poor po intake. 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 Currently with poor nutritions and worsening of GREG.     Patient continues to have fatigue, poor appetite without n/v. Onc inpatient w/o any further workup. Outpatient appointment with oncology. Uptrending Tbili, although asymptomatic. Advanced GI plan for possible biliary stent.   Says it would be impossible for him to function at home as he can barely walk on his own from his bed to bathroom.

## 2023-09-07 NOTE — PROGRESS NOTE ADULT - ASSESSMENT
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 bladder calculus s/p cystoscopic radha lithotripsy on 8/31/20;   and new findings of liver lesions on images who presents w/ hyperkalemia and GREG seen on outpatient lab work completed during scheduled IR liver biopsy earlier 8/29/23. Gi consulted for elevated bilirubin.    #Elevated bilirubin: likely 2/2 diffuse hepatic lesions vs biliary obstruction  - last MR 8/15 w/ hepatosplenomegaly with diffuse metastatic nodules throughout both lobes measuring up to 2.8 x 2.6 cm in the right inferior lobe, normal caliber ducts  - s/p EGD/ERCP 9/6: Esophageal plaques were found, suspicious for candidiasis. Biopsied. Benign-appearing esophageal stenosis. Dilated with Savary to 14mm. Gastritis. Biopsied. Gastric polyps Mild extrinsic compression of duodenal bulb  - A single localized biliary stricture was found in the lower third of the main bile duct. The biliary tree was swept and sludge was found. One covered metal stent was placed into the common bile duct (10 mm x 6 cm covered metal stent).    #Low po intake: Denies dysphagia or odynophagia, though endorses difficulty initiating swallow. Symptoms more suggestive of oropharyngeal dysphagia. May also have component of food avoidance due to pain. No nausea, vomiting.    Recommendations  - await pathology results  - trend CBC, CMP  - diet as tolerated    All recommendations are tentative until note is attested by attending.     Jillian Barajas, PGY5  Gastroenterology/Hepatology Fellow  Available on [employer] Teams  [phone] ([facility] Short Range Pager)  [phone] (Long Range Pager)    After 5pm, please contact the on-call GI fellow. [phone]

## 2023-09-07 NOTE — PROGRESS NOTE ADULT - ATTENDING COMMENTS
82 yo gentlemen w/ PMHx HTN, BPH, recent findings of metastatic cancer to lung, liver, and pelvic bone w/ unclear primary source at this point, present with severe generalized weakness c/b electrolyte derangements including hyponatremia and hyperkalemia along w/ GREG, most likely due to pre-renal cause secondary to poor po intake.    Family agrees to hold off NGT as discussed on 9/2 extensively. Patient attempting to eat more to regain strength.   Worsening #hyperbillrubinemia. S/P ERCP, bili improving. Patient with improved appetite.  Path +poorly differentiated neuroendocrine tumor, oncology follow up. Family and patient will decide whether to go towards the hospice route vs cancer targeting therapy based on their conversation with hematology/oncology.   Continue the rest of the work up and management as stated above. 84 yo gentlemen w/ PMHx HTN, BPH, recent findings of metastatic cancer to lung, liver, and pelvic bone w/ unclear primary source at this point, present with severe generalized weakness c/b electrolyte derangements including hyponatremia and hyperkalemia along w/ GREG, most likely due to pre-renal cause secondary to poor po intake.    Family agrees to hold off NGT as discussed on 9/2 extensively. Patient attempting to eat more to regain strength.   Worsening #hyperbillrubinemia. S/P ERCP, bili improving. Patient with improved appetite.  Path +poorly differentiated neuroendocrine tumor, oncology follow up. Family and patient will decide whether to go towards the hospice route vs cancer targeting therapy based on their conversation with hematology/oncology.   Worsening Cr today, will rehydrate, monitor Cr  LE edema- worsening- unclear etiology, possibly related to worsening hepatomegaly. LE U/s obtained, no dvt noted. Abdominal u/s showing hepatomegaly.   Continue the rest of the work up and management as stated above.

## 2023-09-07 NOTE — PROGRESS NOTE ADULT - ATTENDING COMMENTS
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 Extensive discussion with patient and family at bedside about risk and benefits of treating in the context of his deconditioning. He has an aggressive malignancy that has spread throughout his liver; the cancer is relatively chemo sensitive but his constitution is poor, providing chemotherapy may harm his tumor and also harm him and difficult to predict whether he would derive net benefit. He would like to discuss this with his family and we will support him either way he decides.  Marcus Gray MD PhD  Oncology Attending

## 2023-09-07 NOTE — PROGRESS NOTE ADULT - PROBLEM SELECTOR PLAN 2
- Known liver mets (vs primary tumor?) s/p biopsy with IR outpatient 8/29. Will f/u results - onc will email to expedite results   - CTAP on admission w/ hepatomegaly with presumed innumerable hepatic metastatic lesions, trace ascites, mild anasarca  - TBili continuing to increase at an increasing rate, 5.5 on 9/5 up from 1.0 at beginning of August.   - Oncology consulted: Elevated CEA (164), d-dimer (5418), LDH (621). Normal PSA, CA9-19, AFP tumor marker, Hepatitis non-reactive.  - Concern for possible biliary obstruction 2/2 rapidly elevated TBili and AlkPhos compared to earlier this month; MRCP w/ slight compression of CBD by liver. Pt w/o Sx of hyperbilirubinemia.   - Plan for ERCP with possible stent placement  - IR Biopsy - Undifferentiated Neuroendocrine tumor   - Onc w/o additional recs at this time  - Pt has initial consultation with Dr. Milagros Sol at [facility] Thurs. 9/7 at 11:30Am - Known liver mets (vs primary tumor?) s/p biopsy with IR outpatient 8/29. Will f/u results - onc will email to expedite results   - CTAP on admission w/ hepatomegaly with presumed innumerable hepatic metastatic lesions, trace ascites, mild anasarca  - TBili continuing to increase at an increasing rate, 5.5 on 9/5 up from 1.0 at beginning of August.   - Oncology consulted: Elevated CEA (164), d-dimer (5418), LDH (621). Normal PSA, CA9-19, AFP tumor marker, Hepatitis non-reactive.  - Concern for possible biliary obstruction 2/2 rapidly elevated TBili and AlkPhos compared to earlier this month; MRCP w/ slight compression of CBD by liver. Pt w/o Sx of hyperbilirubinemia.   - ERCP with stent placement on 9/6  - IR Biopsy - Undifferentiated Neuroendocrine tumor   - Onc w/o additional recs at this time  - Pt has initial consultation with Dr. Milagros Sol at [facility] Thurs. 9/7 at 11:30Am

## 2023-09-07 NOTE — PROGRESS NOTE ADULT - ASSESSMENT
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 Pinon [place]),  bladder calculus s/p cystoscopic radha lithotripsy on 8/31/20; and newly findings of suspected liver metastatic lesions on  image who presents w/ hyperkalemia and GREG seen on outpatient lab work during scheduled IR office visit (for liver biopsy) earlier 8/29/23.    #Suspected metastatic liver lesions   -around July 2023, he had worsening generalized weakness, malaise, abdominal pain, decreased appetite, and an unintentional >20 lb weight loss.   -went to the [facility] ED on 8/8/23 for these symptoms; CT chest/abd/p without contrast on 8/8/23 showing few small, less than 6 mm left pulmonary nodules. Diffuse heterogeneous low attenuation nodularity throughout the liver is suspicious for metastatic disease. Prostatomegaly. No bowel obstruction. degenerative bone changes.  -MRI on 8/15/23 which showed hepatosplenomegaly with diffuse metastatic nodules throughout both lobes along with multiple metastatic bone lesions throughout the pelvis (image and report available on [place] PACS).   -After this admission, 8/30 morning lab showing Na 129; Cl 93; K 5.1 Cr 1.81; ADS552; ; Total cynthia 3.4; Calcium 10.5; Lipase 83. Hb 12.4; WBC/PLT WNL   -CT abd/pelvis without contrast on 8/29 showing No hydronephrosis or stone. Redemonstration of hepatomegaly with presumed innumerable hepatic metastatic lesions. Trace ascites and mild anasarca. no lytic bone lesions seen;  degenerative bone changes.   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%      Recommendations  -Request previous medical record from his PCP including colonoscopy (pt reported that he had one several years ago showing polyps only) and PSA (per primary team PSA 3.5 in 4/2023)  --after this admission,  AFP, , PSA WNL ; ;  negative viral Hepatitis panel   - PT/PTT/fibrinogen wnl; D-dimer 5418 on 8/31  -Check LDH;  Uric acid, phos, K, Ca, mg daily in view of small cell neuroendocrine carcinoma (fast tumor tissue turnover) especially during chemo   -Uric acid 9.1 on 9/6 which is down trending from Uric acid 12.9 on 8/30; currently on allopurinol 100mg (renal dose) daily, continue with IVF; Renal consult in view of worsening GREG   --f/u Tbil/LFTs/Cr/CBC daily; Improving Tbil after ERCP stent placement on 9/6/23 by GI     -f/u GI consult recommendations    -f/u Palliative care; continue supportive care/nutritional/pain management;  -GI ppx and DVT ppx per primary team   -Case discussed with pt's med oncologist Dr. Sol at [facility] on 9/7/23: considering C1 carboplatin AUC 4+etoposide (flat dose 50mg) as inpatient chemo based on pt current conditions. During the encounter today 9/7, onc team discussed with pt and his family including wife, two sons at the bedside about chemo therapy and most common side effects. Pt is willing to consider it and will make decision tomorrow morning.   -Onc team will communicate with pt in the morning 9/8. If pt decides to pursue inpt chemo, will discuss with chemo nurse/pharmacy, and start chemo on 9/8 (likely afternoon).       Note is not finalized until signed by attending   Radha Gambino PGY6   hem & onc fellow   [phone] or Team

## 2023-09-07 NOTE — PROGRESS NOTE ADULT - PROBLEM SELECTOR PLAN 7
Diet: Recommendation was minced and moist with mildly thickened liquids + Ensure, however today 9/3 pt strongly requesting regular diet due to taste/texture preference and goal to attempt to gain weight and increase caloric intake. Risks discussed, pt expressed understanding but still prefers regular diet.    DVT ppx: SubQ heparin   Code Status: Extensive GOC discussion with pt at bedside. Says he has a lot to live for including family, grandkids and wants "everything done to keep him alive" including CPR, intubation, and pressors. Palliative made aware of pt but not formally consulted at this time. Likely will be more useful once primary diagnosis established and prognosis is more clear; pt's pain is intermittent well managed at this time   Dispo: d/c home with PT and potentially additional assistance to continue onc workup and management

## 2023-09-07 NOTE — PROGRESS NOTE ADULT - SUBJECTIVE AND OBJECTIVE BOX
INTERVAL HPI/OVERNIGHT EVENTS:  Patient S&E at bedside. No acute o/n events, Denied fever/chills, N/V, diarrhea, SOB at rest, chest pain. Reported that improved appetite and dysphagia, and ate some breakfast and lunch.     VITAL SIGNS:  T(F): 97.3 (09-07-23 @ 05:27)  HR: 60 (09-07-23 @ 05:27)  BP: 104/55 (09-07-23 @ 05:27)  RR: 18 (09-07-23 @ 05:27)  SpO2: 98% (09-07-23 @ 05:27)  Wt(kg): --    PHYSICAL EXAM:    Constitutional: NAD; Cachetic,   Eyes: EOMI, mild icteric sclera   Neck: supple  Respiratory:  no wheezes    Cardiovascular: RRR  Gastrointestinal: soft, NTND, + BS  Extremities: no cyanosis, clubbing or edema   Neurological: awake and alert      MEDICATIONS  (STANDING):  allopurinol 100 milliGRAM(s) Oral daily  FIRST- Mouthwash  BLM 10 milliLiter(s) Swish and Swallow four times a day  fluconAZOLE   Tablet 100 milliGRAM(s) Oral every 24 hours  heparin   Injectable 5000 Unit(s) SubCutaneous every 12 hours  lactated ringers. 1000 milliLiter(s) (50 mL/Hr) IV Continuous <Continuous>  mirtazapine 15 milliGRAM(s) Oral at bedtime  sodium zirconium cyclosilicate 10 Gram(s) Oral every 8 hours    MEDICATIONS  (PRN):  acetaminophen     Tablet .. 650 milliGRAM(s) Oral every 6 hours PRN Moderate Pain (4 - 6)  HYDROmorphone   Tablet 2 milliGRAM(s) Oral every 6 hours PRN Severe Pain (7 - 10)  polyethylene glycol 3350 17 Gram(s) Oral daily PRN Constipation  senna 2 Tablet(s) Oral at bedtime PRN Constipation      Allergies    Nuts (Angioedema)  No Known Drug Allergies  Iodine (Angioedema)  shellfish (Angioedema)    Intolerances        LABS:                        10.7   8.63  )-----------( 173      ( 06 Sep 2023 03:10 )             31.4     09-07    134<L>  |  96<L>  |  54<H>  ----------------------------<  80  5.3   |  21<L>  |  2.17<H>    Ca    9.7      07 Sep 2023 05:20  Phos  3.8     09-07  Mg     2.10     09-07    TPro  5.8<L>  /  Alb  3.0<L>  /  TBili  6.3<H>  /  DBili  x   /  AST  358<H>  /  ALT  101<H>  /  AlkPhos  630<H>  09-07    PT/INR - ( 06 Sep 2023 03:10 )   PT: 13.3 sec;   INR: 1.18 ratio         PTT - ( 06 Sep 2023 03:10 )  PTT:37.9 sec  Urinalysis Basic - ( 07 Sep 2023 05:20 )    Color: x / Appearance: x / SG: x / pH: x  Gluc: 80 mg/dL / Ketone: x  / Bili: x / Urobili: x   Blood: x / Protein: x / Nitrite: x   Leuk Esterase: x / RBC: x / WBC x   Sq Epi: x / Non Sq Epi: x / Bacteria: x        RADIOLOGY & ADDITIONAL TESTS:  Studies reviewed.

## 2023-09-07 NOTE — PROGRESS NOTE ADULT - PROBLEM SELECTOR PLAN 4
- Uric acid 12.9 likely ISO metastatic cancer  - Started allopurinol 100mg (renally dosed) daily  - Continue IVF for renal protection; pt's family also requested IVF bc they feel he is not adequately hydrating by mouth - Uric acid 12.9 likely ISO metastatic cancer  - Started allopurinol 100mg (renally dosed) daily  - IVF - for renal protection if tolerated, pt's family also requested IVF bc they feel he is not adequately hydrating by mouth

## 2023-09-07 NOTE — PROGRESS NOTE ADULT - PROBLEM SELECTOR PLAN 3
- BUN/Cr 46/1.67 on admission likely pre-renal ISO poor po intake. Cr continuing to improve slightly, 40/1.53 9/3  - Continue LR 75 cc/hr  - Avoid nephrotoxins, renally dose meds - BUN/Cr 46/1.67 on admission likely pre-renal ISO poor po intake.   GREG - Currently BUN/Cr - 54/2.17  f/u on Urine lytes and bladder scans.   - On and off LR 75 cc/hr - for suspected Pre-Renal etiology, however given patients b/l edema, hold for now.   - Avoid nephrotoxins, renally dose meds

## 2023-09-07 NOTE — PROGRESS NOTE ADULT - SUBJECTIVE AND OBJECTIVE BOX
Gastroenterology/Hepatology Progress Note    Interval Events: Patient denies     Allergies:  Nuts (Angioedema)  No Known Drug Allergies  Iodine (Angioedema)  shellfish (Angioedema)    Hospital Medications:  acetaminophen     Tablet .. 650 milliGRAM(s) Oral every 6 hours PRN  allopurinol 100 milliGRAM(s) Oral daily  fluconAZOLE   Tablet 100 milliGRAM(s) Oral every 24 hours  heparin   Injectable 5000 Unit(s) SubCutaneous every 12 hours  HYDROmorphone   Tablet 2 milliGRAM(s) Oral every 6 hours PRN  mirtazapine 15 milliGRAM(s) Oral at bedtime  polyethylene glycol 3350 17 Gram(s) Oral daily PRN  senna 2 Tablet(s) Oral at bedtime PRN  sodium zirconium cyclosilicate 10 Gram(s) Oral every 8 hours    ROS: 14 point ROS negative unless otherwise state in subjective    PHYSICAL EXAM:   Vital Signs:  Vital Signs Last 24 Hrs  T(C): 36.3 (07 Sep 2023 05:27), Max: 36.5 (06 Sep 2023 16:37)  T(F): 97.3 (07 Sep 2023 05:27), Max: 97.7 (06 Sep 2023 16:37)  HR: 60 (07 Sep 2023 05:27) (55 - 70)  BP: 104/55 (07 Sep 2023 05:27) (93/63 - 132/78)  BP(mean): --  RR: 18 (07 Sep 2023 05:27) (14 - 19)  SpO2: 98% (07 Sep 2023 05:27) (98% - 100%)    Parameters below as of 07 Sep 2023 05:27  Patient On (Oxygen Delivery Method): room air    Daily Height in cm: 182.9 (06 Sep 2023 13:34)    Daily     GENERAL:  No acute distress  HEENT:  NCAT, no scleral icterus  CHEST: no resp distress  HEART:  RRR  ABDOMEN:  Soft, non-tender, non-distended  EXTREMITIES:  No cyanosis, clubbing, or edema  SKIN:  No rash/erythema/ecchymoses  NEURO:  Alert and oriented x 3    LABS:                        10.7   8.63  )-----------( 173      ( 06 Sep 2023 03:10 )             31.4       09-07    134<L>  |  96<L>  |  54<H>  ----------------------------<  80  5.3   |  21<L>  |  2.17<H>    Ca    9.7      07 Sep 2023 05:20  Phos  3.8     09-07  Mg     2.10     09-07    TPro  5.8<L>  /  Alb  3.0<L>  /  TBili  6.3<H>  /  DBili  x   /  AST  358<H>  /  ALT  101<H>  /  AlkPhos  630<H>  09-07    LIVER FUNCTIONS - ( 07 Sep 2023 05:20 )  Alb: 3.0 g/dL / Pro: 5.8 g/dL / ALK PHOS: 630 U/L / ALT: 101 U/L / AST: 358 U/L / GGT: x           PT/INR - ( 06 Sep 2023 03:10 )   PT: 13.3 sec;   INR: 1.18 ratio         PTT - ( 06 Sep 2023 03:10 )  PTT:37.9 sec  Urinalysis Basic - ( 07 Sep 2023 05:20 )    Color: x / Appearance: x / SG: x / pH: x  Gluc: 80 mg/dL / Ketone: x  / Bili: x / Urobili: x   Blood: x / Protein: x / Nitrite: x   Leuk Esterase: x / RBC: x / WBC x   Sq Epi: x / Non Sq Epi: x / Bacteria: x    Imaging:           Gastroenterology/Hepatology Progress Note    Interval Events: Patient denies abdominal pain, nausea, vomiting, fevers, chills this am.  Mild downtrend in Tbili and transaminases.    Allergies:  Nuts (Angioedema)  No Known Drug Allergies  Iodine (Angioedema)  shellfish (Angioedema)    Hospital Medications:  acetaminophen     Tablet .. 650 milliGRAM(s) Oral every 6 hours PRN  allopurinol 100 milliGRAM(s) Oral daily  fluconAZOLE   Tablet 100 milliGRAM(s) Oral every 24 hours  heparin   Injectable 5000 Unit(s) SubCutaneous every 12 hours  HYDROmorphone   Tablet 2 milliGRAM(s) Oral every 6 hours PRN  mirtazapine 15 milliGRAM(s) Oral at bedtime  polyethylene glycol 3350 17 Gram(s) Oral daily PRN  senna 2 Tablet(s) Oral at bedtime PRN  sodium zirconium cyclosilicate 10 Gram(s) Oral every 8 hours    ROS: 14 point ROS negative unless otherwise state in subjective    PHYSICAL EXAM:   Vital Signs:  Vital Signs Last 24 Hrs  T(C): 36.3 (07 Sep 2023 05:27), Max: 36.5 (06 Sep 2023 16:37)  T(F): 97.3 (07 Sep 2023 05:27), Max: 97.7 (06 Sep 2023 16:37)  HR: 60 (07 Sep 2023 05:27) (55 - 70)  BP: 104/55 (07 Sep 2023 05:27) (93/63 - 132/78)  BP(mean): --  RR: 18 (07 Sep 2023 05:27) (14 - 19)  SpO2: 98% (07 Sep 2023 05:27) (98% - 100%)    Parameters below as of 07 Sep 2023 05:27  Patient On (Oxygen Delivery Method): room air    Daily Height in cm: 182.9 (06 Sep 2023 13:34)    Daily     GENERAL:  No acute distress  HEENT:  NCAT, + scleral icterus  CHEST: no resp distress  HEART:  RRR  ABDOMEN:  Soft, non-tender, non-distended  EXTREMITIES:  No cyanosis, clubbing, or edema  SKIN:  No rash/erythema/ecchymoses. +Jaundice  NEURO:  Alert and oriented x 3    LABS:                        10.7   8.63  )-----------( 173      ( 06 Sep 2023 03:10 )             31.4       09-07    134<L>  |  96<L>  |  54<H>  ----------------------------<  80  5.3   |  21<L>  |  2.17<H>    Ca    9.7      07 Sep 2023 05:20  Phos  3.8     09-07  Mg     2.10     09-07    TPro  5.8<L>  /  Alb  3.0<L>  /  TBili  6.3<H>  /  DBili  x   /  AST  358<H>  /  ALT  101<H>  /  AlkPhos  630<H>  09-07    LIVER FUNCTIONS - ( 07 Sep 2023 05:20 )  Alb: 3.0 g/dL / Pro: 5.8 g/dL / ALK PHOS: 630 U/L / ALT: 101 U/L / AST: 358 U/L / GGT: x           PT/INR - ( 06 Sep 2023 03:10 )   PT: 13.3 sec;   INR: 1.18 ratio         PTT - ( 06 Sep 2023 03:10 )  PTT:37.9 sec  Urinalysis Basic - ( 07 Sep 2023 05:20 )    Color: x / Appearance: x / SG: x / pH: x  Gluc: 80 mg/dL / Ketone: x  / Bili: x / Urobili: x   Blood: x / Protein: x / Nitrite: x   Leuk Esterase: x / RBC: x / WBC x   Sq Epi: x / Non Sq Epi: x / Bacteria: x    Imaging:    [facility]  _______________________________________________________________________________  Patient Name: Yovani Mercer           Procedure Date: 9/6/2023 2:40 PM  MRN: [id number]                     Account Number: [id number]  YOB: 1940               Admit Type: Inpatient  Room: [place] [address]                         Gender: Male  Attending MD: RENAY SALCEDO MD      _______________________________________________________________________________     Procedure:          ERCP  Indications:         Abnormal MRCP, Jaundice, Elevated liver enzymes  Providers:           RENAY SALCEDO MD  Medicines:           General Anesthesia, Indomethacin 100 mg AK  Complications:       No immediate complications.  Procedure:           Pre-Anesthesia Assessment:                       - Prior to the procedure, a History and Physical was                        performed, and patient medications and allergies were                        reviewed. The patient is competent.The risks and                        benefits of the procedure and the sedation options and                        risks were discussed with the patient. All questions                        were answered and informed consent was obtained. Patient                       identification and proposed procedure were verified by                        the physician, the nurse, the anesthesiologist and the                        anesthetist in the pre-procedure area in the procedure      room. Mental Status Examination: alert and oriented.                        Airway Examination: normal oropharyngeal airway and neck                        mobility. Respiratory Examination: clear to                        auscultation. CV Examination: normal. Prophylactic                        Antibiotics: The patient does not require prophylactic                        antibiotics. Prior Anticoagulants: The patient has taken                        no previous anticoagulant or antiplatelet agents. ASA                        Grade Assessment: III - A patient with severe systemic                        disease. After reviewing the risks and benefits, the                        patient was deemed in satisfactory condition to undergo                  the procedure. The anesthesia plan was to use general                        anesthesia. Immediately prior to administration of                        medications, the patient was re-assessed for adequacy to                        receive sedatives. The heart rate, respiratory rate,                        oxygen saturations, blood pressure, adequacy of                        pulmonary ventilation, and response to care were                        monitored throughout the procedure. The physical status                        of the patient was re-assessed after the procedure.                       After obtaining informed consent, the scope was passed                        under direct vision. Throughout the procedure, the                    patient's blood pressure, pulse, and oxygen saturations                        were monitored continuously. The Endoscope was                        introduced through the mouth, and advanced to the                        duodenum and used to locate the major papilla. The                        Duodenoscope was introduced through the mouth, and                        advanced to the duodenum and used to inject contrast                        into the bile duct. The ERCP was accomplished without                        difficulty. The patient tolerated the procedure well.                                                                                   Findings:       The  film was normal. A standard esophagogastroduodenoscopy scope        was used for the examination of the upper gastrointestinal tract. The        scope was passed under direct vision through the upper GI tract. Plaques        were found in the entire esophagus. One benign-appearing, intrinsic        stenosis was found 20 cm from the incisors. This stenosis measured 1.1        cm (inner diameter) x less than one cm (in length). Gastric polyps seen.        Patchy inflammation characterized by erosions and erythema was found in        the gastric body and in the gastric antrum. An extrinsic deformity was        found in the duodenal bulb. Biopsies were taken in the gastric body and        in the gastric antrum through the esophagogastroduodenoscope with the        cold forceps for histology. Biopsies were taken in the middle third of        the esophagus through the esophagogastroduodenoscope with the cold        forceps for histology. The standard endoscope was exchanged for a        duodenoscope, which was unable to traverse the upper esophagus. The        standard endoscope was reintroduced into the patient. A guidewire was        placed and the scope was withdrawn. Dilation was performed in the upper        third of the esophagus with a Savary dilator with no resistance at 14        mm. The standard endoscope was exchanged for a duodenoscope, which was        advanced to the level of the ampulla. The major papilla was normal. A        short 0.025 inch Jagwire was passed into the biliary tree. The Jagtome        sphincterotome was passed over the guidewire and the bile duct was then        deeply cannulated. Contrast was injected to confirm location. I        personally interpreted the bile duct images. Image quality was adequate.        The biliary tree was swept with an 11.5 mm balloon starting at the        bifurcation. Sludge was swept from the duct. Resistance was felt at the        distal duct consistent with a stricture. Preparations were made for        cholangiography using balloon occlusion technique. A balloon-tipped        catheter was advanced to the hepatic duct bifurcation. The balloon was        inflated to 11.5 mm in size. Contrast was then injected into the biliary        tree. The intrahepatic biliary tree appeared attenuated. The lower third        of the main bile duct contained a single stenosis. Residual contrast was        swept from the bile duct with multiple balloon sweeps. One 10 mm by 6 cm        covered metal stent was placed into the common bile duct. Bile flowed        through the stent. The stent was in good position. Indomethacin 100 mg        was given via suppository to decrease the risk of post-ERCP pancreatitis        (PEP). The main pancreatic duct was neither attempted, nor attained. At        the conclusion of the exam, no air was visualized in unusual places. The        total fluoroscopy exposure time was 2 minutes.                                                                                   Impression:          EGD:                       - Esophageal plaques were found, suspicious for                        candidiasis. Biopsied.                       - Benign-appearing esophageal stenosis. Dilated with                        Savary to 14mm.                       - Gastritis. Biopsied.  - Gastric polyps                       - Mild extrinsic compression of duodenal bulb                       ERCP:                       - The major papilla appeared normal.                       - A single localized biliary stricture was found in the                        lower third of the main bile duct.                       - The biliary tree was swept and sludge was found.                       - One covered metal stent was placed into the common                        bile duct (10 mm x 6 cm covered metal stent).  Recommendation:      - Discharge patient to home (with escort).                       - Patient has a contact number available for                        emergencies. The signs and symptoms of potential delayed          complications were discussed with the patient. Return to                        normal activities tomorrow. Written discharge                        instructions were provided to the patient.                       - Clear liquid diet              - Return to referring physician as previously scheduled.                       - Please call [phone] with questions or concerns.                       - Await path results.                       Attending Participation:       I personally performed the entire procedure.                                                                                     ___________________  RENAY SALCEDO MD  9/6/2023 4:51:02 PM  Numberof Addenda: 0    Note Initiated On: 9/6/2023 2:40 PM

## 2023-09-07 NOTE — PROGRESS NOTE ADULT - SUBJECTIVE AND OBJECTIVE BOX
***************************************************************  Marco A Melo  (PGY1) Internal Medicine  On TEAMS  ***************************************************************    PROGRESS NOTE:     Patient is a 83y old  Male who presents with a chief complaint of Hyperkalemia, GREG (06 Sep 2023 06:28)      SUBJECTIVE / OVERNIGHT EVENTS:      MEDICATIONS  (STANDING):  allopurinol 100 milliGRAM(s) Oral daily  fluconAZOLE   Tablet 100 milliGRAM(s) Oral every 24 hours  heparin   Injectable 5000 Unit(s) SubCutaneous every 12 hours  lactated ringers. 1000 milliLiter(s) (50 mL/Hr) IV Continuous <Continuous>  mirtazapine 15 milliGRAM(s) Oral at bedtime    MEDICATIONS  (PRN):  acetaminophen     Tablet .. 650 milliGRAM(s) Oral every 6 hours PRN Moderate Pain (4 - 6)  HYDROmorphone   Tablet 2 milliGRAM(s) Oral every 6 hours PRN Severe Pain (7 - 10)  polyethylene glycol 3350 17 Gram(s) Oral daily PRN Constipation  senna 2 Tablet(s) Oral at bedtime PRN Constipation      CAPILLARY BLOOD GLUCOSE        I&O's Summary      PHYSICAL EXAM:  Vital Signs Last 24 Hrs  T(C): 36.3 (07 Sep 2023 05:27), Max: 36.5 (06 Sep 2023 16:37)  T(F): 97.3 (07 Sep 2023 05:27), Max: 97.7 (06 Sep 2023 16:37)  HR: 60 (07 Sep 2023 05:27) (55 - 70)  BP: 104/55 (07 Sep 2023 05:27) (93/63 - 132/78)  BP(mean): --  RR: 18 (07 Sep 2023 05:27) (14 - 19)  SpO2: 98% (07 Sep 2023 05:27) (98% - 100%)    Parameters below as of 07 Sep 2023 05:27  Patient On (Oxygen Delivery Method): room air    General : NAD, on chair, cachetic  CV: RRR no R/M/G  Lungs: CTAB  Abd : Soft, non-tender, non-distended  Extremities : 1+ b/l LE Edema, without tenderness or erythema. , 4-5/5 strength UE b/l.  Neuro : A&Ox3    LABS:                        10.7   8.63  )-----------( 173      ( 06 Sep 2023 03:10 )             31.4     09-06    132<L>  |  94<L>  |  45<H>  ----------------------------<  87  4.6   |  24  |  1.57<H>    Ca    9.7      06 Sep 2023 03:10  Phos  3.0     09-06  Mg     2.20     09-06    TPro  6.2  /  Alb  3.2<L>  /  TBili  6.6<H>  /  DBili  x   /  AST  395<H>  /  ALT  119<H>  /  AlkPhos  718<H>  09-06    PT/INR - ( 06 Sep 2023 03:10 )   PT: 13.3 sec;   INR: 1.18 ratio         PTT - ( 06 Sep 2023 03:10 )  PTT:37.9 sec      Urinalysis Basic - ( 06 Sep 2023 03:10 )    Color: x / Appearance: x / SG: x / pH: x  Gluc: 87 mg/dL / Ketone: x  / Bili: x / Urobili: x   Blood: x / Protein: x / Nitrite: x   Leuk Esterase: x / RBC: x / WBC x   Sq Epi: x / Non Sq Epi: x / Bacteria: x          Medications (Standing)  MEDICATIONS  (STANDING):  allopurinol 100 milliGRAM(s) Oral daily  fluconAZOLE   Tablet 100 milliGRAM(s) Oral every 24 hours  heparin   Injectable 5000 Unit(s) SubCutaneous every 12 hours  lactated ringers. 1000 milliLiter(s) (50 mL/Hr) IV Continuous <Continuous>  mirtazapine 15 milliGRAM(s) Oral at bedtime        COORDINATION OF CARE:  Care Discussed with Consultants/Other Providers [Y/N]:  Prior or Outpatient Records Reviewed [Y/N]:   ***************************************************************  Marco A Melo  (PGY1) Internal Medicine  On TEAMS  ***************************************************************    PROGRESS NOTE:     Patient is a 83y old  Male who presents with a chief complaint of Hyperkalemia, GREG (06 Sep 2023 06:28)      SUBJECTIVE / OVERNIGHT EVENTS: No acute overnight events. More fatigued than usual after ERCP yesterday. Poor diet. BM yesterday.   Increased swelling in LE without pain. Patient denies fever, chills, nausea, vomitting, chest pain, shortness of breath, abdominal pain, diarrhea or constipation.     MEDICATIONS  (STANDING):  allopurinol 100 milliGRAM(s) Oral daily  fluconAZOLE   Tablet 100 milliGRAM(s) Oral every 24 hours  heparin   Injectable 5000 Unit(s) SubCutaneous every 12 hours  lactated ringers. 1000 milliLiter(s) (50 mL/Hr) IV Continuous <Continuous>  mirtazapine 15 milliGRAM(s) Oral at bedtime    MEDICATIONS  (PRN):  acetaminophen     Tablet .. 650 milliGRAM(s) Oral every 6 hours PRN Moderate Pain (4 - 6)  HYDROmorphone   Tablet 2 milliGRAM(s) Oral every 6 hours PRN Severe Pain (7 - 10)  polyethylene glycol 3350 17 Gram(s) Oral daily PRN Constipation  senna 2 Tablet(s) Oral at bedtime PRN Constipation      CAPILLARY BLOOD GLUCOSE        I&O's Summary      PHYSICAL EXAM:  Vital Signs Last 24 Hrs  T(C): 36.3 (07 Sep 2023 05:27), Max: 36.5 (06 Sep 2023 16:37)  T(F): 97.3 (07 Sep 2023 05:27), Max: 97.7 (06 Sep 2023 16:37)  HR: 60 (07 Sep 2023 05:27) (55 - 70)  BP: 104/55 (07 Sep 2023 05:27) (93/63 - 132/78)  BP(mean): --  RR: 18 (07 Sep 2023 05:27) (14 - 19)  SpO2: 98% (07 Sep 2023 05:27) (98% - 100%)    Parameters below as of 07 Sep 2023 05:27  Patient On (Oxygen Delivery Method): room air    General : NAD, cachetic  CV: RRR no R/M/G  Lungs: CTAB  Abd : Soft, non-tender, non-distended  Extremities : 2+ b/l LE Edema, without tenderness or erythema. , 4-5/5 strength UE b/l. R arm bruising without palpable hematoma  Neuro : A&Ox3    LABS:                        10.7   8.63  )-----------( 173      ( 06 Sep 2023 03:10 )             31.4     09-06    132<L>  |  94<L>  |  45<H>  ----------------------------<  87  4.6   |  24  |  1.57<H>    Ca    9.7      06 Sep 2023 03:10  Phos  3.0     09-06  Mg     2.20     09-06    TPro  6.2  /  Alb  3.2<L>  /  TBili  6.6<H>  /  DBili  x   /  AST  395<H>  /  ALT  119<H>  /  AlkPhos  718<H>  09-06    PT/INR - ( 06 Sep 2023 03:10 )   PT: 13.3 sec;   INR: 1.18 ratio         PTT - ( 06 Sep 2023 03:10 )  PTT:37.9 sec      Urinalysis Basic - ( 06 Sep 2023 03:10 )    Color: x / Appearance: x / SG: x / pH: x  Gluc: 87 mg/dL / Ketone: x  / Bili: x / Urobili: x   Blood: x / Protein: x / Nitrite: x   Leuk Esterase: x / RBC: x / WBC x   Sq Epi: x / Non Sq Epi: x / Bacteria: x          Medications (Standing)  MEDICATIONS  (STANDING):  allopurinol 100 milliGRAM(s) Oral daily  fluconAZOLE   Tablet 100 milliGRAM(s) Oral every 24 hours  heparin   Injectable 5000 Unit(s) SubCutaneous every 12 hours  lactated ringers. 1000 milliLiter(s) (50 mL/Hr) IV Continuous <Continuous>  mirtazapine 15 milliGRAM(s) Oral at bedtime        COORDINATION OF CARE:  Care Discussed with Consultants/Other Providers [Y/N]:  Prior or Outpatient Records Reviewed [Y/N]:

## 2023-09-08 LAB
ALBUMIN SERPL ELPH-MCNC: 2.9 G/DL — LOW (ref 3.3–5)
ALP SERPL-CCNC: 622 U/L — HIGH (ref 40–120)
ALT FLD-CCNC: 99 U/L — HIGH (ref 4–41)
ANION GAP SERPL CALC-SCNC: 13 MMOL/L — SIGNIFICANT CHANGE UP (ref 7–14)
AST SERPL-CCNC: 416 U/L — HIGH (ref 4–40)
BASOPHILS # BLD AUTO: 0.02 K/UL — SIGNIFICANT CHANGE UP (ref 0–0.2)
BASOPHILS NFR BLD AUTO: 0.2 % — SIGNIFICANT CHANGE UP (ref 0–2)
BILIRUB SERPL-MCNC: 5.6 MG/DL — HIGH (ref 0.2–1.2)
BUN SERPL-MCNC: 65 MG/DL — HIGH (ref 7–23)
CALCIUM SERPL-MCNC: 9.3 MG/DL — SIGNIFICANT CHANGE UP (ref 8.4–10.5)
CHLORIDE SERPL-SCNC: 98 MMOL/L — SIGNIFICANT CHANGE UP (ref 98–107)
CO2 SERPL-SCNC: 24 MMOL/L — SIGNIFICANT CHANGE UP (ref 22–31)
CREAT SERPL-MCNC: 2.79 MG/DL — HIGH (ref 0.5–1.3)
EGFR: 22 ML/MIN/1.73M2 — LOW
EOSINOPHIL # BLD AUTO: 0.13 K/UL — SIGNIFICANT CHANGE UP (ref 0–0.5)
EOSINOPHIL NFR BLD AUTO: 1.6 % — SIGNIFICANT CHANGE UP (ref 0–6)
GLUCOSE SERPL-MCNC: 89 MG/DL — SIGNIFICANT CHANGE UP (ref 70–99)
HCT VFR BLD CALC: 29.3 % — LOW (ref 39–50)
HGB BLD-MCNC: 9.8 G/DL — LOW (ref 13–17)
IANC: 6.18 K/UL — SIGNIFICANT CHANGE UP (ref 1.8–7.4)
IMM GRANULOCYTES NFR BLD AUTO: 1.1 % — HIGH (ref 0–0.9)
LYMPHOCYTES # BLD AUTO: 0.72 K/UL — LOW (ref 1–3.3)
LYMPHOCYTES # BLD AUTO: 8.7 % — LOW (ref 13–44)
MAGNESIUM SERPL-MCNC: 2.2 MG/DL — SIGNIFICANT CHANGE UP (ref 1.6–2.6)
MCHC RBC-ENTMCNC: 31.5 PG — SIGNIFICANT CHANGE UP (ref 27–34)
MCHC RBC-ENTMCNC: 33.4 GM/DL — SIGNIFICANT CHANGE UP (ref 32–36)
MCV RBC AUTO: 94.2 FL — SIGNIFICANT CHANGE UP (ref 80–100)
MONOCYTES # BLD AUTO: 1.18 K/UL — HIGH (ref 0–0.9)
MONOCYTES NFR BLD AUTO: 14.2 % — HIGH (ref 2–14)
NEUTROPHILS # BLD AUTO: 6.18 K/UL — SIGNIFICANT CHANGE UP (ref 1.8–7.4)
NEUTROPHILS NFR BLD AUTO: 74.2 % — SIGNIFICANT CHANGE UP (ref 43–77)
NRBC # BLD: 0 /100 WBCS — SIGNIFICANT CHANGE UP (ref 0–0)
NRBC # FLD: 0 K/UL — SIGNIFICANT CHANGE UP (ref 0–0)
PHOSPHATE SERPL-MCNC: 2.8 MG/DL — SIGNIFICANT CHANGE UP (ref 2.5–4.5)
PLATELET # BLD AUTO: 210 K/UL — SIGNIFICANT CHANGE UP (ref 150–400)
POTASSIUM SERPL-MCNC: 4.6 MMOL/L — SIGNIFICANT CHANGE UP (ref 3.5–5.3)
POTASSIUM SERPL-SCNC: 4.6 MMOL/L — SIGNIFICANT CHANGE UP (ref 3.5–5.3)
PROT SERPL-MCNC: 5.9 G/DL — LOW (ref 6–8.3)
RBC # BLD: 3.11 M/UL — LOW (ref 4.2–5.8)
RBC # FLD: 15.5 % — HIGH (ref 10.3–14.5)
SODIUM SERPL-SCNC: 135 MMOL/L — SIGNIFICANT CHANGE UP (ref 135–145)
URATE SERPL-MCNC: 10.4 MG/DL — HIGH (ref 3.4–8.8)
WBC # BLD: 8.32 K/UL — SIGNIFICANT CHANGE UP (ref 3.8–10.5)
WBC # FLD AUTO: 8.32 K/UL — SIGNIFICANT CHANGE UP (ref 3.8–10.5)

## 2023-09-08 PROCEDURE — 99222 1ST HOSP IP/OBS MODERATE 55: CPT

## 2023-09-08 PROCEDURE — 99497 ADVNCD CARE PLAN 30 MIN: CPT | Mod: GC,25

## 2023-09-08 PROCEDURE — 76770 US EXAM ABDO BACK WALL COMP: CPT | Mod: 26

## 2023-09-08 PROCEDURE — 99232 SBSQ HOSP IP/OBS MODERATE 35: CPT | Mod: GC

## 2023-09-08 RX ORDER — PALONOSETRON HYDROCHLORIDE 0.25 MG/5ML
0.25 INJECTION, SOLUTION INTRAVENOUS ONCE
Refills: 0 | Status: DISCONTINUED | OUTPATIENT
Start: 2023-09-08 | End: 2023-09-08

## 2023-09-08 RX ORDER — ETOPOSIDE 20 MG/ML
50 VIAL (ML) INTRAVENOUS EVERY 24 HOURS
Refills: 0 | Status: DISCONTINUED | OUTPATIENT
Start: 2023-09-08 | End: 2023-09-08

## 2023-09-08 RX ORDER — ALBUMIN HUMAN 25 %
250 VIAL (ML) INTRAVENOUS EVERY 8 HOURS
Refills: 0 | Status: COMPLETED | OUTPATIENT
Start: 2023-09-08 | End: 2023-09-10

## 2023-09-08 RX ORDER — MIDODRINE HYDROCHLORIDE 2.5 MG/1
5 TABLET ORAL EVERY 8 HOURS
Refills: 0 | Status: DISCONTINUED | OUTPATIENT
Start: 2023-09-08 | End: 2023-09-14

## 2023-09-08 RX ORDER — HYDROCORTISONE 20 MG
100 TABLET ORAL ONCE
Refills: 0 | Status: DISCONTINUED | OUTPATIENT
Start: 2023-09-08 | End: 2023-09-08

## 2023-09-08 RX ORDER — MIDODRINE HYDROCHLORIDE 2.5 MG/1
5 TABLET ORAL EVERY 8 HOURS
Refills: 0 | Status: DISCONTINUED | OUTPATIENT
Start: 2023-09-08 | End: 2023-09-08

## 2023-09-08 RX ORDER — FOSAPREPITANT DIMEGLUMINE 150 MG/5ML
150 INJECTION, POWDER, LYOPHILIZED, FOR SOLUTION INTRAVENOUS ONCE
Refills: 0 | Status: DISCONTINUED | OUTPATIENT
Start: 2023-09-08 | End: 2023-09-08

## 2023-09-08 RX ORDER — ALBUMIN HUMAN 25 %
250 VIAL (ML) INTRAVENOUS EVERY 8 HOURS
Refills: 0 | Status: DISCONTINUED | OUTPATIENT
Start: 2023-09-08 | End: 2023-09-08

## 2023-09-08 RX ORDER — ALBUMIN HUMAN 25 %
250 VIAL (ML) INTRAVENOUS ONCE
Refills: 0 | Status: COMPLETED | OUTPATIENT
Start: 2023-09-08 | End: 2023-09-08

## 2023-09-08 RX ORDER — CARBOPLATIN 50 MG
170 VIAL (EA) INTRAVENOUS ONCE
Refills: 0 | Status: DISCONTINUED | OUTPATIENT
Start: 2023-09-08 | End: 2023-09-08

## 2023-09-08 RX ORDER — DEXAMETHASONE 0.5 MG/5ML
8 ELIXIR ORAL ONCE
Refills: 0 | Status: DISCONTINUED | OUTPATIENT
Start: 2023-09-08 | End: 2023-09-08

## 2023-09-08 RX ADMIN — HEPARIN SODIUM 5000 UNIT(S): 5000 INJECTION INTRAVENOUS; SUBCUTANEOUS at 05:07

## 2023-09-08 RX ADMIN — HYDROMORPHONE HYDROCHLORIDE 2 MILLIGRAM(S): 2 INJECTION INTRAMUSCULAR; INTRAVENOUS; SUBCUTANEOUS at 22:38

## 2023-09-08 RX ADMIN — Medication 125 MILLILITER(S): at 22:36

## 2023-09-08 RX ADMIN — FLUCONAZOLE 100 MILLIGRAM(S): 150 TABLET ORAL at 14:48

## 2023-09-08 RX ADMIN — HYDROMORPHONE HYDROCHLORIDE 2 MILLIGRAM(S): 2 INJECTION INTRAMUSCULAR; INTRAVENOUS; SUBCUTANEOUS at 23:38

## 2023-09-08 RX ADMIN — SODIUM ZIRCONIUM CYCLOSILICATE 10 GRAM(S): 10 POWDER, FOR SUSPENSION ORAL at 01:51

## 2023-09-08 RX ADMIN — SODIUM ZIRCONIUM CYCLOSILICATE 10 GRAM(S): 10 POWDER, FOR SUSPENSION ORAL at 12:19

## 2023-09-08 RX ADMIN — HEPARIN SODIUM 5000 UNIT(S): 5000 INJECTION INTRAVENOUS; SUBCUTANEOUS at 17:43

## 2023-09-08 RX ADMIN — MIDODRINE HYDROCHLORIDE 5 MILLIGRAM(S): 2.5 TABLET ORAL at 22:38

## 2023-09-08 RX ADMIN — Medication 100 MILLIGRAM(S): at 14:48

## 2023-09-08 RX ADMIN — SODIUM ZIRCONIUM CYCLOSILICATE 10 GRAM(S): 10 POWDER, FOR SUSPENSION ORAL at 20:02

## 2023-09-08 RX ADMIN — SODIUM CHLORIDE 50 MILLILITER(S): 9 INJECTION, SOLUTION INTRAVENOUS at 03:13

## 2023-09-08 RX ADMIN — MIRTAZAPINE 15 MILLIGRAM(S): 45 TABLET, ORALLY DISINTEGRATING ORAL at 22:38

## 2023-09-08 RX ADMIN — Medication 125 MILLILITER(S): at 12:19

## 2023-09-08 NOTE — CONSULT NOTE ADULT - SUBJECTIVE AND OBJECTIVE BOX
[facility] DIVISION OF KIDNEY DISEASES AND HYPERTENSION -- [phone]  -- INITIAL CONSULT NOTE  --------------------------------------------------------------------------------  HPI:    Patient is an 83M with PMHx of HTN, BPH, arthritis, and liver lesions presenting w/ hyperkalemia and GREG seen on outpatient lab work completed during scheduled IR liver biopsy earlier tin day    The pt explained ~1 month ago (July 2023), he began experiencing gradual generalized weakness, malaise, abdominal pain, decreased appetite, and an unintentional 10-15 lb weight loss. Pt states that he previously had a good appetite but gets exhausted easily while eating and loses his appetite after only a few bites of food; he has also noticed that with the first bite/first sip he feels a choking sensation, which resolves as  He went to the [facility] ED on 8/8/23 for these symptoms where he had a CT done showing pulmonary nodules and liver lesions suspicious for metastatic disease. He was then told to get a follow up MRI on 8/15 which showed hepatosplenomegaly with diffuse metastatic nodules throughout both lobes along with multiple metastatic bone lesions throughout the pelvis. Earlier 8/29 he was at the IR office getting a liver biopsy for further workup of his cancer and had blood work done demonstrating electrolyte derangements significant for Na 131, K 5.7, Cl 91, AG 16, BUN 47 (up from 28 8/8), Cr 1.95 (up from 1.6 8/8), Ca 11.    Nephrology consulted for worsening GREG. Since admission Cr has improved from 1.95 to 1.4-1.5. During admission underwent ERCP. After, SCr increased significantly to 2.17 and now 2.79.    PAST HISTORY  --------------------------------------------------------------------------------  PAST MEDICAL & SURGICAL HISTORY:  BPH (Benign Prostatic Hyperplasia)      HLD (hyperlipidemia)      HTN (hypertension)      Nephrolithiasis      Enlarged prostate without lower urinary tract symptoms (luts)      Bladder calculi      Weight loss      HTN (hypertension)  no meds at present      Unilateral inguinal hernia, without obstruction or gangrene, not specified as recurrent      S/P tonsillectomy      Nephrolithiasis  s/p litho 2004      S/P herniorrhaphy  right side with mesh Sept 2020      BPH (Benign Prostatic Hyperplasia)  s/p biopsy 2010 - benign      History of prostate surgery  [facility] 2020        FAMILY HISTORY:  Family history of colon cancer in mother (Mother)      PAST SOCIAL HISTORY:    ALLERGIES & MEDICATIONS  --------------------------------------------------------------------------------  Allergies    Nuts (Angioedema)  No Known Drug Allergies  Iodine (Angioedema)  shellfish (Angioedema)    Intolerances      Standing Inpatient Medications  allopurinol 100 milliGRAM(s) Oral daily  FIRST- Mouthwash  BLM 10 milliLiter(s) Swish and Swallow four times a day  fluconAZOLE   Tablet 100 milliGRAM(s) Oral every 24 hours  heparin   Injectable 5000 Unit(s) SubCutaneous every 12 hours  lactated ringers. 1000 milliLiter(s) IV Continuous <Continuous>  midodrine 5 milliGRAM(s) Oral every 8 hours  mirtazapine 15 milliGRAM(s) Oral at bedtime  sodium zirconium cyclosilicate 10 Gram(s) Oral every 8 hours    PRN Inpatient Medications  acetaminophen     Tablet .. 650 milliGRAM(s) Oral every 6 hours PRN  HYDROmorphone   Tablet 2 milliGRAM(s) Oral every 6 hours PRN  polyethylene glycol 3350 17 Gram(s) Oral daily PRN  senna 2 Tablet(s) Oral at bedtime PRN      REVIEW OF SYSTEMS  --------------------------------------------------------------------------------  Gen: No fevers/chills  Skin: No rashes  Head/Eyes/Ears: Normal hearing,   Respiratory: No dyspnea, cough  CV: No chest pain  GI: No abdominal pain, diarrhea  : No dysuria, hematuria  MSK: No  edema  Heme: No easy bruising or bleeding  Psych: No significant depression    All other systems were reviewed and are negative, except as noted.    VITALS/PHYSICAL EXAM  --------------------------------------------------------------------------------  T(C): 36.4 (09-08-23 @ 05:01), Max: 36.5 (09-07-23 @ 20:37)  HR: 78 (09-08-23 @ 05:01) (54 - 93)  BP: 105/55 (09-08-23 @ 05:01) (89/62 - 105/55)  RR: 17 (09-08-23 @ 05:01) (16 - 17)  SpO2: 95% (09-08-23 @ 05:01) (95% - 98%)  Wt(kg): --  Height (cm): 182.9 (09-06-23 @ 14:34)  Weight (kg): 48 (09-06-23 @ 14:34)  BMI (kg/m2): 14.3 (09-06-23 @ 14:34)  BSA (m2): 1.63 (09-06-23 @ 14:34)      09-07-23 @ 07:01  -  09-08-23 @ 07:00  --------------------------------------------------------  IN: 0 mL / OUT: 100 mL / NET: -100 mL      Physical Exam:  	Gen: NAD  	HEENT: MMM  	Pulm: CTA B/L  	CV: S1S2  	Abd: Soft, +BS   	Ext: No LE edema B/L  	Neuro: Awake  	Skin: Warm and dry  	Vascular access:    LABS/STUDIES  --------------------------------------------------------------------------------              9.8    8.32  >-----------<  210      [09-08-23 @ 04:49]              29.3     135  |  98  |  65  ----------------------------<  89      [09-08-23 @ 04:49]  4.6   |  24  |  2.79        Ca     9.3     [09-08-23 @ 04:49]      Mg     2.20     [09-08-23 @ 04:49]      Phos  2.8     [09-08-23 @ 04:49]    TPro  5.9  /  Alb  2.9  /  TBili  5.6  /  DBili  x   /  AST  416  /  ALT  99  /  AlkPhos  622  [09-08-23 @ 04:49]        Uric acid 10.4      [09-08-23 @ 04:49]    Creatinine Trend:  SCr 2.79 [09-08 @ 04:49]  SCr 2.17 [09-07 @ 05:20]  SCr 1.57 [09-06 @ 03:10]  SCr 1.53 [09-05 @ 07:11]  SCr 1.54 [09-04 @ 06:02]    Urinalysis - [09-08-23 @ 04:49]      Color  / Appearance  / SG  / pH       Gluc 89 / Ketone   / Bili  / Urobili        Blood  / Protein  / Leuk Est  / Nitrite       RBC  / WBC  / Hyaline  / Gran  / Sq Epi  / Non Sq Epi  / Bacteria     Urine Creatinine 207      [09-07-23 @ 11:08]  Urine Protein 78      [09-05-23 @ 12:33]  Urine Sodium 29      [09-07-23 @ 11:08]  Urine Urea Nitrogen 792.0      [09-07-23 @ 11:08]  Urine Potassium 66.8      [09-05-23 @ 12:33]  Urine Osmolality 641      [09-05-23 @ 12:33]      HBsAg Nonreact      [09-01-23 @ 06:43]  HCV 0.08, Nonreact      [09-01-23 @ 06:43]     [facility] DIVISION OF KIDNEY DISEASES AND HYPERTENSION -- [phone]  -- INITIAL CONSULT NOTE  --------------------------------------------------------------------------------  HPI:    Patient is an 83M with PMHx of HTN, BPH, arthritis, and liver lesions presenting w/ hyperkalemia and GREG seen on outpatient lab work completed during scheduled IR liver biopsy earlier tin day    The pt explained ~1 month ago (July 2023), he began experiencing gradual generalized weakness, malaise, abdominal pain, decreased appetite, and an unintentional 10-15 lb weight loss. Pt states that he previously had a good appetite but gets exhausted easily while eating and loses his appetite after only a few bites of food; he has also noticed that with the first bite/first sip he feels a choking sensation, which resolves as  He went to the [facility] ED on 8/8/23 for these symptoms where he had a CT done showing pulmonary nodules and liver lesions suspicious for metastatic disease. He was then told to get a follow up MRI on 8/15 which showed hepatosplenomegaly with diffuse metastatic nodules throughout both lobes along with multiple metastatic bone lesions throughout the pelvis. Earlier 8/29 he was at the IR office getting a liver biopsy for further workup of his cancer and had blood work done demonstrating electrolyte derangements significant for Na 131, K 5.7, Cl 91, AG 16, BUN 47 (up from 28 8/8), Cr 1.95 (up from 1.6 8/8), Ca 11.    Nephrology consulted for worsening GREG. Since admission Cr has improved from 1.95 to 1.4-1.5. During admission underwent ERCP. After, SCr increased significantly to 2.17 and now 2.79.    PAST HISTORY  --------------------------------------------------------------------------------  PAST MEDICAL & SURGICAL HISTORY:  BPH (Benign Prostatic Hyperplasia)      HLD (hyperlipidemia)      HTN (hypertension)      Nephrolithiasis      Enlarged prostate without lower urinary tract symptoms (luts)      Bladder calculi      Weight loss      HTN (hypertension)  no meds at present      Unilateral inguinal hernia, without obstruction or gangrene, not specified as recurrent      S/P tonsillectomy      Nephrolithiasis  s/p litho 2004      S/P herniorrhaphy  right side with mesh Sept 2020      BPH (Benign Prostatic Hyperplasia)  s/p biopsy 2010 - benign      History of prostate surgery  [facility] 2020        FAMILY HISTORY:  Family history of colon cancer in mother (Mother)      PAST SOCIAL HISTORY:    ALLERGIES & MEDICATIONS  --------------------------------------------------------------------------------  Allergies    Nuts (Angioedema)  No Known Drug Allergies  Iodine (Angioedema)  shellfish (Angioedema)    Intolerances      Standing Inpatient Medications  allopurinol 100 milliGRAM(s) Oral daily  FIRST- Mouthwash  BLM 10 milliLiter(s) Swish and Swallow four times a day  fluconAZOLE   Tablet 100 milliGRAM(s) Oral every 24 hours  heparin   Injectable 5000 Unit(s) SubCutaneous every 12 hours  lactated ringers. 1000 milliLiter(s) IV Continuous <Continuous>  midodrine 5 milliGRAM(s) Oral every 8 hours  mirtazapine 15 milliGRAM(s) Oral at bedtime  sodium zirconium cyclosilicate 10 Gram(s) Oral every 8 hours    PRN Inpatient Medications  acetaminophen     Tablet .. 650 milliGRAM(s) Oral every 6 hours PRN  HYDROmorphone   Tablet 2 milliGRAM(s) Oral every 6 hours PRN  polyethylene glycol 3350 17 Gram(s) Oral daily PRN  senna 2 Tablet(s) Oral at bedtime PRN      REVIEW OF SYSTEMS  --------------------------------------------------------------------------------  Gen: No fevers/chills  Skin: No rashes  Head/Eyes/Ears: Normal hearing,   Respiratory: No dyspnea, cough  CV: No chest pain  GI: No abdominal pain, diarrhea  : No dysuria, hematuria  MSK: No  edema  Heme: No easy bruising or bleeding  Psych: No significant depression    All other systems were reviewed and are negative, except as noted.    VITALS/PHYSICAL EXAM  --------------------------------------------------------------------------------  T(C): 36.4 (09-08-23 @ 05:01), Max: 36.5 (09-07-23 @ 20:37)  HR: 78 (09-08-23 @ 05:01) (54 - 93)  BP: 105/55 (09-08-23 @ 05:01) (89/62 - 105/55)  RR: 17 (09-08-23 @ 05:01) (16 - 17)  SpO2: 95% (09-08-23 @ 05:01) (95% - 98%)  Wt(kg): --  Height (cm): 182.9 (09-06-23 @ 14:34)  Weight (kg): 48 (09-06-23 @ 14:34)  BMI (kg/m2): 14.3 (09-06-23 @ 14:34)  BSA (m2): 1.63 (09-06-23 @ 14:34)      09-07-23 @ 07:01  -  09-08-23 @ 07:00  --------------------------------------------------------  IN: 0 mL / OUT: 100 mL / NET: -100 mL      Physical Exam:  	Gen: NAD  	HEENT: MMM  	Pulm: CTA B/L  	CV: S1S2  	Abd:  Soft, Tender to RUQ, with palpable liver, distended superficial veins of his abdomin   	Ext:  1+ Pitting Edema in b/l lower extremities, no erythema, non-tender to palpation.   	Neuro: Awake  	Skin: Warm and dry  	Vascular access:    LABS/STUDIES  --------------------------------------------------------------------------------              9.8    8.32  >-----------<  210      [09-08-23 @ 04:49]              29.3     135  |  98  |  65  ----------------------------<  89      [09-08-23 @ 04:49]  4.6   |  24  |  2.79        Ca     9.3     [09-08-23 @ 04:49]      Mg     2.20     [09-08-23 @ 04:49]      Phos  2.8     [09-08-23 @ 04:49]    TPro  5.9  /  Alb  2.9  /  TBili  5.6  /  DBili  x   /  AST  416  /  ALT  99  /  AlkPhos  622  [09-08-23 @ 04:49]        Uric acid 10.4      [09-08-23 @ 04:49]    Creatinine Trend:  SCr 2.79 [09-08 @ 04:49]  SCr 2.17 [09-07 @ 05:20]  SCr 1.57 [09-06 @ 03:10]  SCr 1.53 [09-05 @ 07:11]  SCr 1.54 [09-04 @ 06:02]    Urinalysis - [09-08-23 @ 04:49]      Color  / Appearance  / SG  / pH       Gluc 89 / Ketone   / Bili  / Urobili        Blood  / Protein  / Leuk Est  / Nitrite       RBC  / WBC  / Hyaline  / Gran  / Sq Epi  / Non Sq Epi  / Bacteria     Urine Creatinine 207      [09-07-23 @ 11:08]  Urine Protein 78      [09-05-23 @ 12:33]  Urine Sodium 29      [09-07-23 @ 11:08]  Urine Urea Nitrogen 792.0      [09-07-23 @ 11:08]  Urine Potassium 66.8      [09-05-23 @ 12:33]  Urine Osmolality 641      [09-05-23 @ 12:33]      HBsAg Nonreact      [09-01-23 @ 06:43]  HCV 0.08, Nonreact      [09-01-23 @ 06:43]     [facility] DIVISION OF KIDNEY DISEASES AND HYPERTENSION -- [phone]  -- INITIAL CONSULT NOTE  --------------------------------------------------------------------------------  HPI:    Patient is an 83M with PMHx of HTN, BPH, arthritis, and liver lesions presenting w/ hyperkalemia and GREG seen on outpatient lab work completed during scheduled IR liver biopsy earlier tin day    The pt explained ~1 month ago (July 2023), he began experiencing gradual generalized weakness, malaise, abdominal pain, decreased appetite, and an unintentional 10-15 lb weight loss. Pt states that he previously had a good appetite but gets exhausted easily while eating and loses his appetite after only a few bites of food; he has also noticed that with the first bite/first sip he feels a choking sensation, which resolves as  He went to the [facility] ED on 8/8/23 for these symptoms where he had a CT done showing pulmonary nodules and liver lesions suspicious for metastatic disease. He was then told to get a follow up MRI on 8/15 which showed hepatosplenomegaly with diffuse metastatic nodules throughout both lobes along with multiple metastatic bone lesions throughout the pelvis. Earlier 8/29 he was at the IR office getting a liver biopsy for further workup of his cancer and had blood work done demonstrating electrolyte derangements significant for Na 131, K 5.7, Cl 91, AG 16, BUN 47 (up from 28 8/8), Cr 1.95 (up from 1.6 8/8), Ca 11.    Nephrology consulted for worsening GREG. Since admission Cr has improved from 1.95 to 1.4-1.5. During admission underwent ERCP. After, SCr increased significantly to 2.17 and now 2.79.    PAST HISTORY  --------------------------------------------------------------------------------  PAST MEDICAL & SURGICAL HISTORY:  BPH (Benign Prostatic Hyperplasia)      HLD (hyperlipidemia)      HTN (hypertension)      Nephrolithiasis      Enlarged prostate without lower urinary tract symptoms (luts)      Bladder calculi      Weight loss      HTN (hypertension)  no meds at present      Unilateral inguinal hernia, without obstruction or gangrene, not specified as recurrent      S/P tonsillectomy      Nephrolithiasis  s/p litho 2004      S/P herniorrhaphy  right side with mesh Sept 2020      BPH (Benign Prostatic Hyperplasia)  s/p biopsy 2010 - benign      History of prostate surgery  [facility] 2020        FAMILY HISTORY:  Family history of colon cancer in mother (Mother)      PAST SOCIAL HISTORY: No smoking, drugs or alcohol use.    ALLERGIES & MEDICATIONS  --------------------------------------------------------------------------------  Allergies    Nuts (Angioedema)  No Known Drug Allergies  Iodine (Angioedema)  shellfish (Angioedema)    Intolerances      Standing Inpatient Medications  allopurinol 100 milliGRAM(s) Oral daily  FIRST- Mouthwash  BLM 10 milliLiter(s) Swish and Swallow four times a day  fluconAZOLE   Tablet 100 milliGRAM(s) Oral every 24 hours  heparin   Injectable 5000 Unit(s) SubCutaneous every 12 hours  lactated ringers. 1000 milliLiter(s) IV Continuous <Continuous>  midodrine 5 milliGRAM(s) Oral every 8 hours  mirtazapine 15 milliGRAM(s) Oral at bedtime  sodium zirconium cyclosilicate 10 Gram(s) Oral every 8 hours    PRN Inpatient Medications  acetaminophen     Tablet .. 650 milliGRAM(s) Oral every 6 hours PRN  HYDROmorphone   Tablet 2 milliGRAM(s) Oral every 6 hours PRN  polyethylene glycol 3350 17 Gram(s) Oral daily PRN  senna 2 Tablet(s) Oral at bedtime PRN      REVIEW OF SYSTEMS  --------------------------------------------------------------------------------  Gen: No fevers/chills  Skin: No rashes  Head/Eyes/Ears: Normal hearing,   Respiratory: No dyspnea, cough  CV: No chest pain  GI: No abdominal pain, diarrhea  : No dysuria, hematuria  MSK: No  edema  Heme: No easy bruising or bleeding  Psych: No significant depression    All other systems were reviewed and are negative, except as noted.    VITALS/PHYSICAL EXAM  --------------------------------------------------------------------------------  T(C): 36.4 (09-08-23 @ 05:01), Max: 36.5 (09-07-23 @ 20:37)  HR: 78 (09-08-23 @ 05:01) (54 - 93)  BP: 105/55 (09-08-23 @ 05:01) (89/62 - 105/55)  RR: 17 (09-08-23 @ 05:01) (16 - 17)  SpO2: 95% (09-08-23 @ 05:01) (95% - 98%)  Wt(kg): --  Height (cm): 182.9 (09-06-23 @ 14:34)  Weight (kg): 48 (09-06-23 @ 14:34)  BMI (kg/m2): 14.3 (09-06-23 @ 14:34)  BSA (m2): 1.63 (09-06-23 @ 14:34)      09-07-23 @ 07:01  -  09-08-23 @ 07:00  --------------------------------------------------------  IN: 0 mL / OUT: 100 mL / NET: -100 mL      Physical Exam:  	Gen: NAD  	HEENT: MMM  	Pulm: CTA B/L  	CV: S1S2  	Abd:  Soft, Tender to RUQ, with palpable liver, distended superficial veins of his abdomin   	Ext:  1+ Pitting Edema in b/l lower extremities, no erythema, non-tender to palpation.   	Neuro: Awake  	Skin: Warm and dry  	Vascular access:    LABS/STUDIES  --------------------------------------------------------------------------------              9.8    8.32  >-----------<  210      [09-08-23 @ 04:49]              29.3     135  |  98  |  65  ----------------------------<  89      [09-08-23 @ 04:49]  4.6   |  24  |  2.79        Ca     9.3     [09-08-23 @ 04:49]      Mg     2.20     [09-08-23 @ 04:49]      Phos  2.8     [09-08-23 @ 04:49]    TPro  5.9  /  Alb  2.9  /  TBili  5.6  /  DBili  x   /  AST  416  /  ALT  99  /  AlkPhos  622  [09-08-23 @ 04:49]        Uric acid 10.4      [09-08-23 @ 04:49]    Creatinine Trend:  SCr 2.79 [09-08 @ 04:49]  SCr 2.17 [09-07 @ 05:20]  SCr 1.57 [09-06 @ 03:10]  SCr 1.53 [09-05 @ 07:11]  SCr 1.54 [09-04 @ 06:02]    Urinalysis - [09-08-23 @ 04:49]      Color  / Appearance  / SG  / pH       Gluc 89 / Ketone   / Bili  / Urobili        Blood  / Protein  / Leuk Est  / Nitrite       RBC  / WBC  / Hyaline  / Gran  / Sq Epi  / Non Sq Epi  / Bacteria     Urine Creatinine 207      [09-07-23 @ 11:08]  Urine Protein 78      [09-05-23 @ 12:33]  Urine Sodium 29      [09-07-23 @ 11:08]  Urine Urea Nitrogen 792.0      [09-07-23 @ 11:08]  Urine Potassium 66.8      [09-05-23 @ 12:33]  Urine Osmolality 641      [09-05-23 @ 12:33]      HBsAg Nonreact      [09-01-23 @ 06:43]  HCV 0.08, Nonreact      [09-01-23 @ 06:43]

## 2023-09-08 NOTE — PROVIDER CONTACT NOTE (OTHER) - ASSESSMENT
Patient is A*O times 3. No Acute stress noted. Patient denied headache or dizziness. VS: BP:92/51, HR: 54, RR:16, SpO2: 97, and Temp: 97.3. Patient is on IV fluids.

## 2023-09-08 NOTE — PROGRESS NOTE ADULT - ASSESSMENT
Pt is an 83 y.o. M w/ PMHx HTN, BPH, recent findings of metastatic cancer to lung, liver, and pelvic bone w/ unclear primary source at this point, present with severe generalized weakness ISO electrolyte derangements including hyponatremia and hyperkalemia along w/ GREG, most likely due to pre-renal cause secondary to poor po intake. 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 s/p ERCP with biliary stent on 9/6 (downtrending bili). Currently with poor nutritions and worsening of GREG.   Patient continues to have fatigue, poor appetite without n/v. Onc inpatient w/o any further workup. Outpatient appointment with oncology.

## 2023-09-08 NOTE — PROGRESS NOTE ADULT - PROBLEM SELECTOR PLAN 1
- ISO newly found lesions (presumably cancer) to liver and pelvic bones, likely lungs   - Severe generalized weakness, pt was scared to go home following IR biopsy 8/29, sent straight to [facility]. Minimal appetite, fatigue, unintentional weight loss. Denies dysphagia or odynophagia but difficulty initiating swallow and feels like food gets "stuck at the top of stomach", leading him to feel full easily and eat minimally. Says that intermittent abd pain is UNRELATED to food; avoiding food more so because of his early satiety.  - Speech + swallow eval with cineesophagram - recommending minced and moist with mildly thick liquids 2/2 silent aspiration. Pt strongly requesting regular diet despite this rec due to poor taste/dislike for M+M diet, expresses understanding of risks but emphasizes priority is increasing caloric intake.  - Considered barium esophagram + small bowel series however was deemed poor option 2/2 aspiration and pt's inability to drink large volumes of liquid at once  - Discussion w/ family  NGT carries risk of aspiration, would be poor choice for pt at this time and he cannot go home with it; d/c is goal so onc workup can be continued.   - Esophageal Plaques found on ERCP 9/6 - Started on empiric Fluconazole / Magic Mouth wash   - PT eval --> rec home PT.

## 2023-09-08 NOTE — PROGRESS NOTE ADULT - PROBLEM SELECTOR PLAN 7
Diet: Recommendation was minced and moist with mildly thickened liquids + Ensure, however today 9/3 pt strongly requesting regular diet due to taste/texture preference and goal to attempt to gain weight and increase caloric intake. Risks discussed, pt expressed understanding but still prefers regular diet.    DVT ppx: SubQ heparin   Code Status: Extensive GOC discussion with pt at bedside. Says he has a lot to live for including family, grandkids and wants "everything done to keep him alive" including CPR, intubation, and pressors. Palliative made aware of pt but not formally consulted at this time. Likely will be more useful once primary diagnosis established and prognosis is more clear; pt's pain is intermittent well managed at this time   Dispo: d/c home with PT and potentially additional assistance to continue onc workup and management Diet: Recommendation was minced and moist with mildly thickened liquids + Ensure, however today 9/3 pt strongly requesting regular diet due to taste/texture preference and goal to attempt to gain weight and increase caloric intake. Risks discussed, pt expressed understanding but still prefers regular diet.    DVT ppx: SubQ heparin   Code Status: Extensive GOC discussion with pt at bedside. Says he has a lot to live for including family, grandkids and wants "everything done to keep him alive" including CPR, intubation, and pressors. Palliative made aware of pt but not formally consulted at this time. Likely will be more useful once primary diagnosis established and prognosis is more clear; pt's pain is intermittent well managed at this time   Dispo: d/c home with PT and potentially additional assistance to continue onc workup and management. Considering home hospice. Diet: Recommendation was minced and moist with mildly thickened liquids + Ensure, however today 9/3 pt strongly requesting regular diet due to taste/texture preference and goal to attempt to gain weight and increase caloric intake. Risks discussed, pt expressed understanding but still prefers regular diet.    DVT ppx: SubQ heparin   Code Status: Extensive GOC discussion with pt at bedside. Says he has a lot to live for including family, grandkids and wants "everything done to keep him alive" including CPR, intubation, and pressors. Palliative made aware of pt but not formally consulted at this time. Likely will be more useful once primary diagnosis established and prognosis is more clear; pt's pain is intermittent well managed at this time   Dispo: D/c to [facility] in [place] - with hospice. Patient does not desire additional medical management of metastatic cancer.

## 2023-09-08 NOTE — PROVIDER CONTACT NOTE (OTHER) - ASSESSMENT
Patient is A*O times 3. No Acute stress noted. Patient denied headache or dizziness. VS: BP: 89/62, HR: 54, RR:17, SpO2: 98, and Temp: 97.7. Patient is on IV fluids.

## 2023-09-08 NOTE — PROGRESS NOTE ADULT - PROBLEM SELECTOR PLAN 4
- Uric acid 12.9 likely ISO metastatic cancer  - Started allopurinol 100mg (renally dosed) daily  - IVF - for renal protection if tolerated, pt's family also requested IVF bc they feel he is not adequately hydrating by mouth

## 2023-09-08 NOTE — PROGRESS NOTE ADULT - ATTENDING COMMENTS
82 yo gentlemen w/ PMHx HTN, BPH, recent findings of metastatic cancer to lung, liver, and pelvic bone w/ unclear primary source at this point, present with severe generalized weakness c/b electrolyte derangements including hyponatremia and hyperkalemia along w/ GREG, most likely due to pre-renal cause secondary to poor po intake.    #hyperbillrubinemia. S/P ERCP, bili improving. Patient with improved appetite.  Path +poorly differentiated neuroendocrine tumor- hematology/oncology evaluated, patient electing hospice, DNR, discussed with patient and his son at bedside. Total time discussing GOC/advance directives ~36 mins. Hospice referral to be made.   Worsening Cr today, will rehydrate, monitor Cr  LE edema- unclear etiology, possibly related to worsening hepatomegaly. LE U/s obtained, no dvt noted. Abdominal u/s showing hepatomegaly.   Continue the rest of the work up and management as stated above.

## 2023-09-08 NOTE — PROGRESS NOTE ADULT - ATTENDING COMMENTS
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 Extensive discussion with patient and family at bedside about risk and benefits of treating in the context of his deconditioning. He has an aggressive malignancy that has spread throughout his liver; the cancer is relatively chemo sensitive but his constitution is poor, providing chemotherapy may harm his tumor and also harm him and difficult to predict whether he would derive net benefit.     He discussed this with his family and Dr Sol and made the reasonable decision to pursue hospice care.    Marcus Gray MD PhD  Oncology Attending

## 2023-09-08 NOTE — CONSULT NOTE ADULT - CONSULT REASON
CT showing hepatomegaly with presumed innumerable hepatic metastatic lesions.
elevated bilirubin
worsening GREG

## 2023-09-08 NOTE — PROGRESS NOTE ADULT - PROBLEM SELECTOR PLAN 2
- Known liver mets (vs primary tumor?) s/p biopsy with IR outpatient 8/29. Will f/u results - onc will email to expedite results   - CTAP on admission w/ hepatomegaly with presumed innumerable hepatic metastatic lesions, trace ascites, mild anasarca  - TBili continuing to increase at an increasing rate, 5.5 on 9/5 up from 1.0 at beginning of August.   - Oncology consulted: Elevated CEA (164), d-dimer (5418), LDH (621). Normal PSA, CA9-19, AFP tumor marker, Hepatitis non-reactive.  - Concern for possible biliary obstruction 2/2 rapidly elevated TBili and AlkPhos compared to earlier this month; MRCP w/ slight compression of CBD by liver. Pt w/o Sx of hyperbilirubinemia.   - ERCP with stent placement on 9/6  - IR Biopsy - Undifferentiated Neuroendocrine tumor   - 9/8 - Patient and family d/w with oncology about possibility of inpatient chemo. Currently deciding on weather to pursue treatment   - Pt has initial consultation with Dr. Milagros Sol at [facility] Thurs. 9/7 at 11:30Am - Known liver mets (vs primary tumor?) s/p biopsy with IR outpatient 8/29. Will f/u results - onc will email to expedite results   - CTAP on admission w/ hepatomegaly with presumed innumerable hepatic metastatic lesions, trace ascites, mild anasarca  - TBili continuing to increase at an increasing rate, 5.5 on 9/5 up from 1.0 at beginning of August.   - Oncology consulted: Elevated CEA (164), d-dimer (5418), LDH (621). Normal PSA, CA9-19, AFP tumor marker, Hepatitis non-reactive.  - Concern for possible biliary obstruction 2/2 rapidly elevated TBili and AlkPhos compared to earlier this month; MRCP w/ slight compression of CBD by liver. Pt w/o Sx of hyperbilirubinemia.   - ERCP with stent placement on 9/6  - IR Biopsy - Undifferentiated Neuroendocrine tumor   - 9/8 - After d/w with oncology, patient would like to defer inpatient chemo.   - Pt has initial consultation with Dr. Milagros Sol at [facility] Thurs. 9/7 at 11:30Am - Known liver mets (vs primary tumor?) s/p biopsy with IR outpatient 8/29. Will f/u results - onc will email to expedite results   - CTAP on admission w/ hepatomegaly with presumed innumerable hepatic metastatic lesions, trace ascites, mild anasarca  - TBili continuing to increase at an increasing rate, 5.5 on 9/5 up from 1.0 at beginning of August.   - Oncology consulted: Elevated CEA (164), d-dimer (5418), LDH (621). Normal PSA, CA9-19, AFP tumor marker, Hepatitis non-reactive.  - Concern for possible biliary obstruction 2/2 rapidly elevated TBili and AlkPhos compared to earlier this month; MRCP w/ slight compression of CBD by liver. Pt w/o Sx of hyperbilirubinemia.   - ERCP with stent placement on 9/6  - IR Biopsy - Undifferentiated Neuroendocrine tumor - Pt has initial consultation with Dr. Milagros Sol at [facility] Thurs. 9/7 at 11:30Am  - 9/8 - After d/w with oncology, patient would like to defer inpatient chemo.   As of 9/8 - Pt and family does not want additional medical treatment for metastatic cancer.

## 2023-09-08 NOTE — CONSULT NOTE ADULT - ATTENDING COMMENTS
82 y/o man with radiographic suspicion of a metastatic malignancy s/p biopsy of liver 8/29. Appearance consistent with metastasis, to liver, possibly to lung as well. Primary may be anywhere, statistically prostate, or GI source; he had a bladder cancer in the past as well. Tumor markers collected may give a hint but ultimately biopsy will be informative. Will comment on management when we have a better sense of the primary. He is deconditioned which may limit treatment options. He may have a biliary obstruction, which I hope can be reversed mechanically, or that also may limit treatment options. Would keep in-house to w/u a suspected biliary obstruction.  Marcus Gray MD PhD  Oncology Attending
Patient seen and examined with the GI fellow. I agree with the above assessment and plan. Thank you for allowing us to care for your patient.    Awaiting MRCP to evaluate if the increased Tbili  is new biliary obstruction.
GREG  ATN  Hypotension    Would cont midodrine to mainitain BP support in light of medication use which can further lower BP  Patient likely with ATN given presentation, would continue supportive measures to maintain a MAP > 60  Avoid nephrotoxic agents  Will monitor labs and Is/Os

## 2023-09-08 NOTE — PROGRESS NOTE ADULT - SUBJECTIVE AND OBJECTIVE BOX
***************************************************************  Marco A Melo  (PGY1) Internal Medicine  On TEAMS  ***************************************************************    PROGRESS NOTE:     Patient is a 83y old  Male who presents with a chief complaint of Hyperkalemia, GREG (07 Sep 2023 16:44)      SUBJECTIVE / OVERNIGHT EVENTS:      MEDICATIONS  (STANDING):  allopurinol 100 milliGRAM(s) Oral daily  FIRST- Mouthwash  BLM 10 milliLiter(s) Swish and Swallow four times a day  fluconAZOLE   Tablet 100 milliGRAM(s) Oral every 24 hours  heparin   Injectable 5000 Unit(s) SubCutaneous every 12 hours  lactated ringers. 1000 milliLiter(s) (50 mL/Hr) IV Continuous <Continuous>  mirtazapine 15 milliGRAM(s) Oral at bedtime  sodium zirconium cyclosilicate 10 Gram(s) Oral every 8 hours    MEDICATIONS  (PRN):  acetaminophen     Tablet .. 650 milliGRAM(s) Oral every 6 hours PRN Moderate Pain (4 - 6)  HYDROmorphone   Tablet 2 milliGRAM(s) Oral every 6 hours PRN Severe Pain (7 - 10)  polyethylene glycol 3350 17 Gram(s) Oral daily PRN Constipation  senna 2 Tablet(s) Oral at bedtime PRN Constipation      CAPILLARY BLOOD GLUCOSE        I&O's Summary    07 Sep 2023 07:01  -  08 Sep 2023 06:47  --------------------------------------------------------  IN: 0 mL / OUT: 100 mL / NET: -100 mL        PHYSICAL EXAM:  Vital Signs Last 24 Hrs  T(C): 36.4 (08 Sep 2023 05:01), Max: 36.5 (07 Sep 2023 20:37)  T(F): 97.5 (08 Sep 2023 05:01), Max: 97.7 (07 Sep 2023 20:37)  HR: 78 (08 Sep 2023 05:01) (54 - 93)  BP: 105/55 (08 Sep 2023 05:01) (89/62 - 105/55)  BP(mean): --  RR: 17 (08 Sep 2023 05:01) (16 - 17)  SpO2: 95% (08 Sep 2023 05:01) (95% - 98%)    Parameters below as of 08 Sep 2023 05:01  Patient On (Oxygen Delivery Method): room air        General : NAD  CV: RRR no R/M/G  Lungs: CTAB  Abd : Soft, non-tender, non-distended  Extremities : No LE Edema  Neuro : A&Ox3    LABS:                        9.8    8.32  )-----------( 210      ( 08 Sep 2023 04:49 )             29.3     09-08    135  |  98  |  65<H>  ----------------------------<  89  4.6   |  24  |  2.79<H>    Ca    9.3      08 Sep 2023 04:49  Phos  2.8     09-08  Mg     2.20     09-08    TPro  5.9<L>  /  Alb  2.9<L>  /  TBili  5.6<H>  /  DBili  x   /  AST  416<H>  /  ALT  99<H>  /  AlkPhos  622<H>  09-08          Urinalysis Basic - ( 08 Sep 2023 04:49 )    Color: x / Appearance: x / SG: x / pH: x  Gluc: 89 mg/dL / Ketone: x  / Bili: x / Urobili: x   Blood: x / Protein: x / Nitrite: x   Leuk Esterase: x / RBC: x / WBC x   Sq Epi: x / Non Sq Epi: x / Bacteria: x          Medications (Standing)  MEDICATIONS  (STANDING):  allopurinol 100 milliGRAM(s) Oral daily  FIRST- Mouthwash  BLM 10 milliLiter(s) Swish and Swallow four times a day  fluconAZOLE   Tablet 100 milliGRAM(s) Oral every 24 hours  heparin   Injectable 5000 Unit(s) SubCutaneous every 12 hours  lactated ringers. 1000 milliLiter(s) (50 mL/Hr) IV Continuous <Continuous>  mirtazapine 15 milliGRAM(s) Oral at bedtime  sodium zirconium cyclosilicate 10 Gram(s) Oral every 8 hours        COORDINATION OF CARE:  Care Discussed with Consultants/Other Providers [Y/N]:  Prior or Outpatient Records Reviewed [Y/N]:   ***************************************************************  Marco A Melo  (PGY1) Internal Medicine  On TEAMS  ***************************************************************    PROGRESS NOTE:     Patient is a 83y old  Male who presents with a chief complaint of Hyperkalemia, GREG (07 Sep 2023 16:44)      SUBJECTIVE / OVERNIGHT EVENTS:  Patient seen at the bedside this morning. Slept well with improving appetite, although still not eating much  2/2 early satiet and dysphagia. Yesterday patient ate potatos and beans.   BM yesterday, ~150cc of dark urine in the collection container. Denies fevers, chills, SOB, pain in his extremities.   Pt had a d/w with hemetology / oncology and would like to pursue hospice care over inpatient chemotherapy as patient feels like his body is too weak.       MEDICATIONS  (STANDING):  allopurinol 100 milliGRAM(s) Oral daily  FIRST- Mouthwash  BLM 10 milliLiter(s) Swish and Swallow four times a day  fluconAZOLE   Tablet 100 milliGRAM(s) Oral every 24 hours  heparin   Injectable 5000 Unit(s) SubCutaneous every 12 hours  lactated ringers. 1000 milliLiter(s) (50 mL/Hr) IV Continuous <Continuous>  mirtazapine 15 milliGRAM(s) Oral at bedtime  sodium zirconium cyclosilicate 10 Gram(s) Oral every 8 hours    MEDICATIONS  (PRN):  acetaminophen     Tablet .. 650 milliGRAM(s) Oral every 6 hours PRN Moderate Pain (4 - 6)  HYDROmorphone   Tablet 2 milliGRAM(s) Oral every 6 hours PRN Severe Pain (7 - 10)  polyethylene glycol 3350 17 Gram(s) Oral daily PRN Constipation  senna 2 Tablet(s) Oral at bedtime PRN Constipation      CAPILLARY BLOOD GLUCOSE        I&O's Summary    07 Sep 2023 07:01  -  08 Sep 2023 06:47  --------------------------------------------------------  IN: 0 mL / OUT: 100 mL / NET: -100 mL        PHYSICAL EXAM:  Vital Signs Last 24 Hrs  T(C): 36.4 (08 Sep 2023 05:01), Max: 36.5 (07 Sep 2023 20:37)  T(F): 97.5 (08 Sep 2023 05:01), Max: 97.7 (07 Sep 2023 20:37)  HR: 78 (08 Sep 2023 05:01) (54 - 93)  BP: 105/55 (08 Sep 2023 05:01) (89/62 - 105/55)  BP(mean): --  RR: 17 (08 Sep 2023 05:01) (16 - 17)  SpO2: 95% (08 Sep 2023 05:01) (95% - 98%)    Parameters below as of 08 Sep 2023 05:01  Patient On (Oxygen Delivery Method): room air    General : NAD  CV: S1/S2   Lungs: CTAB  Abd : Soft, Tender to RUQ, with palpable liver, distended superficial veins of his abdomin   Extremities : 2+ Pitting Edema in b/l lower extremities, no erythema, non-tender to palpation.   Neuro : A&Ox3    LABS:                        9.8    8.32  )-----------( 210      ( 08 Sep 2023 04:49 )             29.3     09-08    135  |  98  |  65<H>  ----------------------------<  89  4.6   |  24  |  2.79<H>    Ca    9.3      08 Sep 2023 04:49  Phos  2.8     09-08  Mg     2.20     09-08    TPro  5.9<L>  /  Alb  2.9<L>  /  TBili  5.6<H>  /  DBili  x   /  AST  416<H>  /  ALT  99<H>  /  AlkPhos  622<H>  09-08          Urinalysis Basic - ( 08 Sep 2023 04:49 )    Color: x / Appearance: x / SG: x / pH: x  Gluc: 89 mg/dL / Ketone: x  / Bili: x / Urobili: x   Blood: x / Protein: x / Nitrite: x   Leuk Esterase: x / RBC: x / WBC x   Sq Epi: x / Non Sq Epi: x / Bacteria: x    Medications (Standing)  MEDICATIONS  (STANDING):  allopurinol 100 milliGRAM(s) Oral daily  FIRST- Mouthwash  BLM 10 milliLiter(s) Swish and Swallow four times a day  fluconAZOLE   Tablet 100 milliGRAM(s) Oral every 24 hours  heparin   Injectable 5000 Unit(s) SubCutaneous every 12 hours  lactated ringers. 1000 milliLiter(s) (50 mL/Hr) IV Continuous <Continuous>  mirtazapine 15 milliGRAM(s) Oral at bedtime  sodium zirconium cyclosilicate 10 Gram(s) Oral every 8 hours        COORDINATION OF CARE:  Care Discussed with Consultants/Other Providers [Y/N]:  Prior or Outpatient Records Reviewed [Y/N]:

## 2023-09-08 NOTE — PROGRESS NOTE ADULT - SUBJECTIVE AND OBJECTIVE BOX
INTERVAL HPI/OVERNIGHT EVENTS:  Patient S&E at bedside. No acute o/n events, pt decides to pursue comfort care/hospice.     VITAL SIGNS:  T(F): 97.2 (09-08-23 @ 15:02)  HR: 63 (09-08-23 @ 15:02)  BP: 113/63 (09-08-23 @ 15:02)  RR: 17 (09-08-23 @ 15:02)  SpO2: 97% (09-08-23 @ 15:02)  Wt(kg): --    PHYSICAL EXAM:    Constitutional: NAD; Cachectic   Eyes: EOMI,  icteric  Neck: supple  Respiratory: , no wheezes  Cardiovascular: RRR  Gastrointestinal: soft, NTND, + BS  Extremities: no cyanosis, clubbing or edema   Neurological: awake and alert      MEDICATIONS  (STANDING):  albumin human  5% IVPB 250 milliLiter(s) IV Intermittent every 8 hours  allopurinol 100 milliGRAM(s) Oral daily  FIRST- Mouthwash  BLM 10 milliLiter(s) Swish and Swallow four times a day  fluconAZOLE   Tablet 100 milliGRAM(s) Oral every 24 hours  heparin   Injectable 5000 Unit(s) SubCutaneous every 12 hours  midodrine 5 milliGRAM(s) Oral every 8 hours  mirtazapine 15 milliGRAM(s) Oral at bedtime  sodium zirconium cyclosilicate 10 Gram(s) Oral every 8 hours    MEDICATIONS  (PRN):  acetaminophen     Tablet .. 650 milliGRAM(s) Oral every 6 hours PRN Moderate Pain (4 - 6)  HYDROmorphone   Tablet 2 milliGRAM(s) Oral every 6 hours PRN Severe Pain (7 - 10)  polyethylene glycol 3350 17 Gram(s) Oral daily PRN Constipation  senna 2 Tablet(s) Oral at bedtime PRN Constipation      Allergies    Nuts (Angioedema)  No Known Drug Allergies  Iodine (Angioedema)  shellfish (Angioedema)    Intolerances        LABS:                        9.8    8.32  )-----------( 210      ( 08 Sep 2023 04:49 )             29.3     09-08    135  |  98  |  65<H>  ----------------------------<  89  4.6   |  24  |  2.79<H>    Ca    9.3      08 Sep 2023 04:49  Phos  2.8     09-08  Mg     2.20     09-08    TPro  5.9<L>  /  Alb  2.9<L>  /  TBili  5.6<H>  /  DBili  x   /  AST  416<H>  /  ALT  99<H>  /  AlkPhos  622<H>  09-08      Urinalysis Basic - ( 08 Sep 2023 04:49 )    Color: x / Appearance: x / SG: x / pH: x  Gluc: 89 mg/dL / Ketone: x  / Bili: x / Urobili: x   Blood: x / Protein: x / Nitrite: x   Leuk Esterase: x / RBC: x / WBC x   Sq Epi: x / Non Sq Epi: x / Bacteria: x        RADIOLOGY & ADDITIONAL TESTS:  Studies reviewed.

## 2023-09-08 NOTE — CONSULT NOTE ADULT - PROBLEM SELECTOR RECOMMENDATION 9
BUN/Cr 46/1.67 on admission likely pre-renal ISO poor po intake. GREG after ERCP on 9/8 BUN/Cr - 54/2.17, Currently (9/8) 65/2.79. Bladder scan negative, FENA <1% c/f pre-renal etiology, on 75cc/h LR. Etiology is multifactorial could be pre-renal in the setting of PO intake/hypotension/liver disease vs intra-renal due to elevated bilirubin causing ATN. Post renal unlikely given no signs of obstruction on Abd US.   -monitor I/Os  -trend Cr  -c/w IVF   -Albumin q8, 3x for today. BUN/Cr 46/1.67 on admission likely pre-renal ISO poor po intake. GREG after ERCP on 9/8 BUN/Cr - 54/2.17, Currently (9/8) 65/2.79. Bladder scan negative, FENA <1% c/f pre-renal etiology, on 75cc/h LR. Etiology is multifactorial could be pre-renal in the setting of PO intake/hypotension/liver disease vs intra-renal due to elevated bilirubin causing ATN. Post renal unlikely given no signs of obstruction on Abd US.   -monitor I/Os  -trend Cr  -Albumin q8, 3x for today.

## 2023-09-08 NOTE — PROGRESS NOTE ADULT - PROBLEM SELECTOR PLAN 3
- BUN/Cr 46/1.67 on admission likely pre-renal ISO poor po intake.   GREG - 9/8 BUN/Cr - 54/2.17  - Bladder scan negative, FENA   Acute change suspcious for pre-renal 2/2 poor intake vs severe venous congestion 2/2 tumor burden.  Nephrology consult as patient for possible Chemo regimen  - Avoid nephrotoxins, renally dose meds - BUN/Cr 46/1.67 on admission likely pre-renal ISO poor po intake.   GREG after ERCP on 9/8 BUN/Cr - 54/2.17, Currently (9/8) 65/2.79.   - Bladder scan negative, FENA <1% c/f pre-renal etiology, on 75cc/h LR.  Acute change suspicious for pre-renal 2/2 poor intake vs severe venous congestion 2/2 tumor burden.  f/u Nephrology consult 9/8  - Avoid nephrotoxins, renally dose meds

## 2023-09-08 NOTE — CONSULT NOTE ADULT - ASSESSMENT
Pt is an 83 y.o. 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 Now with worsening GREG

## 2023-09-08 NOTE — PROGRESS NOTE ADULT - ASSESSMENT
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),  bladder calculus s/p cystoscopic radha lithotripsy on 8/31/20; and newly findings of suspected liver metastatic lesions on  image who presents w/ hyperkalemia and GREG seen on outpatient lab work during scheduled IR office visit (for liver biopsy) earlier 8/29/23.    #Suspected metastatic liver lesions   -around July 2023, he had worsening generalized weakness, malaise, abdominal pain, decreased appetite, and an unintentional >20 lb weight loss.   -went to the [facility] ED on 8/8/23 for these symptoms; CT chest/abd/p without contrast on 8/8/23 showing few small, less than 6 mm left pulmonary nodules. Diffuse heterogeneous low attenuation nodularity throughout the liver is suspicious for metastatic disease. Prostatomegaly. No bowel obstruction. degenerative bone changes.  -MRI on 8/15/23 which showed hepatosplenomegaly with diffuse metastatic nodules throughout both lobes along with multiple metastatic bone lesions throughout the pelvis (image and report available on [facility] PACS).   -After this admission, 8/30 morning lab showing Na 129; Cl 93; K 5.1 Cr 1.81; [id number]; ; Total cynthia 3.4; Calcium 10.5; Lipase 83. Hb 12.4; WBC/PLT WNL   -CT abd/pelvis without contrast on 8/29 showing No hydronephrosis or stone. Redemonstration of hepatomegaly with presumed innumerable hepatic metastatic lesions. Trace ascites and mild anasarca. no lytic bone lesions seen;  degenerative bone changes.   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 Sol at [facility] on 9/7/23: initially considering C1 carboplatin AUC 4+etoposide (flat dose 50mg) as inpatient chemo based on pt current conditions. During the encounter today 9/7, onc team discussed with pt and his family including wife, two sons at the bedside about chemo therapy and most common side effects. pt decides to pursue comfort care/hospice, and no plan for further chemo inpt and outpt.    -the rest of care per primary team      Note is not finalized until signed by attending   Radha Gambino PGY6   hem & onc fellow   [phone] or Team

## 2023-09-09 LAB
ALBUMIN SERPL ELPH-MCNC: 3.7 G/DL — SIGNIFICANT CHANGE UP (ref 3.3–5)
ALP SERPL-CCNC: 651 U/L — HIGH (ref 40–120)
ALT FLD-CCNC: 97 U/L — HIGH (ref 4–41)
ANION GAP SERPL CALC-SCNC: 16 MMOL/L — HIGH (ref 7–14)
AST SERPL-CCNC: 490 U/L — HIGH (ref 4–40)
BASOPHILS # BLD AUTO: 0.04 K/UL — SIGNIFICANT CHANGE UP (ref 0–0.2)
BASOPHILS NFR BLD AUTO: 0.4 % — SIGNIFICANT CHANGE UP (ref 0–2)
BILIRUB SERPL-MCNC: 6.9 MG/DL — HIGH (ref 0.2–1.2)
BUN SERPL-MCNC: 64 MG/DL — HIGH (ref 7–23)
CALCIUM SERPL-MCNC: 10 MG/DL — SIGNIFICANT CHANGE UP (ref 8.4–10.5)
CHLORIDE SERPL-SCNC: 98 MMOL/L — SIGNIFICANT CHANGE UP (ref 98–107)
CO2 SERPL-SCNC: 24 MMOL/L — SIGNIFICANT CHANGE UP (ref 22–31)
CREAT SERPL-MCNC: 2.28 MG/DL — HIGH (ref 0.5–1.3)
EGFR: 28 ML/MIN/1.73M2 — LOW
EOSINOPHIL # BLD AUTO: 0.07 K/UL — SIGNIFICANT CHANGE UP (ref 0–0.5)
EOSINOPHIL NFR BLD AUTO: 0.7 % — SIGNIFICANT CHANGE UP (ref 0–6)
GLUCOSE SERPL-MCNC: 89 MG/DL — SIGNIFICANT CHANGE UP (ref 70–99)
HCT VFR BLD CALC: 32.8 % — LOW (ref 39–50)
HGB BLD-MCNC: 10.8 G/DL — LOW (ref 13–17)
IANC: 7.74 K/UL — HIGH (ref 1.8–7.4)
IMM GRANULOCYTES NFR BLD AUTO: 0.6 % — SIGNIFICANT CHANGE UP (ref 0–0.9)
LYMPHOCYTES # BLD AUTO: 0.91 K/UL — LOW (ref 1–3.3)
LYMPHOCYTES # BLD AUTO: 8.7 % — LOW (ref 13–44)
MAGNESIUM SERPL-MCNC: 2.2 MG/DL — SIGNIFICANT CHANGE UP (ref 1.6–2.6)
MCHC RBC-ENTMCNC: 32 PG — SIGNIFICANT CHANGE UP (ref 27–34)
MCHC RBC-ENTMCNC: 32.9 GM/DL — SIGNIFICANT CHANGE UP (ref 32–36)
MCV RBC AUTO: 97 FL — SIGNIFICANT CHANGE UP (ref 80–100)
MONOCYTES # BLD AUTO: 1.62 K/UL — HIGH (ref 0–0.9)
MONOCYTES NFR BLD AUTO: 15.5 % — HIGH (ref 2–14)
NEUTROPHILS # BLD AUTO: 7.74 K/UL — HIGH (ref 1.8–7.4)
NEUTROPHILS NFR BLD AUTO: 74.1 % — SIGNIFICANT CHANGE UP (ref 43–77)
NRBC # BLD: 0 /100 WBCS — SIGNIFICANT CHANGE UP (ref 0–0)
NRBC # FLD: 0 K/UL — SIGNIFICANT CHANGE UP (ref 0–0)
PHOSPHATE SERPL-MCNC: 2.9 MG/DL — SIGNIFICANT CHANGE UP (ref 2.5–4.5)
PLATELET # BLD AUTO: 240 K/UL — SIGNIFICANT CHANGE UP (ref 150–400)
POTASSIUM SERPL-MCNC: 4.5 MMOL/L — SIGNIFICANT CHANGE UP (ref 3.5–5.3)
POTASSIUM SERPL-SCNC: 4.5 MMOL/L — SIGNIFICANT CHANGE UP (ref 3.5–5.3)
PROT SERPL-MCNC: 6.6 G/DL — SIGNIFICANT CHANGE UP (ref 6–8.3)
RBC # BLD: 3.38 M/UL — LOW (ref 4.2–5.8)
RBC # FLD: 15.8 % — HIGH (ref 10.3–14.5)
SODIUM SERPL-SCNC: 138 MMOL/L — SIGNIFICANT CHANGE UP (ref 135–145)
WBC # BLD: 10.44 K/UL — SIGNIFICANT CHANGE UP (ref 3.8–10.5)
WBC # FLD AUTO: 10.44 K/UL — SIGNIFICANT CHANGE UP (ref 3.8–10.5)

## 2023-09-09 PROCEDURE — 99233 SBSQ HOSP IP/OBS HIGH 50: CPT

## 2023-09-09 RX ORDER — SODIUM CHLORIDE 9 MG/ML
1000 INJECTION INTRAMUSCULAR; INTRAVENOUS; SUBCUTANEOUS
Refills: 0 | Status: DISCONTINUED | OUTPATIENT
Start: 2023-09-09 | End: 2023-09-14

## 2023-09-09 RX ADMIN — Medication 125 MILLILITER(S): at 14:05

## 2023-09-09 RX ADMIN — DIPHENHYDRAMINE HYDROCHLORIDE AND LIDOCAINE HYDROCHLORIDE AND ALUMINUM HYDROXIDE AND MAGNESIUM HYDRO 10 MILLILITER(S): KIT at 06:03

## 2023-09-09 RX ADMIN — SODIUM CHLORIDE 50 MILLILITER(S): 9 INJECTION INTRAMUSCULAR; INTRAVENOUS; SUBCUTANEOUS at 12:10

## 2023-09-09 RX ADMIN — DIPHENHYDRAMINE HYDROCHLORIDE AND LIDOCAINE HYDROCHLORIDE AND ALUMINUM HYDROXIDE AND MAGNESIUM HYDRO 10 MILLILITER(S): KIT at 23:23

## 2023-09-09 RX ADMIN — FLUCONAZOLE 100 MILLIGRAM(S): 150 TABLET ORAL at 14:05

## 2023-09-09 RX ADMIN — Medication 125 MILLILITER(S): at 22:14

## 2023-09-09 RX ADMIN — Medication 125 MILLILITER(S): at 06:03

## 2023-09-09 RX ADMIN — HEPARIN SODIUM 5000 UNIT(S): 5000 INJECTION INTRAVENOUS; SUBCUTANEOUS at 06:02

## 2023-09-09 RX ADMIN — HEPARIN SODIUM 5000 UNIT(S): 5000 INJECTION INTRAVENOUS; SUBCUTANEOUS at 17:46

## 2023-09-09 RX ADMIN — MIDODRINE HYDROCHLORIDE 5 MILLIGRAM(S): 2.5 TABLET ORAL at 06:02

## 2023-09-09 RX ADMIN — MIDODRINE HYDROCHLORIDE 5 MILLIGRAM(S): 2.5 TABLET ORAL at 14:05

## 2023-09-09 RX ADMIN — DIPHENHYDRAMINE HYDROCHLORIDE AND LIDOCAINE HYDROCHLORIDE AND ALUMINUM HYDROXIDE AND MAGNESIUM HYDRO 10 MILLILITER(S): KIT at 12:08

## 2023-09-09 RX ADMIN — DIPHENHYDRAMINE HYDROCHLORIDE AND LIDOCAINE HYDROCHLORIDE AND ALUMINUM HYDROXIDE AND MAGNESIUM HYDRO 10 MILLILITER(S): KIT at 17:46

## 2023-09-09 RX ADMIN — Medication 100 MILLIGRAM(S): at 12:08

## 2023-09-09 RX ADMIN — DIPHENHYDRAMINE HYDROCHLORIDE AND LIDOCAINE HYDROCHLORIDE AND ALUMINUM HYDROXIDE AND MAGNESIUM HYDRO 10 MILLILITER(S): KIT at 00:04

## 2023-09-09 NOTE — PROGRESS NOTE ADULT - PROBLEM SELECTOR PLAN 2
- Known liver mets (vs primary tumor?) s/p biopsy with IR outpatient 8/29. Will f/u results - onc will email to expedite results   - CTAP on admission w/ hepatomegaly with presumed innumerable hepatic metastatic lesions, trace ascites, mild anasarca  - TBili continuing to increase at an increasing rate, 5.5 on 9/5 up from 1.0 at beginning of August.   - Oncology consulted: Elevated CEA (164), d-dimer (5418), LDH (621). Normal PSA, CA9-19, AFP tumor marker, Hepatitis non-reactive.  - Concern for possible biliary obstruction 2/2 rapidly elevated TBili and AlkPhos compared to earlier this month; MRCP w/ slight compression of CBD by liver. Pt w/o Sx of hyperbilirubinemia.   - ERCP with stent placement on 9/6  - IR Biopsy - Undifferentiated Neuroendocrine tumor - Pt has initial consultation with Dr. Milagros Sol at [facility] Thurs. 9/7 at 11:30Am  - 9/8 - After d/w with oncology, patient would like to defer inpatient chemo.   As of 9/8 - Pt and family does not want additional medical treatment for metastatic cancer.

## 2023-09-09 NOTE — PROGRESS NOTE ADULT - PROBLEM SELECTOR PLAN 3
- BUN/Cr 46/1.67 on admission likely pre-renal ISO poor po intake.   GREG after ERCP on 9/8 BUN/Cr - 54/2.17, Currently (9/8) 65/2.79.   - Bladder scan negative, FENA <1% c/f pre-renal etiology, on 75cc/h LR.  Acute change suspicious for pre-renal 2/2 poor intake vs severe venous congestion 2/2 tumor burden.  f/u Nephrology consult 9/8  - albumin 25% q 8 x 2 days  - midodrine 10 q8 to augment renal perfusion - BUN/Cr 46/1.67 on admission likely pre-renal ISO poor po intake.   GREG after ERCP on 9/8 BUN/Cr - 54/2.17, Currently (9/8) 65/2.79.   - Bladder scan negative, FENA <1% c/f pre-renal etiology, on 75cc/h LR.  Acute change suspicious for pre-renal 2/2 poor intake vs severe venous congestion 2/2 tumor burden.  f/u Nephrology consult 9/8  - albumin 25% q 8 x 2 days  - midodrine 10 q8 to augment renal perfusion  - NaCl 50cc x 12 hours given prerenal state and poor PO intake

## 2023-09-09 NOTE — PROGRESS NOTE ADULT - PROBLEM SELECTOR PLAN 6
Diet: Recommendation was minced and moist with mildly thickened liquids + Ensure, however today 9/3 pt strongly requesting regular diet due to taste/texture preference and goal to attempt to gain weight and increase caloric intake. Risks discussed, pt expressed understanding but still prefers regular diet.    DVT ppx: SubQ heparin   Code Status: Extensive GOC discussion with pt at bedside. Says he has a lot to live for including family, grandkids and wants "everything done to keep him alive" including CPR, intubation, and pressors. Palliative made aware of pt but not formally consulted at this time. Likely will be more useful once primary diagnosis established and prognosis is more clear; pt's pain is intermittent well managed at this time   Dispo: D/c to [facility] in [place] - with hospice. Patient does not desire additional medical management of metastatic cancer.

## 2023-09-09 NOTE — PROGRESS NOTE ADULT - PROBLEM SELECTOR PLAN 1
- ISO newly found lesions (presumably cancer) to liver and pelvic bones, likely lungs   - Severe generalized weakness, pt was scared to go home following IR biopsy 8/29, sent straight to [facility]. Minimal appetite, fatigue, unintentional weight loss. Denies dysphagia or odynophagia but difficulty initiating swallow and feels like food gets "stuck at the top of stomach", leading him to feel full easily and eat minimally. Says that intermittent abd pain is UNRELATED to food; avoiding food more so because of his early satiety.  - Speech + swallow eval with cineesophagram - recommending minced and moist with mildly thick liquids 2/2 silent aspiration. Pt strongly requesting regular diet despite this rec due to poor taste/dislike for M+M diet, expresses understanding of risks but emphasizes priority is increasing caloric intake.  - Considered barium esophagram + small bowel series however was deemed poor option 2/2 aspiration and pt's inability to drink large volumes of liquid at once  - Discussion w/ family  NGT carries risk of aspiration, would be poor choice for pt at this time and he cannot go home with it; d/c is goal so onc workup can be continued.   - Esophageal Plaques found on ERCP 9/6 - Started on empiric Fluconazole / Magic Mouth wash   - PT eval --> rec home PT. - ISO newly found lesions (presumably cancer) to liver and pelvic bones, likely lungs   - Severe generalized weakness, pt was scared to go home following IR biopsy 8/29, sent straight to [facility]. Minimal appetite, fatigue, unintentional weight loss. Denies dysphagia or odynophagia but difficulty initiating swallow and feels like food gets "stuck at the top of stomach", leading him to feel full easily and eat minimally. Says that intermittent abd pain is UNRELATED to food; avoiding food more so because of his early satiety.  - Speech + swallow eval with cineesophagram - recommending minced and moist with mildly thick liquids 2/2 silent aspiration. Pt strongly requesting regular diet despite this rec due to poor taste/dislike for M+M diet, expresses understanding of risks but emphasizes priority is increasing caloric intake.  - Considered barium esophagram + small bowel series however was deemed poor option 2/2 aspiration and pt's inability to drink large volumes of liquid at once  - Discussion w/ family  NGT carries risk of aspiration, would be poor choice for pt at this time and he cannot go home with it; d/c is goal so onc workup can be continued.   - Esophageal Plaques found on ERCP 9/6 - Started on empiric Fluconazole / Magic Mouth wash   - PT eval --> rec home PT.  - patient requesting clear liquid diet at this time; open to switching back to regular and mod thick liquids later if he cannot tolerate.

## 2023-09-09 NOTE — PROGRESS NOTE ADULT - ATTENDING COMMENTS
55 minutes of total time dedicated to this patient visit today including preparing to see the patient (eg. review of tests), obtaining and/or reviewing separately obtained history, obtaining/reviewing vitals, performing a medically appropriate examination and/or evaluation, counseling and educating the patient/family/caregiver, reviewing previous notes and test results, and procedures, communicating with other health professionals (when not separately reported), and documenting clinical information in the electronic health record.

## 2023-09-09 NOTE — PROGRESS NOTE ADULT - SUBJECTIVE AND OBJECTIVE BOX
Patient is a 83y old  Male who presents with a chief complaint of Hyperkalemia, GREG (08 Sep 2023 16:56)      SUBJECTIVE / OVERNIGHT EVENTS:  Patient seen and evaluated at bedside.    Denies any fevers, chills, CP, or SOB.    Vital Signs Last 24 Hrs  T(C): 36.8 (08 Sep 2023 21:50), Max: 36.8 (08 Sep 2023 21:50)  T(F): 98.3 (08 Sep 2023 21:50), Max: 98.3 (08 Sep 2023 21:50)  HR: 69 (08 Sep 2023 21:50) (63 - 69)  BP: 105/65 (08 Sep 2023 21:50) (105/65 - 113/63)  BP(mean): --  RR: 18 (08 Sep 2023 21:50) (17 - 18)  SpO2: 97% (08 Sep 2023 21:50) (97% - 97%)    Parameters below as of 08 Sep 2023 21:50  Patient On (Oxygen Delivery Method): room air        PHYSICAL EXAM:  GENERAL: NAD, well-developed  CHEST/LUNG: Clear to auscultation bilaterally; No wheeze  HEART: Regular rate and rhythm; Normal S1 S2, No murmurs, rubs, or gallops  ABDOMEN: Soft, Nontender, Nondistended; Bowel sounds present  EXTREMITIES:  2+ Peripheral Pulses, No clubbing, cyanosis, or edema  PSYCH: AAOx3    LABS:                        9.8    8.32  )-----------( 210      ( 08 Sep 2023 04:49 )             29.3     Hgb Trend: 9.8<--, 10.7<--, 11.2<--, 10.7<--  09-08    135  |  98  |  65<H>  ----------------------------<  89  4.6   |  24  |  2.79<H>    Ca    9.3      08 Sep 2023 04:49  Phos  2.8     09-08  Mg     2.20     09-08    TPro  5.9<L>  /  Alb  2.9<L>  /  TBili  5.6<H>  /  DBili  x   /  AST  416<H>  /  ALT  99<H>  /  AlkPhos  622<H>  09-08    Creatinine Trend: 2.79<--, 2.17<--, 1.57<--, 1.53<--, 1.54<--, 1.45<--  LIVER FUNCTIONS - ( 08 Sep 2023 04:49 )  Alb: 2.9 g/dL / Pro: 5.9 g/dL / ALK PHOS: 622 U/L / ALT: 99 U/L / AST: 416 U/L / GGT: x                 Urinalysis Basic - ( 08 Sep 2023 04:49 )    Color: x / Appearance: x / SG: x / pH: x  Gluc: 89 mg/dL / Ketone: x  / Bili: x / Urobili: x   Blood: x / Protein: x / Nitrite: x   Leuk Esterase: x / RBC: x / WBC x   Sq Epi: x / Non Sq Epi: x / Bacteria: x     Patient is a 83y old  Male who presents with a chief complaint of Hyperkalemia, GREG (08 Sep 2023 16:56)      SUBJECTIVE / OVERNIGHT EVENTS:  Patient seen and evaluated at bedside.  No events overnight. Patient reports that he is otherwise feeling well. Only states he would like to trial liquid diet as he may have a better time swallowing. Using magic mouth wash as instructed.     Vital Signs Last 24 Hrs  T(C): 36.8 (08 Sep 2023 21:50), Max: 36.8 (08 Sep 2023 21:50)  T(F): 98.3 (08 Sep 2023 21:50), Max: 98.3 (08 Sep 2023 21:50)  HR: 69 (08 Sep 2023 21:50) (63 - 69)  BP: 105/65 (08 Sep 2023 21:50) (105/65 - 113/63)  BP(mean): --  RR: 18 (08 Sep 2023 21:50) (17 - 18)  SpO2: 97% (08 Sep 2023 21:50) (97% - 97%)    Parameters below as of 08 Sep 2023 21:50  Patient On (Oxygen Delivery Method): room air        PHYSICAL EXAM:  GENERAL: thin, frail and cachectic appearing   CHEST/LUNG: Clear to auscultation bilaterally; No wheeze  HEART: Regular rate and rhythm; Normal S1 S2, No murmurs, rubs, or gallops  ABDOMEN: Soft, Nontender, Nondistended; Bowel sounds present  EXTREMITIES:  2+ Peripheral Pulses, No clubbing, cyanosis, or edema  PSYCH: AAOx3    LABS:                        9.8    8.32  )-----------( 210      ( 08 Sep 2023 04:49 )             29.3     Hgb Trend: 9.8<--, 10.7<--, 11.2<--, 10.7<--  09-08    135  |  98  |  65<H>  ----------------------------<  89  4.6   |  24  |  2.79<H>    Ca    9.3      08 Sep 2023 04:49  Phos  2.8     09-08  Mg     2.20     09-08    TPro  5.9<L>  /  Alb  2.9<L>  /  TBili  5.6<H>  /  DBili  x   /  AST  416<H>  /  ALT  99<H>  /  AlkPhos  622<H>  09-08    Creatinine Trend: 2.79<--, 2.17<--, 1.57<--, 1.53<--, 1.54<--, 1.45<--  LIVER FUNCTIONS - ( 08 Sep 2023 04:49 )  Alb: 2.9 g/dL / Pro: 5.9 g/dL / ALK PHOS: 622 U/L / ALT: 99 U/L / AST: 416 U/L / GGT: x                 Urinalysis Basic - ( 08 Sep 2023 04:49 )    Color: x / Appearance: x / SG: x / pH: x  Gluc: 89 mg/dL / Ketone: x  / Bili: x / Urobili: x   Blood: x / Protein: x / Nitrite: x   Leuk Esterase: x / RBC: x / WBC x   Sq Epi: x / Non Sq Epi: x / Bacteria: x

## 2023-09-10 LAB — SURGICAL PATHOLOGY STUDY: SIGNIFICANT CHANGE UP

## 2023-09-10 PROCEDURE — 99233 SBSQ HOSP IP/OBS HIGH 50: CPT

## 2023-09-10 PROCEDURE — 99232 SBSQ HOSP IP/OBS MODERATE 35: CPT | Mod: GC

## 2023-09-10 RX ADMIN — Medication 100 MILLIGRAM(S): at 14:41

## 2023-09-10 RX ADMIN — DIPHENHYDRAMINE HYDROCHLORIDE AND LIDOCAINE HYDROCHLORIDE AND ALUMINUM HYDROXIDE AND MAGNESIUM HYDRO 10 MILLILITER(S): KIT at 23:05

## 2023-09-10 RX ADMIN — Medication 125 MILLILITER(S): at 14:38

## 2023-09-10 RX ADMIN — MIRTAZAPINE 15 MILLIGRAM(S): 45 TABLET, ORALLY DISINTEGRATING ORAL at 22:12

## 2023-09-10 RX ADMIN — HEPARIN SODIUM 5000 UNIT(S): 5000 INJECTION INTRAVENOUS; SUBCUTANEOUS at 17:02

## 2023-09-10 RX ADMIN — Medication 650 MILLIGRAM(S): at 14:48

## 2023-09-10 RX ADMIN — MIDODRINE HYDROCHLORIDE 5 MILLIGRAM(S): 2.5 TABLET ORAL at 05:56

## 2023-09-10 RX ADMIN — FLUCONAZOLE 100 MILLIGRAM(S): 150 TABLET ORAL at 14:41

## 2023-09-10 RX ADMIN — DIPHENHYDRAMINE HYDROCHLORIDE AND LIDOCAINE HYDROCHLORIDE AND ALUMINUM HYDROXIDE AND MAGNESIUM HYDRO 10 MILLILITER(S): KIT at 05:59

## 2023-09-10 RX ADMIN — Medication 650 MILLIGRAM(S): at 15:20

## 2023-09-10 RX ADMIN — DIPHENHYDRAMINE HYDROCHLORIDE AND LIDOCAINE HYDROCHLORIDE AND ALUMINUM HYDROXIDE AND MAGNESIUM HYDRO 10 MILLILITER(S): KIT at 14:42

## 2023-09-10 RX ADMIN — Medication 125 MILLILITER(S): at 05:54

## 2023-09-10 RX ADMIN — HEPARIN SODIUM 5000 UNIT(S): 5000 INJECTION INTRAVENOUS; SUBCUTANEOUS at 05:56

## 2023-09-10 NOTE — PROGRESS NOTE ADULT - ASSESSMENT
Pt is an 83 y.o. M w/ PMHx HTN, BPH, recent findings of metastatic cancer to lung, liver, and pelvic bone w/ unclear primary source at this point, present with severe generalized weakness ISO electrolyte derangements including hyponatremia and hyperkalemia along w/ GREG, most likely due to pre-renal cause secondary to poor po intake. 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 s/p ERCP with biliary stent on 9/6 (downtrending bili). Currently with poor nutritions and worsening of GREG.   Patient continues to have fatigue, poor appetite without n/v. Onc inpatient w/o any further workup. Outpatient appointment with oncology.  Pt is an 83 y.o. M w/ PMHx HTN, BPH, recent findings of metastatic cancer to lung, liver, and pelvic bone w/ unclear primary source at this point, present with severe generalized weakness ISO electrolyte derangements including hyponatremia and hyperkalemia along w/ GREG, most likely due to pre-renal cause secondary to poor po intake. 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 s/p ERCP with biliary stent on 9/6, bili trending down but now rising again. Currently with poor nutritions and worsening of GREG.   Patient continues to have fatigue, poor appetite without n/v. Defered inpatient chemo, pending hospice.

## 2023-09-10 NOTE — PROGRESS NOTE ADULT - SUBJECTIVE AND OBJECTIVE BOX
[facility] Division of Kidney Diseases & Hypertension  FOLLOW UP NOTE  [phone]--------------------------------------------------------------------------------  Chief Complaint: GREG on CKD     24 hour events/subjective: Patient seen and examined at bedside earlier this morning. Patient endorses generalized fatigue, 25lbs weight loss in 2 months and dysphagia. Otherwise no complaints. Urinating as normally.     PAST HISTORY  --------------------------------------------------------------------------------  No significant changes to PMH, PSH, FHx, SHx, unless otherwise noted    ALLERGIES & MEDICATIONS  --------------------------------------------------------------------------------  Allergies    Nuts (Angioedema)  No Known Drug Allergies  Iodine (Angioedema)  shellfish (Angioedema)    Intolerances      Standing Inpatient Medications  albumin human  5% IVPB 250 milliLiter(s) IV Intermittent every 8 hours  allopurinol 100 milliGRAM(s) Oral daily  FIRST- Mouthwash  BLM 10 milliLiter(s) Swish and Swallow four times a day  fluconAZOLE   Tablet 100 milliGRAM(s) Oral every 24 hours  heparin   Injectable 5000 Unit(s) SubCutaneous every 12 hours  midodrine 5 milliGRAM(s) Oral every 8 hours  mirtazapine 15 milliGRAM(s) Oral at bedtime  sodium chloride 0.9%. 1000 milliLiter(s) IV Continuous <Continuous>    PRN Inpatient Medications  acetaminophen     Tablet .. 650 milliGRAM(s) Oral every 6 hours PRN  polyethylene glycol 3350 17 Gram(s) Oral daily PRN  senna 2 Tablet(s) Oral at bedtime PRN      REVIEW OF SYSTEMS  --------------------------------------------------------------------------------  Gen: No  fevers/chills  Skin: No rashes  Head/Eyes/Ears/Mouth: No headache; Normal hearing; Normal vision w/o blurriness  Respiratory: No dyspnea, cough, wheezing, hemoptysis  CV: No chest pain, PND, orthopnea  GI: No abdominal pain, diarrhea, constipation, nausea, vomiting  : No increased frequency, dysuria, hematuria, nocturia  MSK: No joint pain/swelling; no back pain; no edema  Neuro: No dizziness/lightheadedness, weakness, seizures, numbness, tingling      All other systems were reviewed and are negative, except as noted.    VITALS/PHYSICAL EXAM  --------------------------------------------------------------------------------  T(C): 36.6 (09-10-23 @ 05:30), Max: 36.6 (09-09-23 @ 12:36)  HR: 60 (09-10-23 @ 05:30) (58 - 60)  BP: 102/61 (09-10-23 @ 05:30) (102/61 - 124/73)  RR: 17 (09-10-23 @ 05:30) (17 - 17)  SpO2: 98% (09-10-23 @ 05:30) (97% - 100%)  Wt(kg): --      09-09-23 @ 07:01  -  09-10-23 @ 07:00  --------------------------------------------------------  IN: 750 mL / OUT: 450 mL / NET: 300 mL    Physical Exam:  Gen: NAD, cachectic  HEENT: dry mucous membranes   Pulm: CTA B/L  CV: S1S2  Abd:  Soft, Tender to RUQ	  Ext:  b/l LE pitting edema up to the knees  Neuro: Awake  Skin: Warm and dry  Vascular access: peripheral IV     LABS/STUDIES  --------------------------------------------------------------------------------              10.8   10.44 >-----------<  240      [09-09-23 @ 07:40]              32.8     138  |  98  |  64  ----------------------------<  89      [09-09-23 @ 07:40]  4.5   |  24  |  2.28        Ca     10.0     [09-09-23 @ 07:40]      Mg     2.20     [09-09-23 @ 07:40]      Phos  2.9     [09-09-23 @ 07:40]    TPro  6.6  /  Alb  3.7  /  TBili  6.9  /  DBili  x   /  AST  490  /  ALT  97  /  AlkPhos  651  [09-09-23 @ 07:40]    Creatinine Trend:  SCr 2.28 [09-09 @ 07:40]  SCr 2.79 [09-08 @ 04:49]  SCr 2.17 [09-07 @ 05:20]  SCr 1.57 [09-06 @ 03:10]  SCr 1.53 [09-05 @ 07:11]    Urine Creatinine 207      [09-07-23 @ 11:08]  Urine Protein 78      [09-05-23 @ 12:33]  Urine Sodium 29      [09-07-23 @ 11:08]  Urine Urea Nitrogen 792.0      [09-07-23 @ 11:08]  Urine Potassium 66.8      [09-05-23 @ 12:33]  Urine Osmolality 641      [09-05-23 @ 12:33]   [facility] Division of Kidney Diseases & Hypertension  FOLLOW UP NOTE  [phone]--------------------------------------------------------------------------------  Chief Complaint: GREG on CKD     24 hour events/subjective: Patient seen and examined at bedside earlier this morning. Patient endorses generalized fatigue, 25lbs weight loss in 2 months and dysphagia. Otherwise no complaints. Urinating as normally.     PAST HISTORY  --------------------------------------------------------------------------------  No significant changes to PMH, PSH, FHx, SHx, unless otherwise noted    ALLERGIES & MEDICATIONS  --------------------------------------------------------------------------------  Allergies    Nuts (Angioedema)  No Known Drug Allergies  Iodine (Angioedema)  shellfish (Angioedema)    Intolerances      Standing Inpatient Medications  albumin human  5% IVPB 250 milliLiter(s) IV Intermittent every 8 hours  allopurinol 100 milliGRAM(s) Oral daily  FIRST- Mouthwash  BLM 10 milliLiter(s) Swish and Swallow four times a day  fluconAZOLE   Tablet 100 milliGRAM(s) Oral every 24 hours  heparin   Injectable 5000 Unit(s) SubCutaneous every 12 hours  midodrine 5 milliGRAM(s) Oral every 8 hours  mirtazapine 15 milliGRAM(s) Oral at bedtime  sodium chloride 0.9%. 1000 milliLiter(s) IV Continuous <Continuous>    PRN Inpatient Medications  acetaminophen     Tablet .. 650 milliGRAM(s) Oral every 6 hours PRN  polyethylene glycol 3350 17 Gram(s) Oral daily PRN  senna 2 Tablet(s) Oral at bedtime PRN      REVIEW OF SYSTEMS  --------------------------------------------------------------------------------  Gen: +fatigue, No fevers/chills  Head/Eyes/Ears/Mouth: No headache  Respiratory: No dyspnea  CV: No chest pain, PND  GI: +abdominal pain, dysphagia   : No dysuria, hematuria  MSK: +LE edema, No joint pain  Neuro: No dizziness    All other systems were reviewed and are negative, except as noted.    VITALS/PHYSICAL EXAM  --------------------------------------------------------------------------------  T(C): 36.6 (09-10-23 @ 05:30), Max: 36.6 (09-09-23 @ 12:36)  HR: 60 (09-10-23 @ 05:30) (58 - 60)  BP: 102/61 (09-10-23 @ 05:30) (102/61 - 124/73)  RR: 17 (09-10-23 @ 05:30) (17 - 17)  SpO2: 98% (09-10-23 @ 05:30) (97% - 100%)  Wt(kg): --      09-09-23 @ 07:01  -  09-10-23 @ 07:00  --------------------------------------------------------  IN: 750 mL / OUT: 450 mL / NET: 300 mL    Physical Exam:  Gen: NAD, cachectic  HEENT: dry mucous membranes   Pulm: CTA B/L  CV: S1S2  Abd:  Soft, Tender to RUQ	  Ext:  b/l LE pitting edema up to the knees  Neuro: Awake  Skin: Warm and dry  Vascular access: peripheral IV     LABS/STUDIES  --------------------------------------------------------------------------------              10.8   10.44 >-----------<  240      [09-09-23 @ 07:40]              32.8     138  |  98  |  64  ----------------------------<  89      [09-09-23 @ 07:40]  4.5   |  24  |  2.28        Ca     10.0     [09-09-23 @ 07:40]      Mg     2.20     [09-09-23 @ 07:40]      Phos  2.9     [09-09-23 @ 07:40]    TPro  6.6  /  Alb  3.7  /  TBili  6.9  /  DBili  x   /  AST  490  /  ALT  97  /  AlkPhos  651  [09-09-23 @ 07:40]    Creatinine Trend:  SCr 2.28 [09-09 @ 07:40]  SCr 2.79 [09-08 @ 04:49]  SCr 2.17 [09-07 @ 05:20]  SCr 1.57 [09-06 @ 03:10]  SCr 1.53 [09-05 @ 07:11]    Urine Creatinine 207      [09-07-23 @ 11:08]  Urine Protein 78      [09-05-23 @ 12:33]  Urine Sodium 29      [09-07-23 @ 11:08]  Urine Urea Nitrogen 792.0      [09-07-23 @ 11:08]  Urine Potassium 66.8      [09-05-23 @ 12:33]  Urine Osmolality 641      [09-05-23 @ 12:33]   [facility] Division of Kidney Diseases & Hypertension  FOLLOW UP NOTE  [phone]--------------------------------------------------------------------------------    Chief Complaint: GREG      24 hour events/subjective: Pt. seen and examined at bedside earlier this morning. Patient endorses generalized fatigue, ~25-30 lbs weight loss over last 2 months, dysphagia and LE edema. No fever, SOB or CP during rounds. Pt. reports good UOP. Pt. denies previous history of kidney disease.    PAST HISTORY  --------------------------------------------------------------------------------  No significant changes to PMH, PSH, FHx, SHx, unless otherwise noted    ALLERGIES & MEDICATIONS  --------------------------------------------------------------------------------  Allergies    Nuts (Angioedema)  No Known Drug Allergies  Iodine (Angioedema)  shellfish (Angioedema)    Intolerances    Standing Inpatient Medications  albumin human  5% IVPB 250 milliLiter(s) IV Intermittent every 8 hours  allopurinol 100 milliGRAM(s) Oral daily  FIRST- Mouthwash  BLM 10 milliLiter(s) Swish and Swallow four times a day  fluconAZOLE   Tablet 100 milliGRAM(s) Oral every 24 hours  heparin   Injectable 5000 Unit(s) SubCutaneous every 12 hours  midodrine 5 milliGRAM(s) Oral every 8 hours  mirtazapine 15 milliGRAM(s) Oral at bedtime  sodium chloride 0.9%. 1000 milliLiter(s) IV Continuous <Continuous>    PRN Inpatient Medications  acetaminophen     Tablet .. 650 milliGRAM(s) Oral every 6 hours PRN  polyethylene glycol 3350 17 Gram(s) Oral daily PRN  senna 2 Tablet(s) Oral at bedtime PRN    REVIEW OF SYSTEMS  --------------------------------------------------------------------------------  Gen: +fatigue, no fevers/chills  Head/Eyes/Ears/Mouth: No headache  Respiratory: No dyspnea  CV: No chest pain  GI: +dysphagia   : No dysuria, hematuria  MSK: +B/L LE edema  Neuro: No dizziness    All other systems were reviewed and are negative, except as noted.    VITALS/PHYSICAL EXAM  --------------------------------------------------------------------------------  T(C): 36.6 (09-10-23 @ 05:30), Max: 36.6 (09-09-23 @ 12:36)  HR: 60 (09-10-23 @ 05:30) (58 - 60)  BP: 102/61 (09-10-23 @ 05:30) (102/61 - 124/73)  RR: 17 (09-10-23 @ 05:30) (17 - 17)  SpO2: 98% (09-10-23 @ 05:30) (97% - 100%)  Wt(kg): --    09-09-23 @ 07:01  -  09-10-23 @ 07:00  --------------------------------------------------------  IN: 750 mL / OUT: 450 mL / NET: 300 mL    Physical Exam:  Gen: resting, cachectic  HEENT: dry mucous membranes   Pulm: CTA B/L  CV: S1S2+  Abd:  Soft 	  Ext: B/L LE pitting edema  Neuro: Awake, alert  Skin: Warm and dry  Vascular access: Peripheral IV line     LABS/STUDIES  --------------------------------------------------------------------------------              10.8   10.44 >-----------<  240      [09-09-23 @ 07:40]              32.8     138  |  98  |  64  ----------------------------<  89      [09-09-23 @ 07:40]  4.5   |  24  |  2.28        Ca     10.0     [09-09-23 @ 07:40]      Mg     2.20     [09-09-23 @ 07:40]      Phos  2.9     [09-09-23 @ 07:40]    TPro  6.6  /  Alb  3.7  /  TBili  6.9  /  DBili  x   /  AST  490  /  ALT  97  /  AlkPhos  651  [09-09-23 @ 07:40]    Creatinine Trend:  SCr 2.28 [09-09 @ 07:40]  SCr 2.79 [09-08 @ 04:49]  SCr 2.17 [09-07 @ 05:20]  SCr 1.57 [09-06 @ 03:10]  SCr 1.53 [09-05 @ 07:11]

## 2023-09-10 NOTE — PROGRESS NOTE ADULT - PROBLEM SELECTOR PLAN 3
- BUN/Cr 46/1.67 on admission likely pre-renal ISO poor po intake.   GREG after ERCP on 9/8 BUN/Cr - 54/2.17, Currently (9/8) 65/2.79.   - Bladder scan negative, FENA <1% c/f pre-renal etiology, on 75cc/h LR.  Acute change suspicious for pre-renal 2/2 poor intake vs severe venous congestion 2/2 tumor burden.  f/u Nephrology consult 9/8  - albumin 25% q 8 x 2 days  - midodrine 10 q8 to augment renal perfusion  - NaCl 50cc x 12 hours given prerenal state and poor PO intake

## 2023-09-10 NOTE — PROGRESS NOTE ADULT - ATTENDING COMMENTS
Pt. with GREG. Scr decreased to 2.28 yesterday. Assessment and plan for GREG as outlined above. Monitor labs and urine output. Avoid any potential nephrotoxins. Dose medications as per eGFR.

## 2023-09-10 NOTE — PROGRESS NOTE ADULT - SUBJECTIVE AND OBJECTIVE BOX
***************************************************************  Marco A Melo  (PGY1) Internal Medicine  On TEAMS  ***************************************************************    PROGRESS NOTE:     Patient is a 83y old  Male who presents with a chief complaint of Hyperkalemia, GREG (09 Sep 2023 08:13)      SUBJECTIVE / OVERNIGHT EVENTS:      MEDICATIONS  (STANDING):  albumin human  5% IVPB 250 milliLiter(s) IV Intermittent every 8 hours  allopurinol 100 milliGRAM(s) Oral daily  FIRST- Mouthwash  BLM 10 milliLiter(s) Swish and Swallow four times a day  fluconAZOLE   Tablet 100 milliGRAM(s) Oral every 24 hours  heparin   Injectable 5000 Unit(s) SubCutaneous every 12 hours  midodrine 5 milliGRAM(s) Oral every 8 hours  mirtazapine 15 milliGRAM(s) Oral at bedtime  sodium chloride 0.9%. 1000 milliLiter(s) (50 mL/Hr) IV Continuous <Continuous>    MEDICATIONS  (PRN):  acetaminophen     Tablet .. 650 milliGRAM(s) Oral every 6 hours PRN Moderate Pain (4 - 6)  polyethylene glycol 3350 17 Gram(s) Oral daily PRN Constipation  senna 2 Tablet(s) Oral at bedtime PRN Constipation      CAPILLARY BLOOD GLUCOSE        I&O's Summary    09 Sep 2023 07:01  -  10 Sep 2023 06:26  --------------------------------------------------------  IN: 500 mL / OUT: 200 mL / NET: 300 mL        PHYSICAL EXAM:  Vital Signs Last 24 Hrs  T(C): 36.6 (10 Sep 2023 05:30), Max: 36.6 (09 Sep 2023 12:36)  T(F): 97.9 (10 Sep 2023 05:30), Max: 97.9 (10 Sep 2023 05:30)  HR: 60 (10 Sep 2023 05:30) (58 - 60)  BP: 102/61 (10 Sep 2023 05:30) (102/61 - 124/73)  BP(mean): --  RR: 17 (10 Sep 2023 05:30) (17 - 17)  SpO2: 98% (10 Sep 2023 05:30) (97% - 100%)    Parameters below as of 10 Sep 2023 05:30  Patient On (Oxygen Delivery Method): room air        General : NAD  CV: RRR no R/M/G  Lungs: CTAB  Abd : Soft, non-tender, non-distended  Extremities : No LE Edema  Neuro : A&Ox3    LABS:                        10.8   10.44 )-----------( 240      ( 09 Sep 2023 07:40 )             32.8     09-09    138  |  98  |  64<H>  ----------------------------<  89  4.5   |  24  |  2.28<H>    Ca    10.0      09 Sep 2023 07:40  Phos  2.9     09-09  Mg     2.20     09-09    TPro  6.6  /  Alb  3.7  /  TBili  6.9<H>  /  DBili  x   /  AST  490<H>  /  ALT  97<H>  /  AlkPhos  651<H>  09-09      Urinalysis Basic - ( 09 Sep 2023 07:40 )    Color: x / Appearance: x / SG: x / pH: x  Gluc: 89 mg/dL / Ketone: x  / Bili: x / Urobili: x   Blood: x / Protein: x / Nitrite: x   Leuk Esterase: x / RBC: x / WBC x   Sq Epi: x / Non Sq Epi: x / Bacteria: x    Medications (Standing)  MEDICATIONS  (STANDING):  albumin human  5% IVPB 250 milliLiter(s) IV Intermittent every 8 hours  allopurinol 100 milliGRAM(s) Oral daily  FIRST- Mouthwash  BLM 10 milliLiter(s) Swish and Swallow four times a day  fluconAZOLE   Tablet 100 milliGRAM(s) Oral every 24 hours  heparin   Injectable 5000 Unit(s) SubCutaneous every 12 hours  midodrine 5 milliGRAM(s) Oral every 8 hours  mirtazapine 15 milliGRAM(s) Oral at bedtime  sodium chloride 0.9%. 1000 milliLiter(s) (50 mL/Hr) IV Continuous <Continuous>        COORDINATION OF CARE:  Care Discussed with Consultants/Other Providers [Y/N]:  Prior or Outpatient Records Reviewed [Y/N]:   ***************************************************************  Marco A Melo  (PGY1) Internal Medicine  On TEAMS  ***************************************************************    PROGRESS NOTE:     Patient is a 83y old  Male who presents with a chief complaint of Hyperkalemia, GREG (09 Sep 2023 08:13)      SUBJECTIVE / OVERNIGHT EVENTS: Patient seen at the [facility] this morning. No acute overnight events. Patient denies fever, chills, nausea, vomitting, chest pain, shortness of breath, abdominal pain, diarrhea or constipation.   LE swelling bilaterally  Making urine, dark in color.   Dysphagia to both solid and liquids. Poor Appetite.     MEDICATIONS  (STANDING):  albumin human  5% IVPB 250 milliLiter(s) IV Intermittent every 8 hours  allopurinol 100 milliGRAM(s) Oral daily  FIRST- Mouthwash  BLM 10 milliLiter(s) Swish and Swallow four times a day  fluconAZOLE   Tablet 100 milliGRAM(s) Oral every 24 hours  heparin   Injectable 5000 Unit(s) SubCutaneous every 12 hours  midodrine 5 milliGRAM(s) Oral every 8 hours  mirtazapine 15 milliGRAM(s) Oral at bedtime  sodium chloride 0.9%. 1000 milliLiter(s) (50 mL/Hr) IV Continuous <Continuous>    MEDICATIONS  (PRN):  acetaminophen     Tablet .. 650 milliGRAM(s) Oral every 6 hours PRN Moderate Pain (4 - 6)  polyethylene glycol 3350 17 Gram(s) Oral daily PRN Constipation  senna 2 Tablet(s) Oral at bedtime PRN Constipation      CAPILLARY BLOOD GLUCOSE        I&O's Summary    09 Sep 2023 07:01  -  10 Sep 2023 06:26  --------------------------------------------------------  IN: 500 mL / OUT: 200 mL / NET: 300 mL        PHYSICAL EXAM:  Vital Signs Last 24 Hrs  T(C): 36.6 (10 Sep 2023 05:30), Max: 36.6 (09 Sep 2023 12:36)  T(F): 97.9 (10 Sep 2023 05:30), Max: 97.9 (10 Sep 2023 05:30)  HR: 60 (10 Sep 2023 05:30) (58 - 60)  BP: 102/61 (10 Sep 2023 05:30) (102/61 - 124/73)  BP(mean): --  RR: 17 (10 Sep 2023 05:30) (17 - 17)  SpO2: 98% (10 Sep 2023 05:30) (97% - 100%)    Parameters below as of 10 Sep 2023 05:30  Patient On (Oxygen Delivery Method): room air    General : NAD  CV: S1/S2   Lungs: CTAB  Abd : Soft, Tender to RUQ, with palpable liver, distended superficial veins of his abdomin   Extremities : 2+ Pitting Edema in b/l lower extremities, no erythema, non-tender to palpation.   Neuro : A&Ox3        LABS:                        10.8   10.44 )-----------( 240      ( 09 Sep 2023 07:40 )             32.8     09-09    138  |  98  |  64<H>  ----------------------------<  89  4.5   |  24  |  2.28<H>    Ca    10.0      09 Sep 2023 07:40  Phos  2.9     09-09  Mg     2.20     09-09    TPro  6.6  /  Alb  3.7  /  TBili  6.9<H>  /  DBili  x   /  AST  490<H>  /  ALT  97<H>  /  AlkPhos  651<H>  09-09      Urinalysis Basic - ( 09 Sep 2023 07:40 )    Color: x / Appearance: x / SG: x / pH: x  Gluc: 89 mg/dL / Ketone: x  / Bili: x / Urobili: x   Blood: x / Protein: x / Nitrite: x   Leuk Esterase: x / RBC: x / WBC x   Sq Epi: x / Non Sq Epi: x / Bacteria: x    Medications (Standing)  MEDICATIONS  (STANDING):  albumin human  5% IVPB 250 milliLiter(s) IV Intermittent every 8 hours  allopurinol 100 milliGRAM(s) Oral daily  FIRST- Mouthwash  BLM 10 milliLiter(s) Swish and Swallow four times a day  fluconAZOLE   Tablet 100 milliGRAM(s) Oral every 24 hours  heparin   Injectable 5000 Unit(s) SubCutaneous every 12 hours  midodrine 5 milliGRAM(s) Oral every 8 hours  mirtazapine 15 milliGRAM(s) Oral at bedtime  sodium chloride 0.9%. 1000 milliLiter(s) (50 mL/Hr) IV Continuous <Continuous>        COORDINATION OF CARE:  Care Discussed with Consultants/Other Providers [Y/N]:  Prior or Outpatient Records Reviewed [Y/N]:

## 2023-09-10 NOTE — PROGRESS NOTE ADULT - PROBLEM SELECTOR PLAN 2
- Known liver mets (vs primary tumor?) s/p biopsy with IR outpatient 8/29. Will f/u results - onc will email to expedite results   - CTAP on admission w/ hepatomegaly with presumed innumerable hepatic metastatic lesions, trace ascites, mild anasarca  - TBili continuing to increase at an increasing rate, 5.5 on 9/5 up from 1.0 at beginning of August.   - Oncology consulted: Elevated CEA (164), d-dimer (5418), LDH (621). Normal PSA, CA9-19, AFP tumor marker, Hepatitis non-reactive.  - Concern for possible biliary obstruction 2/2 rapidly elevated TBili and AlkPhos compared to earlier this month; MRCP w/ slight compression of CBD by liver. Pt w/o Sx of hyperbilirubinemia.   - ERCP with stent placement on 9/6  - IR Biopsy - Undifferentiated Neuroendocrine tumor - Pt has initial consultation with Dr. Milagros Sol at [facility] Thurs. 9/7 at 11:30Am  - 9/8 - After d/w with oncology, patient would like to defer inpatient chemo.   As of 9/8 - Pt and family does not want additional medical treatment for metastatic cancer. - Known liver mets (vs primary tumor?) s/p biopsy with IR outpatient 8/29. Will f/u results - onc will email to expedite results   - CTAP on admission w/ hepatomegaly with presumed innumerable hepatic metastatic lesions, trace ascites, mild anasarca  - TBili continuing to increase at an increasing rate, 5.5 on 9/5 up from 1.0 at beginning of August.   - Oncology consulted: Elevated CEA (164), d-dimer (5418), LDH (621). Normal PSA, CA9-19, AFP tumor marker, Hepatitis non-reactive.  - Concern for possible biliary obstruction 2/2 rapidly elevated TBili and AlkPhos compared to earlier this month; MRCP w/ slight compression of CBD by liver. Pt w/o Sx of hyperbilirubinemia.   - ERCP with stent placement on 9/6 - downtrending bili intially but currently 9/10 rising - plan to f/u with GI   - IR Biopsy - Undifferentiated Neuroendocrine tumor - Pt has initial consultation with Dr. Milagros Sol at [facility] Thurs. 9/7 at 11:30Am  - 9/8 - After d/w with oncology, patient would like to defer inpatient chemo.   As of 9/8 - Pt and family does not want additional medical treatment for metastatic cancer.

## 2023-09-10 NOTE — PROVIDER CONTACT NOTE (OTHER) - ASSESSMENT
pt is a&o4, no acute stress noted, pt. denied headache and sizziness. vs- pt hr is 50, bp is 100/60, rr is 18, 02 is 97%, temp ia 98.1

## 2023-09-10 NOTE — PROGRESS NOTE ADULT - ATTENDING COMMENTS
84 yo M PMH HTN, BPH, recent findings of metastatic cancer to lung, liver, and pelvic bone w/ unclear primary source at this point, presents with severe generalized weakness c/b electrolyte derangements including hyponatremia and hyperkalemia along w/ GREG, most likely due to pre-renal cause secondary to poor po intake. c/b hyperbilirubinemia s/p ERCP path +poorly differentiated neuroendocrine tumor- hematology/oncology evaluated, patient electing hospice, DNR. Hospice referral to be made.

## 2023-09-10 NOTE — PROGRESS NOTE ADULT - PROBLEM SELECTOR PLAN 1
- ISO newly found lesions (presumably cancer) to liver and pelvic bones, likely lungs   - Severe generalized weakness, pt was scared to go home following IR biopsy 8/29, sent straight to [facility]. Minimal appetite, fatigue, unintentional weight loss. Denies dysphagia or odynophagia but difficulty initiating swallow and feels like food gets "stuck at the top of stomach", leading him to feel full easily and eat minimally. Says that intermittent abd pain is UNRELATED to food; avoiding food more so because of his early satiety.  - Speech + swallow eval with cineesophagram - recommending minced and moist with mildly thick liquids 2/2 silent aspiration. Pt strongly requesting regular diet despite this rec due to poor taste/dislike for M+M diet, expresses understanding of risks but emphasizes priority is increasing caloric intake.  - Considered barium esophagram + small bowel series however was deemed poor option 2/2 aspiration and pt's inability to drink large volumes of liquid at once  - Discussion w/ family  NGT carries risk of aspiration, would be poor choice for pt at this time and he cannot go home with it; d/c is goal so onc workup can be continued.   - Esophageal Plaques found on ERCP 9/6 - Started on empiric Fluconazole / Magic Mouth wash   - PT eval --> rec home PT.  - patient requesting clear liquid diet at this time; open to switching back to regular and mod thick liquids later if he cannot tolerate.

## 2023-09-10 NOTE — PROGRESS NOTE ADULT - TIME BILLING
55 minutes of total time dedicated to this patient visit today including preparing to see the patient (eg. review of tests), obtaining and/or reviewing separately obtained history, obtaining/reviewing vitals, performing a medically appropriate examination and/or evaluation, counseling and educating the patient/family/caregiver, reviewing previous notes and test results, and procedures, communicating with other health professionals (when not separately reported), and documenting clinical information in the electronic health record.
Time reflective of charting, documentation and face to face evaluation    Extensive discussion regarding diagnosis, w/u and NGT as mentioned above

## 2023-09-10 NOTE — PROGRESS NOTE ADULT - PROBLEM SELECTOR PLAN 1
Pt with hemodynamic mediated GREG on CKD in the setting of hypotension, poor po intake and malignancy. On review of [facility]/HIE: Scr 1.3 on 6/29/2022 and 1.6 on 8/8/2023. On admission Scr 1.95, peaked 2.79 (9/8). UA: Protein 100, RBC 2-5, WBC 2. Non nephrotic range proteinuria with UPCR 0.4. CT abd/pelvis: possible stones present along the right posterior wall of bladder. Renal US: Renal parenchymal disease without hydronephrosis/stones. Patient hemodynamically stable. Labs and VS reviewed. Scr today elevated/improved to 2.28. 24hr UOP 450cc. Continue supportive measures. Avoid hypotension and nephrotoxic agents. Monitor labs, VS and I/O. Dose medications for eGFR.     Final recommendations pending attending signature.    If you have any questions, please feel free to contact me  Tong Lopez  Nephrology Fellow  [employer]/Page [phone]  (After 5pm or on weekends please page the on-call fellow) Pt. with hemodynamically mediated GREG in the setting of hypotension and poor oral intake. On review of [place]/HIE, Scr was 1.6 on 8/8/23. On admission, Scr was elevated at 1.95, peaked to 2.79 on 9/8. Scr decreased to 2.28 on 9/9/23. UPCR was mildly elevated at 0.4. CT abd/pelvis showed possible stones present along the right posterior bladder wall. Kidney US showed renal parenchymal disease without hydronephrosis/stones. Monitor labs and urine output. Avoid any potential nephrotoxins. Dose medications as per eGFR.    If you have any questions, please feel free to contact me  Tong Lopez  Nephrology Fellow  [employer]/Page [phone]  (After 5pm or on weekends please page the on-call fellow)

## 2023-09-10 NOTE — PROGRESS NOTE ADULT - PROBLEM SELECTOR PLAN 6
Diet: Recommendation was minced and moist with mildly thickened liquids + Ensure, however today 9/3 pt strongly requesting regular diet due to taste/texture preference and goal to attempt to gain weight and increase caloric intake. Risks discussed, pt expressed understanding but still prefers regular diet.    DVT ppx: SubQ heparin   Code Status: Extensive GOC discussion with pt at bedside. Says he has a lot to live for including family, grandkids and wants "everything done to keep him alive" including CPR, intubation, and pressors. Palliative made aware of pt but not formally consulted at this time. Likely will be more useful once primary diagnosis established and prognosis is more clear; pt's pain is intermittent well managed at this time   Dispo: D/c to [facility] [place] - with hospice. Patient does not desire additional medical management of metastatic cancer.

## 2023-09-11 PROCEDURE — 93010 ELECTROCARDIOGRAM REPORT: CPT

## 2023-09-11 PROCEDURE — 99232 SBSQ HOSP IP/OBS MODERATE 35: CPT | Mod: GC

## 2023-09-11 RX ORDER — SODIUM CHLORIDE 9 MG/ML
1000 INJECTION, SOLUTION INTRAVENOUS
Refills: 0 | Status: DISCONTINUED | OUTPATIENT
Start: 2023-09-11 | End: 2023-09-13

## 2023-09-11 RX ADMIN — DIPHENHYDRAMINE HYDROCHLORIDE AND LIDOCAINE HYDROCHLORIDE AND ALUMINUM HYDROXIDE AND MAGNESIUM HYDRO 10 MILLILITER(S): KIT at 05:53

## 2023-09-11 RX ADMIN — DIPHENHYDRAMINE HYDROCHLORIDE AND LIDOCAINE HYDROCHLORIDE AND ALUMINUM HYDROXIDE AND MAGNESIUM HYDRO 10 MILLILITER(S): KIT at 18:42

## 2023-09-11 RX ADMIN — SODIUM CHLORIDE 50 MILLILITER(S): 9 INJECTION, SOLUTION INTRAVENOUS at 16:35

## 2023-09-11 RX ADMIN — MIDODRINE HYDROCHLORIDE 5 MILLIGRAM(S): 2.5 TABLET ORAL at 22:01

## 2023-09-11 RX ADMIN — MIDODRINE HYDROCHLORIDE 5 MILLIGRAM(S): 2.5 TABLET ORAL at 05:52

## 2023-09-11 RX ADMIN — HEPARIN SODIUM 5000 UNIT(S): 5000 INJECTION INTRAVENOUS; SUBCUTANEOUS at 18:42

## 2023-09-11 RX ADMIN — HEPARIN SODIUM 5000 UNIT(S): 5000 INJECTION INTRAVENOUS; SUBCUTANEOUS at 05:52

## 2023-09-11 RX ADMIN — FLUCONAZOLE 100 MILLIGRAM(S): 150 TABLET ORAL at 13:10

## 2023-09-11 RX ADMIN — DIPHENHYDRAMINE HYDROCHLORIDE AND LIDOCAINE HYDROCHLORIDE AND ALUMINUM HYDROXIDE AND MAGNESIUM HYDRO 10 MILLILITER(S): KIT at 13:10

## 2023-09-11 RX ADMIN — Medication 100 MILLIGRAM(S): at 13:10

## 2023-09-11 NOTE — PROGRESS NOTE ADULT - ATTENDING COMMENTS
82 yo gentlemen w/ PMHx HTN, BPH, recent findings of metastatic cancer to lung, liver, and pelvic bone w/ unclear primary source at this point, present with severe generalized weakness c/b electrolyte derangements including hyponatremia and hyperkalemia along w/ GREG, most likely due to pre-renal cause secondary to poor po intake.    Patient biopsy with neuroendocrine cancer/small cell, electing to go on hospice after discussion with onc  Continue the rest of the work up and management as stated above.

## 2023-09-11 NOTE — PROVIDER CONTACT NOTE (OTHER) - ACTION/TREATMENT ORDERED:
MD notified and made aware. Midodrine held due to parameters Hr less than 55. No new interventions ordered at this time.

## 2023-09-11 NOTE — PROGRESS NOTE ADULT - SUBJECTIVE AND OBJECTIVE BOX
***************************************************************  Marco A Melo  (PGY1) Internal Medicine  On TEAMS  ***************************************************************    PROGRESS NOTE:     Patient is a 83y old  Male who presents with a chief complaint of Hyperkalemia, GREG (10 Sep 2023 10:27)    SUBJECTIVE / OVERNIGHT EVENTS:      MEDICATIONS  (STANDING):  allopurinol 100 milliGRAM(s) Oral daily  FIRST- Mouthwash  BLM 10 milliLiter(s) Swish and Swallow four times a day  fluconAZOLE   Tablet 100 milliGRAM(s) Oral every 24 hours  heparin   Injectable 5000 Unit(s) SubCutaneous every 12 hours  midodrine 5 milliGRAM(s) Oral every 8 hours  mirtazapine 15 milliGRAM(s) Oral at bedtime  sodium chloride 0.9%. 1000 milliLiter(s) (50 mL/Hr) IV Continuous <Continuous>    MEDICATIONS  (PRN):  acetaminophen     Tablet .. 650 milliGRAM(s) Oral every 6 hours PRN Moderate Pain (4 - 6)  polyethylene glycol 3350 17 Gram(s) Oral daily PRN Constipation  senna 2 Tablet(s) Oral at bedtime PRN Constipation      CAPILLARY BLOOD GLUCOSE        I&O's Summary    09 Sep 2023 07:01  -  10 Sep 2023 07:00  --------------------------------------------------------  IN: 750 mL / OUT: 450 mL / NET: 300 mL        PHYSICAL EXAM:  Vital Signs Last 24 Hrs  T(C): 36.7 (11 Sep 2023 05:50), Max: 36.7 (10 Sep 2023 21:39)  T(F): 98.1 (11 Sep 2023 05:50), Max: 98.1 (10 Sep 2023 21:39)  HR: 48 (11 Sep 2023 05:50) (47 - 50)  BP: 113/62 (11 Sep 2023 05:50) (100/60 - 115/58)  BP(mean): --  RR: 17 (11 Sep 2023 05:50) (16 - 18)  SpO2: 100% (11 Sep 2023 05:50) (97% - 100%)    Parameters below as of 11 Sep 2023 05:50  Patient On (Oxygen Delivery Method): room air        General : NAD  CV: RRR no R/M/G  Lungs: CTAB  Abd : Soft, non-tender, non-distended  Extremities : No LE Edema  Neuro : A&Ox3    LABS:                        10.8   10.44 )-----------( 240      ( 09 Sep 2023 07:40 )             32.8     09-09    138  |  98  |  64<H>  ----------------------------<  89  4.5   |  24  |  2.28<H>    Ca    10.0      09 Sep 2023 07:40  Phos  2.9     09-09  Mg     2.20     09-09    TPro  6.6  /  Alb  3.7  /  TBili  6.9<H>  /  DBili  x   /  AST  490<H>  /  ALT  97<H>  /  AlkPhos  651<H>  09-09          Urinalysis Basic - ( 09 Sep 2023 07:40 )    Color: x / Appearance: x / SG: x / pH: x  Gluc: 89 mg/dL / Ketone: x  / Bili: x / Urobili: x   Blood: x / Protein: x / Nitrite: x   Leuk Esterase: x / RBC: x / WBC x   Sq Epi: x / Non Sq Epi: x / Bacteria: x          Medications (Standing)  MEDICATIONS  (STANDING):  allopurinol 100 milliGRAM(s) Oral daily  FIRST- Mouthwash  BLM 10 milliLiter(s) Swish and Swallow four times a day  fluconAZOLE   Tablet 100 milliGRAM(s) Oral every 24 hours  heparin   Injectable 5000 Unit(s) SubCutaneous every 12 hours  midodrine 5 milliGRAM(s) Oral every 8 hours  mirtazapine 15 milliGRAM(s) Oral at bedtime  sodium chloride 0.9%. 1000 milliLiter(s) (50 mL/Hr) IV Continuous <Continuous>        COORDINATION OF CARE:  Care Discussed with Consultants/Other Providers [Y/N]:  Prior or Outpatient Records Reviewed [Y/N]:   ***************************************************************  Marco A Melo  (PGY1) Internal Medicine  On TEAMS  ***************************************************************    PROGRESS NOTE:     Patient is a 83y old  Male who presents with a chief complaint of Hyperkalemia, GREG (10 Sep 2023 10:27)    SUBJECTIVE / OVERNIGHT EVENTS: Patient seen at the bedside this morning. Overnight phyllis to 48, asymptomatic, EKG reviewed, sinus asymptomatic. BP stable. Slept well, appetitie improving, wants to get regular diet today. Last BM yesterday, loose. Urinating more than previous day.   Patient denies fever, chills, nausea, vomitting, chest pain, shortness of breath, abdominal pain, diarrhea or constipation.       MEDICATIONS  (STANDING):  allopurinol 100 milliGRAM(s) Oral daily  FIRST- Mouthwash  BLM 10 milliLiter(s) Swish and Swallow four times a day  fluconAZOLE   Tablet 100 milliGRAM(s) Oral every 24 hours  heparin   Injectable 5000 Unit(s) SubCutaneous every 12 hours  midodrine 5 milliGRAM(s) Oral every 8 hours  mirtazapine 15 milliGRAM(s) Oral at bedtime  sodium chloride 0.9%. 1000 milliLiter(s) (50 mL/Hr) IV Continuous <Continuous>    MEDICATIONS  (PRN):  acetaminophen     Tablet .. 650 milliGRAM(s) Oral every 6 hours PRN Moderate Pain (4 - 6)  polyethylene glycol 3350 17 Gram(s) Oral daily PRN Constipation  senna 2 Tablet(s) Oral at bedtime PRN Constipation      CAPILLARY BLOOD GLUCOSE        I&O's Summary    09 Sep 2023 07:01  -  10 Sep 2023 07:00  --------------------------------------------------------  IN: 750 mL / OUT: 450 mL / NET: 300 mL        PHYSICAL EXAM:  Vital Signs Last 24 Hrs  T(C): 36.7 (11 Sep 2023 05:50), Max: 36.7 (10 Sep 2023 21:39)  T(F): 98.1 (11 Sep 2023 05:50), Max: 98.1 (10 Sep 2023 21:39)  HR: 48 (11 Sep 2023 05:50) (47 - 50)  BP: 113/62 (11 Sep 2023 05:50) (100/60 - 115/58)  BP(mean): --  RR: 17 (11 Sep 2023 05:50) (16 - 18)  SpO2: 100% (11 Sep 2023 05:50) (97% - 100%)    Parameters below as of 11 Sep 2023 05:50  Patient On (Oxygen Delivery Method): room air    General : NAD, Cachectic   CV: RRR no R/M/G  Lungs: CTAB  Abd : Soft, non-tender, tenderness RUQ, visible superficial veins  Extremities : 1+ B/L edema   Neuro : A&Ox3    LABS:                        10.8   10.44 )-----------( 240      ( 09 Sep 2023 07:40 )             32.8     09-09    138  |  98  |  64<H>  ----------------------------<  89  4.5   |  24  |  2.28<H>    Ca    10.0      09 Sep 2023 07:40  Phos  2.9     09-09  Mg     2.20     09-09    TPro  6.6  /  Alb  3.7  /  TBili  6.9<H>  /  DBili  x   /  AST  490<H>  /  ALT  97<H>  /  AlkPhos  651<H>  09-09          Urinalysis Basic - ( 09 Sep 2023 07:40 )    Color: x / Appearance: x / SG: x / pH: x  Gluc: 89 mg/dL / Ketone: x  / Bili: x / Urobili: x   Blood: x / Protein: x / Nitrite: x   Leuk Esterase: x / RBC: x / WBC x   Sq Epi: x / Non Sq Epi: x / Bacteria: x          Medications (Standing)  MEDICATIONS  (STANDING):  allopurinol 100 milliGRAM(s) Oral daily  FIRST- Mouthwash  BLM 10 milliLiter(s) Swish and Swallow four times a day  fluconAZOLE   Tablet 100 milliGRAM(s) Oral every 24 hours  heparin   Injectable 5000 Unit(s) SubCutaneous every 12 hours  midodrine 5 milliGRAM(s) Oral every 8 hours  mirtazapine 15 milliGRAM(s) Oral at bedtime  sodium chloride 0.9%. 1000 milliLiter(s) (50 mL/Hr) IV Continuous <Continuous>        COORDINATION OF CARE:  Care Discussed with Consultants/Other Providers [Y/N]:  Prior or Outpatient Records Reviewed [Y/N]:

## 2023-09-11 NOTE — PROGRESS NOTE ADULT - PROBLEM SELECTOR PLAN 1
- ISO newly found lesions (presumably cancer) to liver and pelvic bones, likely lungs   - Severe generalized weakness, pt was scared to go home following IR biopsy 8/29, sent straight to [facility]. Minimal appetite, fatigue, unintentional weight loss. Denies dysphagia or odynophagia but difficulty initiating swallow and feels like food gets "stuck at the top of stomach", leading him to feel full easily and eat minimally. Says that intermittent abd pain is UNRELATED to food; avoiding food more so because of his early satiety.  - Speech + swallow eval with cineesophagram - recommending minced and moist with mildly thick liquids 2/2 silent aspiration. Pt strongly requesting regular diet despite this rec due to poor taste/dislike for M+M diet, expresses understanding of risks but emphasizes priority is increasing caloric intake.  - Considered barium esophagram + small bowel series however was deemed poor option 2/2 aspiration and pt's inability to drink large volumes of liquid at once  - Discussion w/ family  NGT carries risk of aspiration, would be poor choice for pt at this time and he cannot go home with it; d/c is goal so onc workup can be continued.   - Esophageal Plaques found on ERCP 9/6 - Started on empiric Fluconazole / Magic Mouth wash   - PT eval --> rec home PT.  - patient requesting clear liquid diet at this time; open to switching back to regular and mod thick liquids later if he cannot tolerate.

## 2023-09-11 NOTE — PROGRESS NOTE ADULT - PROBLEM SELECTOR PLAN 6
Diet: Recommendation was minced and moist with mildly thickened liquids + Ensure, however 9/3 pt strongly requesting regular diet due to taste/texture preference and goal to attempt to gain weight and increase caloric intake. Risks discussed, pt expressed understanding but still prefers regular diet.    DVT ppx: SubQ heparin   Code Status: Extensive GOC discussion with pt at bedside. Says he has a lot to live for including family, grandkids and wants "everything done to keep him alive" including CPR, intubation, and pressors. Palliative made aware of pt but not formally consulted at this time. Likely will be more useful once primary diagnosis established and prognosis is more clear; pt's pain is intermittent well managed at this time   Dispo: D/c to Skilled Nursing family [facility] in [place] - with hospice. Patient does not desire additional medical management of metastatic cancer.

## 2023-09-11 NOTE — CHART NOTE - NSCHARTNOTEFT_GEN_A_CORE
Source: Patient [x ]    Family [ ]     other [ x] Son, chart review    Patient seen for severe malnutrition f/u. 83 year old male with a PMH of HTN, BPH, recent findings of metastatic cancer to lung, liver, and pelvic bone w/ unclear primary source at this point, present with severe generalized weakness ISO electrolyte derangements, pending hospice per chart.    Patient now on regular as per patient preference per chart review. Consumed a little of breakfast this morning. Intakes are 0-50% per RN flow sheet. On Remeron per chart. Noted w/ multiple Orgain bottles at bedside, encouraged consumption. No food preferences. Reports diarrhea. States swallowing has been improving. Noted w/ +3 R/L foot + ankle edema and no pressure injuries per RN flow sheet.    Diet : Diet, Regular:   Mildly Thick Liquids (MILDTHICKLIQS) (09-11-23 @ 08:57)    Current Weight: Weight (kg): no new weight to assess  48 kg (9/6)  48.4 kg (8/29)    Pertinent Medications: MEDICATIONS  (STANDING):  allopurinol 100 milliGRAM(s) Oral daily  FIRST- Mouthwash  BLM 10 milliLiter(s) Swish and Swallow four times a day  fluconAZOLE   Tablet 100 milliGRAM(s) Oral every 24 hours  heparin   Injectable 5000 Unit(s) SubCutaneous every 12 hours  midodrine 5 milliGRAM(s) Oral every 8 hours  mirtazapine 15 milliGRAM(s) Oral at bedtime  sodium chloride 0.9%. 1000 milliLiter(s) (50 mL/Hr) IV Continuous <Continuous>    MEDICATIONS  (PRN):  acetaminophen     Tablet .. 650 milliGRAM(s) Oral every 6 hours PRN Moderate Pain (4 - 6)  polyethylene glycol 3350 17 Gram(s) Oral daily PRN Constipation  senna 2 Tablet(s) Oral at bedtime PRN Constipation    Pertinent Labs: no new labs to assess    Estimated Needs:   [ x] no change since previous assessment  [ ] recalculated:     Previous Nutrition Diagnosis: Severe malnutrition    Nutrition Diagnosis is [x ] ongoing  [ ] resolved [ ] not applicable     Education:    [  ] Given today    Type of education provided:    [  ] Given on previous assessment by RD    [  ] Not applicable 2/2 cognitive deficit    [  ] Pt refusal of education offered    [  ] Not applicable 2/2 current prognosis    [x  ] Not warranted at present    Recommend  - continue w/ regular diet, defer consistencies to team  - nutrition department will continue to provide Orgain TID (660 kcal, 48 g pro). Will add magic cup BID (580 kcal, 18 g pro)  - monitor PO intake    Monitoring and Evaluation:     [x ] PO intake [ x] Tolerance to diet prescription [x ] weights [x ] follow up per protocol    Jose Mcdaniel, [id number] or TEAMS

## 2023-09-11 NOTE — PROGRESS NOTE ADULT - ASSESSMENT
Pt is an 83 y.o. M w/ PMHx HTN, BPH, recent findings of metastatic cancer to lung, liver, and pelvic bone w/ unclear primary source at this point, present with severe generalized weakness ISO electrolyte derangements including hyponatremia and hyperkalemia along w/ GREG, most likely due to pre-renal cause secondary to poor po intake. 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 s/p ERCP with biliary stent on 9/6, bili trending down but now rising again. Currently with poor nutritions and worsening of GREG.   Patient continues to have fatigue, poor appetite without n/v. Defered inpatient chemo, Pending Hospice, agreed to minimize lab draws.  Pt is an 83 y.o. M w/ PMHx HTN, BPH, recent findings of metastatic cancer to lung, liver, and pelvic bone w/ unclear primary source at this point, present with severe generalized weakness ISO electrolyte derangements including hyponatremia and hyperkalemia along w/ GREG, most likely due to pre-renal cause secondary to poor po intake. 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 s/p ERCP with biliary stent on 9/6, bili trending down but now rising again. Currently with poor nutritions and worsening of GREG.   Patient continues to have fatigue, poor appetite without n/v. Deferred inpatient chemo, Pending Hospice, agreed to minimize lab draws.

## 2023-09-11 NOTE — PROVIDER CONTACT NOTE (OTHER) - ASSESSMENT
Ear Complaints





- HISTORIAN


Historian: parent





- HPI


Chief Complaint: Ear Complaints


Timing: still present


Associated Symptoms: fever (low grade)


Further Comments: yes (Cough for the last four days. Still has cough, mild 

runny nose. Hs been pulling at the left ear. Has had threee febrile seizures in 

the past. Appetite has been diminished.)





- ROS


CONST: no problems





- PAST HX


Past History: frequent ear infections


Immunizations: UTD


Allergies/Adverse Reactions: 


 Allergies











Allergy/AdvReac Type Severity Reaction Status Date / Time


 


No Known Drug Allergies Allergy   Verified 08/15/17 08:33














Home Medications: 


 Ambulatory Orders











 Medication  Instructions  Recorded


 


NK [NK]  08/15/17














- SOCIAL HX


Smoking History: non-smoker, secondhand


Alcohol Use: none


Drug Use: none





- FAMILY HX


Family History: Yes





- VITAL SIGNS


Vital Signs: 





 Vital Signs











Temp Pulse Resp BP Pulse Ox


 


 99.1 F   102   20      99 


 


 08/15/17 08:16  08/15/17 08:16  08/15/17 08:16     08/15/17 08:16














- REVIEWED ASSESSMENTS


Nursing Assessment  Reviewed: Yes


Vitals Reviewed: Yes





Ear Complaint Physical Exam





- EXAM


General Appearance: no acute distress, alert


Ear: auricle nml, extern.canal nml, cerumen (mild)


Mouth/Throat: lips nml, gums nml, pharynx nml


Nose: other (mild clear drainage)


Head/Neck: neck nml inspection.  No: cervical lymphadenopathy


Resp/CVS: chest non-tender, breath sounds nml, heart sounds nml, no resp. 

distress, lungs clear, reg. rate & rhythm


Abdomen: non-tender, no organomegaly


Skin: nml color, no skin rash


Neuro/Psych: mood/affect nml





Discharge


Clincal Impression: 


 Viral URI





Referrals: 


Brent Toth MD [Primary Care Provider] - 2 Days


Additional Instructions: 


Encourage fluids. Gives some Tylenol or ibuprofen for her fever. Follow up with 

Dr Victor if not improved in the next 5 days if not improving.  


Home Medications: 


Ambulatory Orders





NK [NK]  08/15/17 








Condition: Stable


Disposition: 01 HOME, SELF-CARE


Decision to Admit: NO


Date of Decison to Admit: 08/15/17


Decision Time: 08:40 pt is a&o4, no acute stress noted, pt. denied headache and dizziness. vs- pt hr is 48, temp 98.1, bp-113/62, rr-17, o2 100

## 2023-09-11 NOTE — PROGRESS NOTE ADULT - PROBLEM SELECTOR PLAN 2
- Known liver mets (vs primary tumor?) s/p biopsy with IR outpatient 8/29. Will f/u results - onc will email to expedite results   - CTAP on admission w/ hepatomegaly with presumed innumerable hepatic metastatic lesions, trace ascites, mild anasarca  - TBili continuing to increase at an increasing rate, 5.5 on 9/5 up from 1.0 at beginning of August.   - Oncology consulted: Elevated CEA (164), d-dimer (5418), LDH (621). Normal PSA, CA9-19, AFP tumor marker, Hepatitis non-reactive.  - Concern for possible biliary obstruction 2/2 rapidly elevated TBili and AlkPhos compared to earlier this month; MRCP w/ slight compression of CBD by liver. Pt w/o Sx of hyperbilirubinemia.   - ERCP with stent placement on 9/6 - downtrending bili intially but currently 9/10 rising - plan to f/u with GI   - IR Biopsy - Undifferentiated Neuroendocrine tumor - Pt has initial consultation with Dr. Milagros Sol at [facility] Thurs. 9/7 at 11:30Am  - 9/8 - After d/w with oncology, patient would like to defer inpatient chemo.   As of 9/8 - Pt and family does not want additional medical treatment for metastatic cancer. - Known liver mets (vs primary tumor?) s/p biopsy with IR outpatient 8/29. Will f/u results - onc will email to expedite results   - CTAP on admission w/ hepatomegaly with presumed innumerable hepatic metastatic lesions, trace ascites, mild anasarca  - TBili continuing to increase at an increasing rate, 5.5 on 9/5 up from 1.0 at beginning of August.   - Oncology consulted: Elevated CEA (164), d-dimer (5418), LDH (621). Normal PSA, CA9-19, AFP tumor marker, Hepatitis non-reactive.  - Concern for possible biliary obstruction 2/2 rapidly elevated TBili and AlkPhos compared to earlier this month; MRCP w/ slight compression of CBD by liver. Pt w/o Sx of hyperbilirubinemia.   - ERCP with stent placement on 9/6 - downtrending bili initially but currently 9/10 rising - Agreed with patient to stop checking labs, if patient is asymptomatic, will hold off asking GI for interventions.   - IR Biopsy - Undifferentiated Neuroendocrine tumor - [facility]  - 9/8 - After d/w with oncology, patient would like to defer inpatient chemo.   As of 9/8 - Pt and family does not want additional medical treatment for metastatic cancer.

## 2023-09-12 PROCEDURE — 99232 SBSQ HOSP IP/OBS MODERATE 35: CPT | Mod: GC

## 2023-09-12 RX ADMIN — Medication 100 MILLIGRAM(S): at 13:30

## 2023-09-12 RX ADMIN — DIPHENHYDRAMINE HYDROCHLORIDE AND LIDOCAINE HYDROCHLORIDE AND ALUMINUM HYDROXIDE AND MAGNESIUM HYDRO 10 MILLILITER(S): KIT at 13:30

## 2023-09-12 RX ADMIN — MIDODRINE HYDROCHLORIDE 5 MILLIGRAM(S): 2.5 TABLET ORAL at 06:11

## 2023-09-12 RX ADMIN — FLUCONAZOLE 100 MILLIGRAM(S): 150 TABLET ORAL at 13:31

## 2023-09-12 RX ADMIN — MIDODRINE HYDROCHLORIDE 5 MILLIGRAM(S): 2.5 TABLET ORAL at 13:31

## 2023-09-12 RX ADMIN — HEPARIN SODIUM 5000 UNIT(S): 5000 INJECTION INTRAVENOUS; SUBCUTANEOUS at 06:12

## 2023-09-12 RX ADMIN — POLYETHYLENE GLYCOL 3350 17 GRAM(S): 17 POWDER, FOR SOLUTION ORAL at 13:32

## 2023-09-12 RX ADMIN — HEPARIN SODIUM 5000 UNIT(S): 5000 INJECTION INTRAVENOUS; SUBCUTANEOUS at 18:23

## 2023-09-12 RX ADMIN — MIDODRINE HYDROCHLORIDE 5 MILLIGRAM(S): 2.5 TABLET ORAL at 23:18

## 2023-09-12 NOTE — PROVIDER CONTACT NOTE (OTHER) - BACKGROUND
Patient admitted for hepatomegaly.   PMH of HTN, HLD, BPH.
Patient admitted for weakness.
pt admitted for hepatomegaly. hx of unilateral inguinal hernia w/o obstruction, htn, greg
pt admitted for hepatomegaly. hx of unilateral inguinal hernia w/o obstruction, htn, greg
Pt admited for heptomegaly . PMH HTN, HLD, BPH, liver lesions
Patient admitted for Hepatomegaly, with pmh of metastatic lesions in liver, pelvis ,and lungs, BPH, and HTN.
patient admitted for hepatomegaly, failure to thrive
pt admitted for hepatomegaly. hx of unilateral inguinal hernia w/o obstruction, htn,GREG
Patient admitted for Hepatomegaly, with pmh of metastatic lesions in liver, pelvis ,and lungs, BPH, and HTN.
Patient admitted for Hepatomegaly.   PMH of HTN, HLD, BPH.
pt admitted for hepatomegaly. hx of unilateral inguinal hernia w/o obstruction, htn, greg

## 2023-09-12 NOTE — PROVIDER CONTACT NOTE (OTHER) - DATE AND TIME:
10-Sep-2023 12:17
03-Sep-2023 23:15
10-Sep-2023 21:56
11-Sep-2023 12:32
05-Sep-2023 19:00
07-Sep-2023 20:44
07-Sep-2023 22:34
11-Sep-2023 00:12
11-Sep-2023 06:22
12-Sep-2023 19:45
12-Sep-2023 21:43

## 2023-09-12 NOTE — PROVIDER CONTACT NOTE (OTHER) - RECOMMENDATIONS
Continue current IV fluids
continue to monitor
notify MD
Notify MD.
Notify MD.
Ongoing monitoring.
continue to monitor
Continue current IV fluids
MD notified of situations. No interventions at this time. Nursing care continued

## 2023-09-12 NOTE — PROVIDER CONTACT NOTE (OTHER) - ACTION/TREATMENT ORDERED:
MD notified,will continue to monitor pt for urinary retention. MD notified, will continue to monitor pt for urinary retention.

## 2023-09-12 NOTE — PROGRESS NOTE ADULT - PROBLEM SELECTOR PLAN 1
- ISO newly found lesions (presumably cancer) to liver and pelvic bones, likely lungs   - Severe generalized weakness, pt was scared to go home following IR biopsy 8/29, sent straight to [facility]. Minimal appetite, fatigue, unintentional weight loss. Denies dysphagia or odynophagia but difficulty initiating swallow and feels like food gets "stuck at the top of stomach", leading him to feel full easily and eat minimally. Says that intermittent abd pain is UNRELATED to food; avoiding food more so because of his early satiety.  - Speech + swallow eval with cineesophagram - recommending minced and moist with mildly thick liquids 2/2 silent aspiration. Pt strongly requesting regular diet despite this rec due to poor taste/dislike for M+M diet, expresses understanding of risks but emphasizes priority is increasing caloric intake.  - Considered barium esophagram + small bowel series however was deemed poor option 2/2 aspiration and pt's inability to drink large volumes of liquid at once  - Discussion w/ family  NGT carries risk of aspiration, would be poor choice for pt at this time and he cannot go home with it; d/c is goal so onc workup can be continued.   - Esophageal Plaques found on ERCP 9/6 - Started on empiric Fluconazole / Magic Mouth wash   - PT eval --> rec home PT.  - patient requesting clear liquid diet at this time; open to switching back to regular and mod thick liquids later if he cannot tolerate.

## 2023-09-12 NOTE — PROGRESS NOTE ADULT - ATTENDING COMMENTS
82 yo gentlemen w/ PMHx HTN, BPH, recent findings of metastatic cancer to lung, liver, and pelvic bone w/ unclear primary source at this point, present with severe generalized weakness c/b electrolyte derangements including hyponatremia and hyperkalemia along w/ GREG, most likely due to pre-renal cause secondary to poor po intake.    Patient biopsy with neuroendocrine cancer/small cell, electing to palliatively manage after discussion with oncology.   GREG to prerenal improved, though then developed GREG 2/2 ATN as a result from hypotensive episode  Continue the rest of the work up and management as stated above.

## 2023-09-12 NOTE — PROGRESS NOTE ADULT - SUBJECTIVE AND OBJECTIVE BOX
***************************************************************  Marco A Melo  (PGY1) Internal Medicine  On TEAMS  ***************************************************************    PROGRESS NOTE:     Patient is a 83y old  Male who presents with a chief complaint of Hyperkalemia, GREG (11 Sep 2023 06:25)      SUBJECTIVE / OVERNIGHT EVENTS:      MEDICATIONS  (STANDING):  allopurinol 100 milliGRAM(s) Oral daily  FIRST- Mouthwash  BLM 10 milliLiter(s) Swish and Swallow four times a day  fluconAZOLE   Tablet 100 milliGRAM(s) Oral every 24 hours  heparin   Injectable 5000 Unit(s) SubCutaneous every 12 hours  lactated ringers. 1000 milliLiter(s) (50 mL/Hr) IV Continuous <Continuous>  midodrine 5 milliGRAM(s) Oral every 8 hours  mirtazapine 15 milliGRAM(s) Oral at bedtime  sodium chloride 0.9%. 1000 milliLiter(s) (50 mL/Hr) IV Continuous <Continuous>    MEDICATIONS  (PRN):  acetaminophen     Tablet .. 650 milliGRAM(s) Oral every 6 hours PRN Moderate Pain (4 - 6)  polyethylene glycol 3350 17 Gram(s) Oral daily PRN Constipation  senna 2 Tablet(s) Oral at bedtime PRN Constipation      CAPILLARY BLOOD GLUCOSE        I&O's Summary      PHYSICAL EXAM:  Vital Signs Last 24 Hrs  T(C): 36.4 (12 Sep 2023 05:40), Max: 36.4 (12 Sep 2023 05:40)  T(F): 97.5 (12 Sep 2023 05:40), Max: 97.5 (12 Sep 2023 05:40)  HR: 58 (12 Sep 2023 05:40) (51 - 58)  BP: 125/78 (12 Sep 2023 05:40) (98/58 - 127/74)  BP(mean): --  RR: 18 (12 Sep 2023 05:40) (17 - 18)  SpO2: 99% (12 Sep 2023 05:40) (99% - 100%)    Parameters below as of 12 Sep 2023 05:40  Patient On (Oxygen Delivery Method): room air        General : NAD, Cachectic   CV: RRR no R/M/G  Lungs: CTAB  Abd : Soft, non-tender, tenderness RUQ, visible superficial veins  Extremities : 1+ B/L edema   Neuro : A&Ox3    LABS:     Medications (Standing)  MEDICATIONS  (STANDING):  allopurinol 100 milliGRAM(s) Oral daily  FIRST- Mouthwash  BLM 10 milliLiter(s) Swish and Swallow four times a day  fluconAZOLE   Tablet 100 milliGRAM(s) Oral every 24 hours  heparin   Injectable 5000 Unit(s) SubCutaneous every 12 hours  lactated ringers. 1000 milliLiter(s) (50 mL/Hr) IV Continuous <Continuous>  midodrine 5 milliGRAM(s) Oral every 8 hours  mirtazapine 15 milliGRAM(s) Oral at bedtime  sodium chloride 0.9%. 1000 milliLiter(s) (50 mL/Hr) IV Continuous <Continuous>        COORDINATION OF CARE:  Care Discussed with Consultants/Other Providers [Y/N]:  Prior or Outpatient Records Reviewed [Y/N]:   ***************************************************************  Marco A Melo  (PGY1) Internal Medicine  On TEAMS  ***************************************************************    PROGRESS NOTE:     Patient is a 83y old  Male who presents with a chief complaint of Hyperkalemia, GREG (11 Sep 2023 06:25)      SUBJECTIVE / OVERNIGHT EVENTS: Able to tolerate solid food. Urinating appropriately, BM yesterday. Patient denies fever, chills, nausea, vomiting, chest pain, shortness of breath, abdominal pain, diarrhea or constipation.       MEDICATIONS  (STANDING):  allopurinol 100 milliGRAM(s) Oral daily  FIRST- Mouthwash  BLM 10 milliLiter(s) Swish and Swallow four times a day  fluconAZOLE   Tablet 100 milliGRAM(s) Oral every 24 hours  heparin   Injectable 5000 Unit(s) SubCutaneous every 12 hours  lactated ringers. 1000 milliLiter(s) (50 mL/Hr) IV Continuous <Continuous>  midodrine 5 milliGRAM(s) Oral every 8 hours  mirtazapine 15 milliGRAM(s) Oral at bedtime  sodium chloride 0.9%. 1000 milliLiter(s) (50 mL/Hr) IV Continuous <Continuous>    MEDICATIONS  (PRN):  acetaminophen     Tablet .. 650 milliGRAM(s) Oral every 6 hours PRN Moderate Pain (4 - 6)  polyethylene glycol 3350 17 Gram(s) Oral daily PRN Constipation  senna 2 Tablet(s) Oral at bedtime PRN Constipation      CAPILLARY BLOOD GLUCOSE        I&O's Summary      PHYSICAL EXAM:  Vital Signs Last 24 Hrs  T(C): 36.4 (12 Sep 2023 05:40), Max: 36.4 (12 Sep 2023 05:40)  T(F): 97.5 (12 Sep 2023 05:40), Max: 97.5 (12 Sep 2023 05:40)  HR: 58 (12 Sep 2023 05:40) (51 - 58)  BP: 125/78 (12 Sep 2023 05:40) (98/58 - 127/74)  BP(mean): --  RR: 18 (12 Sep 2023 05:40) (17 - 18)  SpO2: 99% (12 Sep 2023 05:40) (99% - 100%)    Parameters below as of 12 Sep 2023 05:40  Patient On (Oxygen Delivery Method): room air        General : NAD, Cachectic   CV: RRR no R/M/G  Lungs: CTAB  Abd : Soft, non-tender, tenderness RUQ, visible superficial veins  Extremities : 1+ B/L edema   Neuro : A&Ox3    LABS:     Medications (Standing)  MEDICATIONS  (STANDING):  allopurinol 100 milliGRAM(s) Oral daily  FIRST- Mouthwash  BLM 10 milliLiter(s) Swish and Swallow four times a day  fluconAZOLE   Tablet 100 milliGRAM(s) Oral every 24 hours  heparin   Injectable 5000 Unit(s) SubCutaneous every 12 hours  lactated ringers. 1000 milliLiter(s) (50 mL/Hr) IV Continuous <Continuous>  midodrine 5 milliGRAM(s) Oral every 8 hours  mirtazapine 15 milliGRAM(s) Oral at bedtime  sodium chloride 0.9%. 1000 milliLiter(s) (50 mL/Hr) IV Continuous <Continuous>        COORDINATION OF CARE:  Care Discussed with Consultants/Other Providers [Y/N]:  Prior or Outpatient Records Reviewed [Y/N]:

## 2023-09-12 NOTE — PROGRESS NOTE ADULT - PROBLEM SELECTOR PLAN 6
Diet: Recommendation was minced and moist with mildly thickened liquids + Ensure, however 9/3 pt strongly requesting regular diet due to taste/texture preference and goal to attempt to gain weight and increase caloric intake. Risks discussed, pt expressed understanding but still prefers regular diet.    DVT ppx: SubQ heparin   Code Status: Extensive GOC discussion with pt at bedside. Says he has a lot to live for including family, grandkids and wants "everything done to keep him alive" including CPR, intubation, and pressors. Palliative made aware of pt but not formally consulted at this time. Likely will be more useful once primary diagnosis established and prognosis is more clear; pt's pain is intermittent well managed at this time   Dispo: D/c to [facility]illed Nursing family [facility] in [place] - with hospice. Patient does not desire additional medical management of metastatic cancer. Diet: Recommendation was minced and moist with mildly thickened liquids + Ensure, however 9/3 pt strongly requesting regular diet due to taste/texture preference and goal to attempt to gain weight and increase caloric intake. Risks discussed, pt expressed understanding but still prefers regular diet.    DVT ppx: SubQ heparin   Code Status: Extensive GOC discussion with pt at bedside. Says he has a lot to live for including family, grandkids and wants "everything done to keep him alive" including CPR, intubation, and pressors. Palliative made aware of pt but not formally consulted at this time. Likely will be more useful once primary diagnosis established and prognosis is more clear; pt's pain is intermittent well managed at this time   Dispo: D/c to Skilled Nursing family [facility] in [place] w/ hospice services . Patient does not desire additional medical management of metastatic cancer.

## 2023-09-12 NOTE — PROVIDER CONTACT NOTE (OTHER) - SITUATION
BP: 89/62
BP:92/51
patient's HR 50
heart rate50 below 48
Patient c/o difficulty urinating, Bladder scan 290ml.
heart rate50 below 60
HR =51 and patient denies symptoms.
Pt refusing medications
HR 51, BP 98/58
bilateral swelling noted on patient's lower extremities.
heart rate 48

## 2023-09-12 NOTE — PROVIDER CONTACT NOTE (OTHER) - ASSESSMENT
pt is a&o4, no acute stress noted. pt is a&o4, no acute stress noted, abdomen soft and non distended.

## 2023-09-12 NOTE — PROGRESS NOTE ADULT - PROBLEM SELECTOR PLAN 2
- Known liver mets (vs primary tumor?) s/p biopsy with IR outpatient 8/29. Will f/u results - onc will email to expedite results   - CTAP on admission w/ hepatomegaly with presumed innumerable hepatic metastatic lesions, trace ascites, mild anasarca  - TBili continuing to increase at an increasing rate, 5.5 on 9/5 up from 1.0 at beginning of August.   - Oncology consulted: Elevated CEA (164), d-dimer (5418), LDH (621). Normal PSA, CA9-19, AFP tumor marker, Hepatitis non-reactive.  - Concern for possible biliary obstruction 2/2 rapidly elevated TBili and AlkPhos compared to earlier this month; MRCP w/ slight compression of CBD by liver. Pt w/o Sx of hyperbilirubinemia.   - ERCP with stent placement on 9/6 - downtrending bili initially but currently 9/10 rising - Agreed with patient to stop checking labs, if patient is asymptomatic, will hold off asking GI for interventions.   - IR Biopsy - Undifferentiated Neuroendocrine tumor - [facility]  - 9/8 - After d/w with oncology, patient would like to defer inpatient chemo.   As of 9/8 - Pt and family does not want additional medical treatment for metastatic cancer.

## 2023-09-12 NOTE — PROGRESS NOTE ADULT - ASSESSMENT
Pt is an 83 y.o. M w/ PMHx HTN, BPH, recent findings of metastatic cancer to lung, liver, and pelvic bone w/ unclear primary source at this point, present with severe generalized weakness ISO electrolyte derangements including hyponatremia and hyperkalemia along w/ GREG, most likely due to pre-renal cause secondary to poor po intake. 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 s/p ERCP with biliary stent on 9/6, bili trending down but now rising again. Currently with poor nutritions and worsening of GREG.   Patient continues to have fatigue, poor appetite without n/v. Deferred inpatient chemo, Pending Hospice, agreed to minimize lab draws.  Pt is an 83 y.o. M w/ PMHx HTN, BPH, recent findings of metastatic cancer to lung, liver, and pelvic bone w/ unclear primary source at this point, present with severe generalized weakness ISO electrolyte derangements including hyponatremia and hyperkalemia along w/ GREG, most likely due to pre-renal cause secondary to poor po intake. 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 s/p ERCP with biliary stent on 9/6, bili trending down but now rising again. Currently with poor nutritions and worsening of GREG.   Patient continues to have fatigue, poor appetite without n/v. Deferred inpatient chemo, Pending skilled nursing facility with Hospice services, agreed to minimize lab draws.

## 2023-09-12 NOTE — PROVIDER CONTACT NOTE (OTHER) - NAME OF MD/NP/PA/DO NOTIFIED:
MD Banks, Winnie
Dr Berrios
MD Banks, Winnie
MD Marco A Melo
MD Randy Arenas
Marco A villalpando MD
Winnie Banks
MD, Jocelyn Zhou
MD Banks, Winnie
MD Banks, Winnie notified.
MD, Jocelyn Zhou

## 2023-09-13 ENCOUNTER — TRANSCRIPTION ENCOUNTER (OUTPATIENT)
Age: 83
End: 2023-09-13

## 2023-09-13 PROCEDURE — 99239 HOSP IP/OBS DSCHRG MGMT >30: CPT | Mod: GC

## 2023-09-13 RX ORDER — SODIUM CHLORIDE 9 MG/ML
1000 INJECTION, SOLUTION INTRAVENOUS
Refills: 0 | Status: DISCONTINUED | OUTPATIENT
Start: 2023-09-13 | End: 2023-09-14

## 2023-09-13 RX ORDER — FLUCONAZOLE 150 MG/1
1 TABLET ORAL
Qty: 7 | Refills: 0
Start: 2023-09-13 | End: 2023-09-19

## 2023-09-13 RX ORDER — SENNA PLUS 8.6 MG/1
2 TABLET ORAL
Qty: 0 | Refills: 0 | DISCHARGE
Start: 2023-09-13

## 2023-09-13 RX ORDER — MIRTAZAPINE 45 MG/1
1 TABLET, ORALLY DISINTEGRATING ORAL
Qty: 0 | Refills: 0 | DISCHARGE
Start: 2023-09-13

## 2023-09-13 RX ORDER — SIMETHICONE 80 MG/1
80 TABLET, CHEWABLE ORAL THREE TIMES A DAY
Refills: 0 | Status: DISCONTINUED | OUTPATIENT
Start: 2023-09-13 | End: 2023-09-14

## 2023-09-13 RX ORDER — DUTASTERIDE 0.5 MG/1
1 CAPSULE, LIQUID FILLED ORAL
Qty: 30 | Refills: 0
Start: 2023-09-13 | End: 2023-10-12

## 2023-09-13 RX ADMIN — SODIUM CHLORIDE 50 MILLILITER(S): 9 INJECTION, SOLUTION INTRAVENOUS at 13:10

## 2023-09-13 RX ADMIN — HEPARIN SODIUM 5000 UNIT(S): 5000 INJECTION INTRAVENOUS; SUBCUTANEOUS at 05:37

## 2023-09-13 RX ADMIN — Medication 100 MILLIGRAM(S): at 11:48

## 2023-09-13 RX ADMIN — SIMETHICONE 80 MILLIGRAM(S): 80 TABLET, CHEWABLE ORAL at 14:57

## 2023-09-13 RX ADMIN — DIPHENHYDRAMINE HYDROCHLORIDE AND LIDOCAINE HYDROCHLORIDE AND ALUMINUM HYDROXIDE AND MAGNESIUM HYDRO 10 MILLILITER(S): KIT at 13:12

## 2023-09-13 RX ADMIN — FLUCONAZOLE 100 MILLIGRAM(S): 150 TABLET ORAL at 14:57

## 2023-09-13 RX ADMIN — MIDODRINE HYDROCHLORIDE 5 MILLIGRAM(S): 2.5 TABLET ORAL at 06:51

## 2023-09-13 RX ADMIN — MIDODRINE HYDROCHLORIDE 5 MILLIGRAM(S): 2.5 TABLET ORAL at 13:11

## 2023-09-13 NOTE — PROGRESS NOTE ADULT - PROBLEM SELECTOR PLAN 2
- Known liver mets (vs primary tumor?) s/p biopsy with IR outpatient 8/29. Will f/u results - onc will email to expedite results   - CTAP on admission w/ hepatomegaly with presumed innumerable hepatic metastatic lesions, trace ascites, mild anasarca  - TBili continuing to increase at an increasing rate, 5.5 on 9/5 up from 1.0 at beginning of August.   - Oncology consulted: Elevated CEA (164), d-dimer (5418), LDH (621). Normal PSA, CA9-19, AFP tumor marker, Hepatitis non-reactive.  - Concern for possible biliary obstruction 2/2 rapidly elevated TBili and AlkPhos compared to earlier this month; MRCP w/ slight compression of CBD by liver. Pt w/o Sx of hyperbilirubinemia.   - ERCP with stent placement on 9/6 - downtrending bili initially but currently 9/10 rising - Agreed with patient to stop checking labs, if patient is asymptomatic, will hold off asking GI for interventions.   - IR Biopsy - Undifferentiated Neuroendocrine tumor - [facility] 11:30Am  - 9/8 - After d/w with oncology, patient would like to defer inpatient chemo.   As of 9/8 - Pt and family does not want additional medical treatment for metastatic cancer.

## 2023-09-13 NOTE — PROGRESS NOTE ADULT - PROBLEM SELECTOR PROBLEM 1
Adult failure to thrive
GREG (acute kidney injury)
Adult failure to thrive

## 2023-09-13 NOTE — PROGRESS NOTE ADULT - PROBLEM SELECTOR PROBLEM 3
GREG (acute kidney injury)

## 2023-09-13 NOTE — PROGRESS NOTE ADULT - PROBLEM SELECTOR PLAN 1
- ISO newly found lesions (presumably cancer) to liver and pelvic bones, likely lungs   - Severe generalized weakness, pt was scared to go home following IR biopsy 8/29, sent straight to [facility]. Minimal appetite, fatigue, unintentional weight loss. Denies dysphagia or odynophagia but difficulty initiating swallow and feels like food gets "stuck at the top of stomach", leading him to feel full easily and eat minimally. Says that intermittent abd pain is UNRELATED to food; avoiding food more so because of his early satiety.  - Speech + swallow eval with cineesophagram - recommending minced and moist with mildly thick liquids 2/2 silent aspiration. Pt strongly requesting regular diet despite this rec due to poor taste/dislike for M+M diet, expresses understanding of risks but emphasizes priority is increasing caloric intake.  - Considered barium esophagram + small bowel series however was deemed poor option 2/2 aspiration and pt's inability to drink large volumes of liquid at once  - Discussion w/ family  NGT carries risk of aspiration, would be poor choice for pt at this time and he cannot go home with it; d/c is goal so onc workup can be continued.   - Esophageal Plaques found on ERCP 9/6 - Started on empiric Fluconazole / Magic Mouth wash   - PT eval --> rec home PT.  - patient requesting clear liquid diet at this time; open to switching back to regular and mod thick liquids later if he cannot tolerate.

## 2023-09-13 NOTE — PROGRESS NOTE ADULT - ATTENDING SUPERVISION STATEMENT
Fellow
Resident

## 2023-09-13 NOTE — PROGRESS NOTE ADULT - ATTENDING COMMENTS
84 yo gentlemen w/ PMHx HTN, BPH, recent findings of metastatic cancer to lung, liver, and pelvic bone w/ unclear primary source at this point, present with severe generalized weakness c/b electrolyte derangements including hyponatremia and hyperkalemia along w/ GREG, most likely due to pre-renal cause secondary to poor po intake.    Patient biopsy with neuroendocrine cancer/small cell, electing to palliatively management after discussion with oncology. Now has FTT, will expect quick functional decline and patient will benefit from LTC with transition to hospice.   GREG to prerenal improved, though then developed GREG 2/2 ATN as a result from hypotensive episode  Continue the rest of the work up and management as stated above.

## 2023-09-13 NOTE — PROGRESS NOTE ADULT - PROBLEM SELECTOR PLAN 6
Diet: Recommendation was minced and moist with mildly thickened liquids + Ensure, however 9/3 pt strongly requesting regular diet due to taste/texture preference and goal to attempt to gain weight and increase caloric intake. Risks discussed, pt expressed understanding but still prefers regular diet.    DVT ppx: SubQ heparin   Code Status: Extensive GOC discussion with pt at bedside. DNR with trial of intubation  Dispo: D/c to [facility] [place] w/ hospice services . Patient does not desire additional medical management of metastatic cancer.

## 2023-09-13 NOTE — PROGRESS NOTE ADULT - ASSESSMENT
Pt is an 83 y.o. M w/ PMHx HTN, BPH, recent findings of metastatic cancer to lung, liver, and pelvic bone w/ unclear primary source at this point, present with severe generalized weakness ISO electrolyte derangements including hyponatremia and hyperkalemia along w/ GREG, most likely due to pre-renal cause secondary to poor po intake. 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 s/p ERCP with biliary stent on 9/6, bili trending down but now rising again. Currently with poor nutritions and worsening of GREG.   Patient continues to have fatigue, poor appetite without n/v. Deferred inpatient chemo, Pending skilled nursing facility with Hospice services, Pt is an 83 y.o. M w/ PMHx HTN, BPH, recent findings of metastatic cancer to lung, liver, and pelvic bone w/ unclear primary source at this point, present with severe generalized weakness ISO electrolyte derangements including hyponatremia and hyperkalemia along w/ GREG, most likely due to pre-renal cause secondary to poor po intake. 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 s/p ERCP with biliary stent on 9/6, bili trending down but now rising again. Currently with poor nutritions and worsening of GREG.   Patient continues to have fatigue, poor appetite without n/v. Deferred inpatient chemo, Pending skilled nursing facility w/ hospice services.

## 2023-09-13 NOTE — DISCHARGE NOTE NURSING/CASE MANAGEMENT/SOCIAL WORK - PATIENT PORTAL LINK FT
You can access the FollowMyHealth Patient Portal offered by [facility] by registering at the following website: [email or web address]. By joining [facility]’s FollowMyHealth portal, you will also be able to view your health information using other applications (apps) compatible with our system.

## 2023-09-13 NOTE — PROGRESS NOTE ADULT - NUTRITIONAL ASSESSMENT
This patient has been assessed with a concern for Malnutrition and has been determined to have a diagnosis/diagnoses of Severe protein-calorie malnutrition and Underweight (BMI < 19).    This patient is being managed with:   Diet Regular-  Mildly Thick Liquids (MILDTHICKLIQS)  Entered: Sep  3 2023  9:02AM  
This patient has been assessed with a concern for Malnutrition and has been determined to have a diagnosis/diagnoses of Severe protein-calorie malnutrition and Underweight (BMI < 19).    This patient is being managed with:   Diet Soft and Bite Sized-  Mildly Thick Liquids (MILDTHICKLIQS)  Entered: Sep 10 2023 12:41PM  
This patient has been assessed with a concern for Malnutrition and has been determined to have a diagnosis/diagnoses of Severe protein-calorie malnutrition and Underweight (BMI < 19).    This patient is being managed with:   Diet Regular-  Mildly Thick Liquids (MILDTHICKLIQS)  Entered: Sep  3 2023  9:02AM  
This patient has been assessed with a concern for Malnutrition and has been determined to have a diagnosis/diagnoses of Severe protein-calorie malnutrition and Underweight (BMI < 19).    This patient is being managed with:   Diet Regular-  Mildly Thick Liquids (MILDTHICKLIQS)  Entered: Sep 11 2023  8:57AM  
This patient has been assessed with a concern for Malnutrition and has been determined to have a diagnosis/diagnoses of Severe protein-calorie malnutrition and Underweight (BMI < 19).    This patient is being managed with:   Diet Regular-  Mildly Thick Liquids (MILDTHICKLIQS)  Entered: Sep 11 2023  8:57AM  
This patient has been assessed with a concern for Malnutrition and has been determined to have a diagnosis/diagnoses of Severe protein-calorie malnutrition and Underweight (BMI < 19).    This patient is being managed with:   Diet Regular-  Minced and Moist (MINCEDMOIST)  Mildly Thick Liquids (MILDTHICKLIQS)  Entered: Sep  1 2023  3:17PM  
This patient has been assessed with a concern for Malnutrition and has been determined to have a diagnosis/diagnoses of Severe protein-calorie malnutrition and Underweight (BMI < 19).    This patient is being managed with:   Diet NPO after Midnight-     NPO Start Date: 05-Sep-2023   NPO Start Time: 23:59  Entered: Sep  5 2023  4:11PM    Diet Regular-  Mildly Thick Liquids (MILDTHICKLIQS)  Entered: Sep  3 2023  9:02AM  
This patient has been assessed with a concern for Malnutrition and has been determined to have a diagnosis/diagnoses of Severe protein-calorie malnutrition and Underweight (BMI < 19).    This patient is being managed with:   Diet Regular-  Minced and Moist (MINCEDMOIST)  Mildly Thick Liquids (MILDTHICKLIQS)  Entered: Sep  1 2023  3:17PM  
This patient has been assessed with a concern for Malnutrition and has been determined to have a diagnosis/diagnoses of Severe protein-calorie malnutrition and Underweight (BMI < 19).    This patient is being managed with:   Diet Clear Liquid-  Mildly Thick Liquids (MILDTHICKLIQS)  Entered: Sep  9 2023 11:32AM  
This patient has been assessed with a concern for Malnutrition and has been determined to have a diagnosis/diagnoses of Severe protein-calorie malnutrition and Underweight (BMI < 19).    This patient is being managed with:   Diet Regular-  Minced and Moist (MINCEDMOIST)  Mildly Thick Liquids (MILDTHICKLIQS)  Supplement Feeding Modality:  Oral  Ensure Enlive Cans or Servings Per Day:  2       Frequency:  Three Times a day  Entered: Aug 31 2023 11:05AM

## 2023-09-13 NOTE — PROGRESS NOTE ADULT - PROVIDER SPECIALTY LIST ADULT
Gastroenterology
Anesthesia
Anesthesia
Internal Medicine
Gastroenterology
Heme/Onc
Nephrology
Heme/Onc
Heme/Onc
Internal Medicine

## 2023-09-13 NOTE — DISCHARGE NOTE NURSING/CASE MANAGEMENT/SOCIAL WORK - NSDCPEFALRISK_GEN_ALL_CORE
For information on Fall & Injury Prevention, visit: [email or web address] OR  [email or web address] OR  https://www.cdc.gov/steadi/patient.html

## 2023-09-13 NOTE — PROGRESS NOTE ADULT - REASON FOR ADMISSION
Hyperkalemia, GREG

## 2023-09-13 NOTE — PROGRESS NOTE ADULT - SUBJECTIVE AND OBJECTIVE BOX
***************************************************************  Marco A Melo  (PGY1) Internal Medicine  On TEAMS  ***************************************************************    PROGRESS NOTE:     Patient is a 83y old  Male who presents with a chief complaint of Hyperkalemia, GREG (12 Sep 2023 06:36)      SUBJECTIVE / OVERNIGHT EVENTS:      MEDICATIONS  (STANDING):  allopurinol 100 milliGRAM(s) Oral daily  FIRST- Mouthwash  BLM 10 milliLiter(s) Swish and Swallow four times a day  fluconAZOLE   Tablet 100 milliGRAM(s) Oral every 24 hours  heparin   Injectable 5000 Unit(s) SubCutaneous every 12 hours  lactated ringers. 1000 milliLiter(s) (50 mL/Hr) IV Continuous <Continuous>  midodrine 5 milliGRAM(s) Oral every 8 hours  mirtazapine 15 milliGRAM(s) Oral at bedtime  sodium chloride 0.9%. 1000 milliLiter(s) (50 mL/Hr) IV Continuous <Continuous>    MEDICATIONS  (PRN):  acetaminophen     Tablet .. 650 milliGRAM(s) Oral every 6 hours PRN Moderate Pain (4 - 6)  polyethylene glycol 3350 17 Gram(s) Oral daily PRN Constipation  senna 2 Tablet(s) Oral at bedtime PRN Constipation      CAPILLARY BLOOD GLUCOSE        I&O's Summary    12 Sep 2023 07:01  -  13 Sep 2023 06:35  --------------------------------------------------------  IN: 600 mL / OUT: 0 mL / NET: 600 mL        PHYSICAL EXAM:  Vital Signs Last 24 Hrs  T(C): 36.4 (13 Sep 2023 05:22), Max: 36.9 (12 Sep 2023 13:55)  T(F): 97.6 (13 Sep 2023 05:22), Max: 98.4 (12 Sep 2023 13:55)  HR: 58 (13 Sep 2023 05:22) (56 - 58)  BP: 108/76 (13 Sep 2023 05:22) (108/76 - 123/79)  BP(mean): --  RR: 18 (13 Sep 2023 05:22) (18 - 18)  SpO2: 98% (13 Sep 2023 05:22) (98% - 98%)    Parameters below as of 13 Sep 2023 05:22  Patient On (Oxygen Delivery Method): room air    General : NAD, Cachectic   CV: RRR no R/M/G  Lungs: CTAB  Abd : Soft, non-tender, tenderness RUQ, visible superficial veins  Extremities : 1+ B/L edema   Neuro : A&Ox3    LABS:    Medications (Standing)  MEDICATIONS  (STANDING):  allopurinol 100 milliGRAM(s) Oral daily  FIRST- Mouthwash  BLM 10 milliLiter(s) Swish and Swallow four times a day  fluconAZOLE   Tablet 100 milliGRAM(s) Oral every 24 hours  heparin   Injectable 5000 Unit(s) SubCutaneous every 12 hours  lactated ringers. 1000 milliLiter(s) (50 mL/Hr) IV Continuous <Continuous>  midodrine 5 milliGRAM(s) Oral every 8 hours  mirtazapine 15 milliGRAM(s) Oral at bedtime  sodium chloride 0.9%. 1000 milliLiter(s) (50 mL/Hr) IV Continuous <Continuous>        COORDINATION OF CARE:  Care Discussed with Consultants/Other Providers [Y/N]:  Prior or Outpatient Records Reviewed [Y/N]:   ***************************************************************  Marco A Melo  (PGY1) Internal Medicine  On TEAMS  ***************************************************************    PROGRESS NOTE:     Patient is a 83y old  Male who presents with a chief complaint of Hyperkalemia, GREG (12 Sep 2023 06:36)      SUBJECTIVE / OVERNIGHT EVENTS: No overnight events. Feels like his appetite, is improving. Urine and BM appropriate, most recent was yesterday. Patient denies fever, chills, nausea, vomitting, chest pain, shortness of breath, abdominal pain, diarrhea or constipation.     MEDICATIONS  (STANDING):  allopurinol 100 milliGRAM(s) Oral daily  FIRST- Mouthwash  BLM 10 milliLiter(s) Swish and Swallow four times a day  fluconAZOLE   Tablet 100 milliGRAM(s) Oral every 24 hours  heparin   Injectable 5000 Unit(s) SubCutaneous every 12 hours  lactated ringers. 1000 milliLiter(s) (50 mL/Hr) IV Continuous <Continuous>  midodrine 5 milliGRAM(s) Oral every 8 hours  mirtazapine 15 milliGRAM(s) Oral at bedtime  sodium chloride 0.9%. 1000 milliLiter(s) (50 mL/Hr) IV Continuous <Continuous>    MEDICATIONS  (PRN):  acetaminophen     Tablet .. 650 milliGRAM(s) Oral every 6 hours PRN Moderate Pain (4 - 6)  polyethylene glycol 3350 17 Gram(s) Oral daily PRN Constipation  senna 2 Tablet(s) Oral at bedtime PRN Constipation      CAPILLARY BLOOD GLUCOSE        I&O's Summary    12 Sep 2023 07:01  -  13 Sep 2023 06:35  --------------------------------------------------------  IN: 600 mL / OUT: 0 mL / NET: 600 mL        PHYSICAL EXAM:  Vital Signs Last 24 Hrs  T(C): 36.4 (13 Sep 2023 05:22), Max: 36.9 (12 Sep 2023 13:55)  T(F): 97.6 (13 Sep 2023 05:22), Max: 98.4 (12 Sep 2023 13:55)  HR: 58 (13 Sep 2023 05:22) (56 - 58)  BP: 108/76 (13 Sep 2023 05:22) (108/76 - 123/79)  BP(mean): --  RR: 18 (13 Sep 2023 05:22) (18 - 18)  SpO2: 98% (13 Sep 2023 05:22) (98% - 98%)    Parameters below as of 13 Sep 2023 05:22  Patient On (Oxygen Delivery Method): room air    General : NAD, Cachectic   CV: RRR no R/M/G  Lungs: CTAB  Abd : Soft, non-tender, tenderness RUQ, visible superficial veins  Extremities : 1+ B/L edema   Neuro : A&Ox3    LABS:    Medications (Standing)  MEDICATIONS  (STANDING):  allopurinol 100 milliGRAM(s) Oral daily  FIRST- Mouthwash  BLM 10 milliLiter(s) Swish and Swallow four times a day  fluconAZOLE   Tablet 100 milliGRAM(s) Oral every 24 hours  heparin   Injectable 5000 Unit(s) SubCutaneous every 12 hours  lactated ringers. 1000 milliLiter(s) (50 mL/Hr) IV Continuous <Continuous>  midodrine 5 milliGRAM(s) Oral every 8 hours  mirtazapine 15 milliGRAM(s) Oral at bedtime  sodium chloride 0.9%. 1000 milliLiter(s) (50 mL/Hr) IV Continuous <Continuous>        COORDINATION OF CARE:  Care Discussed with Consultants/Other Providers [Y/N]:  Prior or Outpatient Records Reviewed [Y/N]:

## 2023-09-14 ENCOUNTER — APPOINTMENT (OUTPATIENT)
Dept: MULTI SPECIALTY CLINIC | Facility: CLINIC | Age: 83
End: 2023-09-14

## 2023-09-14 VITALS
OXYGEN SATURATION: 98 % | RESPIRATION RATE: 18 BRPM | SYSTOLIC BLOOD PRESSURE: 121 MMHG | HEART RATE: 58 BPM | DIASTOLIC BLOOD PRESSURE: 71 MMHG | TEMPERATURE: 98 F

## 2023-09-14 RX ADMIN — HEPARIN SODIUM 5000 UNIT(S): 5000 INJECTION INTRAVENOUS; SUBCUTANEOUS at 06:02

## 2023-09-14 RX ADMIN — MIDODRINE HYDROCHLORIDE 5 MILLIGRAM(S): 2.5 TABLET ORAL at 05:53

## 2023-09-14 RX ADMIN — DIPHENHYDRAMINE HYDROCHLORIDE AND LIDOCAINE HYDROCHLORIDE AND ALUMINUM HYDROXIDE AND MAGNESIUM HYDRO 10 MILLILITER(S): KIT at 06:02

## 2023-11-22 NOTE — ED ADULT NURSE NOTE - MODE OF DISCHARGE
The patient's goals for the shift include      The clinical goals for the shift include pain will be controlled throughout shift.    Problem: Fall/Injury  Goal: Not fall by end of shift  Outcome: Progressing  Goal: Be free from injury by end of the shift  Outcome: Progressing  Goal: Verbalize understanding of personal risk factors for fall in the hospital  Outcome: Progressing     Problem: Pain  Goal: My pain/discomfort is manageable  Outcome: Progressing        Ambulatory

## 2024-03-11 ENCOUNTER — OFFICE (OUTPATIENT)
Dept: URBAN - METROPOLITAN AREA CLINIC 102 | Facility: CLINIC | Age: 84
Setting detail: OPHTHALMOLOGY
End: 2024-03-11
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
## 2024-03-11 PROCEDURE — NO SHOW FE NO SHOW FEE: Performed by: OPHTHALMOLOGY

## (undated) DEVICE — UNDERPAD LINEN SAVER 17 X 24"

## (undated) DEVICE — DRAPE 3/4 SHEET 52X76"

## (undated) DEVICE — NDL HYPO SAFE 22G X 1" (BLACK)

## (undated) DEVICE — PACK IV START WITH CHG

## (undated) DEVICE — BASIN EMESIS 10IN GRADUATED MAUVE

## (undated) DEVICE — ELCTR BOVIE TIP BLADE INSULATED 2.75" EDGE

## (undated) DEVICE — ELCTR ECG CONDUCTIVE ADHESIVE

## (undated) DEVICE — VENODYNE/SCD SLEEVE CALF LARGE

## (undated) DEVICE — VENODYNE/SCD SLEEVE CALF MEDIUM

## (undated) DEVICE — SOL IRR POUR H2O 1000ML

## (undated) DEVICE — DRAIN PENROSE 5/8" X 18" LATEX

## (undated) DEVICE — DVC AUTO CAP RX LOKG

## (undated) DEVICE — DRSG CURITY GAUZE SPONGE 4 X 4" 12-PLY

## (undated) DEVICE — DRSG MASTISOL

## (undated) DEVICE — NDL HYPO SAFE 25G X 1.5" (ORANGE)

## (undated) DEVICE — GOWN TRIMAX LG

## (undated) DEVICE — DRAPE TOWEL BLUE 17" X 24"

## (undated) DEVICE — DRSG DERMABOND MINI

## (undated) DEVICE — SUT POLYSORB 3-0 30" V-20 UNDYED

## (undated) DEVICE — GOWN LG

## (undated) DEVICE — ELCTR BOVIE PENCIL SMOKE EVACUATION

## (undated) DEVICE — PLV-SCD MACHINE: Type: DURABLE MEDICAL EQUIPMENT

## (undated) DEVICE — SOL IRR POUR NS 0.9% 500ML

## (undated) DEVICE — CATH IV SAFE BC 22G X 1" (BLUE)

## (undated) DEVICE — SALIVA EJECTOR (BLUE)

## (undated) DEVICE — PACK MINOR

## (undated) DEVICE — SUT POLYSORB 2-0 60" REEL

## (undated) DEVICE — PLV/PSP-ESU FORCE2 FIL 16736T: Type: DURABLE MEDICAL EQUIPMENT

## (undated) DEVICE — GLV 8 PROTEXIS (WHITE)

## (undated) DEVICE — BLADE SCALPEL SAFETYLOCK #15

## (undated) DEVICE — DRSG 2X2

## (undated) DEVICE — SYR LUER LOK 3CC

## (undated) DEVICE — SUT SURGIPRO 0 30" GS-22

## (undated) DEVICE — SYR LUER LOK 10CC

## (undated) DEVICE — WARMING BLANKET UPPER ADULT

## (undated) DEVICE — DRSG CURITY GAUZE SPONGE 4 X 4" 12-PLY NON-STERILE

## (undated) DEVICE — DRSG STERISTRIPS 0.5 X 4"

## (undated) DEVICE — SOL INJ NS 0.9% 500ML 1-PORT

## (undated) DEVICE — Device

## (undated) DEVICE — SOL IRR POUR NS 0.9% 1000ML

## (undated) DEVICE — BITE BLOCK ADULT 20 X 27MM (GREEN)

## (undated) DEVICE — DENTURE CUP PINK

## (undated) DEVICE — DRSG BANDAID 0.75X3"

## (undated) DEVICE — SUT POLYSORB 2-0 30" GS-22 UNDYED

## (undated) DEVICE — SUT MONOCRYL 4-0 27" PS-2 UNDYED

## (undated) DEVICE — OLYMPUS DISTAL COVER ENDOSCOPE

## (undated) DEVICE — OMNIPAQUE 300  30ML

## (undated) DEVICE — TUBING SUCTION NONCONDUCTIVE 6MM X 12FT

## (undated) DEVICE — INJ SYS RAP REFIL

## (undated) DEVICE — LINE BREATHE SAMPLNG